# Patient Record
Sex: MALE | Race: BLACK OR AFRICAN AMERICAN | URBAN - METROPOLITAN AREA
[De-identification: names, ages, dates, MRNs, and addresses within clinical notes are randomized per-mention and may not be internally consistent; named-entity substitution may affect disease eponyms.]

---

## 2023-11-20 ENCOUNTER — TRANSCRIBE ORDERS (OUTPATIENT)
Facility: HOSPITAL | Age: 73
End: 2023-11-20

## 2023-11-20 DIAGNOSIS — I70.0 ATHEROSCLEROSIS OF AORTA (HCC): Primary | ICD-10-CM

## 2023-11-21 ENCOUNTER — TRANSCRIBE ORDERS (OUTPATIENT)
Facility: HOSPITAL | Age: 73
End: 2023-11-21

## 2023-11-21 DIAGNOSIS — Z87.891 ENCOUNTER FOR SCREENING FOR ABDOMINAL AORTIC ANEURYSM (AAA) IN PATIENT 50 YEARS OF AGE OR OLDER WITH HISTORY OF SMOKING: Primary | ICD-10-CM

## 2023-11-21 DIAGNOSIS — Z13.6 ENCOUNTER FOR SCREENING FOR ABDOMINAL AORTIC ANEURYSM (AAA) IN PATIENT 50 YEARS OF AGE OR OLDER WITH HISTORY OF SMOKING: Primary | ICD-10-CM

## 2023-12-13 ENCOUNTER — TRANSCRIBE ORDERS (OUTPATIENT)
Facility: HOSPITAL | Age: 73
End: 2023-12-13

## 2023-12-13 DIAGNOSIS — F17.211 CIGARETTE NICOTINE DEPENDENCE IN REMISSION: ICD-10-CM

## 2023-12-13 DIAGNOSIS — Z13.6 ENCOUNTER FOR SPECIAL SCREENING EXAMINATION FOR CARDIOVASCULAR DISORDER: Primary | ICD-10-CM

## 2024-01-11 ENCOUNTER — APPOINTMENT (OUTPATIENT)
Facility: HOSPITAL | Age: 74
DRG: 024 | End: 2024-01-11
Payer: MEDICARE

## 2024-01-11 ENCOUNTER — HOSPITAL ENCOUNTER (INPATIENT)
Facility: HOSPITAL | Age: 74
LOS: 12 days | Discharge: NURSING, REHAB OR LONG-TERM CARE | DRG: 024 | End: 2024-01-23
Attending: EMERGENCY MEDICINE | Admitting: ANESTHESIOLOGY
Payer: MEDICARE

## 2024-01-11 ENCOUNTER — ANESTHESIA EVENT (OUTPATIENT)
Facility: HOSPITAL | Age: 74
End: 2024-01-11
Payer: MEDICARE

## 2024-01-11 ENCOUNTER — ANESTHESIA (OUTPATIENT)
Facility: HOSPITAL | Age: 74
End: 2024-01-11
Payer: MEDICARE

## 2024-01-11 ENCOUNTER — HOSPITAL ENCOUNTER (EMERGENCY)
Facility: HOSPITAL | Age: 74
Discharge: HOME | DRG: 024 | End: 2024-01-14
Payer: MEDICARE

## 2024-01-11 VITALS
OXYGEN SATURATION: 100 % | DIASTOLIC BLOOD PRESSURE: 114 MMHG | HEART RATE: 80 BPM | SYSTOLIC BLOOD PRESSURE: 150 MMHG | RESPIRATION RATE: 18 BRPM

## 2024-01-11 DIAGNOSIS — I63.9 ACUTE CEREBROVASCULAR ACCIDENT (CVA) (HCC): Primary | ICD-10-CM

## 2024-01-11 DIAGNOSIS — I63.9 CEREBROVASCULAR ACCIDENT (CVA), UNSPECIFIED MECHANISM (HCC): ICD-10-CM

## 2024-01-11 LAB
ALBUMIN SERPL-MCNC: 3 G/DL (ref 3.5–5)
ALBUMIN/GLOB SERPL: 0.7 (ref 1.1–2.2)
ALP SERPL-CCNC: 56 U/L (ref 45–117)
ALT SERPL-CCNC: 33 U/L (ref 12–78)
ANION GAP SERPL CALC-SCNC: 6 MMOL/L (ref 5–15)
AST SERPL-CCNC: 20 U/L (ref 15–37)
BASOPHILS # BLD: 0 K/UL (ref 0–0.1)
BASOPHILS NFR BLD: 0 % (ref 0–1)
BILIRUB SERPL-MCNC: 0.6 MG/DL (ref 0.2–1)
BUN SERPL-MCNC: 18 MG/DL (ref 6–20)
BUN/CREAT SERPL: 11 (ref 12–20)
CALCIUM SERPL-MCNC: 8.8 MG/DL (ref 8.5–10.1)
CHLORIDE SERPL-SCNC: 109 MMOL/L (ref 97–108)
CO2 SERPL-SCNC: 23 MMOL/L (ref 21–32)
COMMENT:: NORMAL
CREAT SERPL-MCNC: 1.62 MG/DL (ref 0.7–1.3)
DIFFERENTIAL METHOD BLD: NORMAL
EOSINOPHIL # BLD: 0.1 K/UL (ref 0–0.4)
EOSINOPHIL NFR BLD: 2 % (ref 0–7)
ERYTHROCYTE [DISTWIDTH] IN BLOOD BY AUTOMATED COUNT: 12.3 % (ref 11.5–14.5)
GLOBULIN SER CALC-MCNC: 4.2 G/DL (ref 2–4)
GLUCOSE BLD STRIP.AUTO-MCNC: 97 MG/DL (ref 65–117)
GLUCOSE SERPL-MCNC: 99 MG/DL (ref 65–100)
HCT VFR BLD AUTO: 44.6 % (ref 36.6–50.3)
HGB BLD-MCNC: 14.5 G/DL (ref 12.1–17)
IMM GRANULOCYTES # BLD AUTO: 0 K/UL (ref 0–0.04)
IMM GRANULOCYTES NFR BLD AUTO: 0 % (ref 0–0.5)
INR PPP: 1 (ref 0.9–1.1)
LYMPHOCYTES # BLD: 2 K/UL (ref 0.8–3.5)
LYMPHOCYTES NFR BLD: 35 % (ref 12–49)
MCH RBC QN AUTO: 30.1 PG (ref 26–34)
MCHC RBC AUTO-ENTMCNC: 32.5 G/DL (ref 30–36.5)
MCV RBC AUTO: 92.5 FL (ref 80–99)
MONOCYTES # BLD: 0.6 K/UL (ref 0–1)
MONOCYTES NFR BLD: 11 % (ref 5–13)
NEUTS SEG # BLD: 2.9 K/UL (ref 1.8–8)
NEUTS SEG NFR BLD: 52 % (ref 32–75)
NRBC # BLD: 0 K/UL (ref 0–0.01)
NRBC BLD-RTO: 0 PER 100 WBC
PLATELET # BLD AUTO: 189 K/UL (ref 150–400)
PMV BLD AUTO: 10.7 FL (ref 8.9–12.9)
POTASSIUM SERPL-SCNC: 4 MMOL/L (ref 3.5–5.1)
PROT SERPL-MCNC: 7.2 G/DL (ref 6.4–8.2)
PROTHROMBIN TIME: 10.5 SEC (ref 9–11.1)
RBC # BLD AUTO: 4.82 M/UL (ref 4.1–5.7)
SERVICE CMNT-IMP: NORMAL
SODIUM SERPL-SCNC: 138 MMOL/L (ref 136–145)
SPECIMEN HOLD: NORMAL
TROPONIN I SERPL HS-MCNC: 6 NG/L (ref 0–76)
WBC # BLD AUTO: 5.8 K/UL (ref 4.1–11.1)

## 2024-01-11 PROCEDURE — 82962 GLUCOSE BLOOD TEST: CPT

## 2024-01-11 PROCEDURE — C1769 GUIDE WIRE: HCPCS

## 2024-01-11 PROCEDURE — 37195 THROMBOLYTIC THERAPY STROKE: CPT

## 2024-01-11 PROCEDURE — 99285 EMERGENCY DEPT VISIT HI MDM: CPT

## 2024-01-11 PROCEDURE — 4A03X5D MEASUREMENT OF ARTERIAL FLOW, INTRACRANIAL, EXTERNAL APPROACH: ICD-10-PCS | Performed by: EMERGENCY MEDICINE

## 2024-01-11 PROCEDURE — 0042T CT BRAIN PERFUSION: CPT

## 2024-01-11 PROCEDURE — 2580000003 HC RX 258: Performed by: NURSE PRACTITIONER

## 2024-01-11 PROCEDURE — 3E03317 INTRODUCTION OF OTHER THROMBOLYTIC INTO PERIPHERAL VEIN, PERCUTANEOUS APPROACH: ICD-10-PCS | Performed by: EMERGENCY MEDICINE

## 2024-01-11 PROCEDURE — 70498 CT ANGIOGRAPHY NECK: CPT

## 2024-01-11 PROCEDURE — 85610 PROTHROMBIN TIME: CPT

## 2024-01-11 PROCEDURE — 2580000003 HC RX 258

## 2024-01-11 PROCEDURE — 61645 PERQ ART M-THROMBECT &/NFS: CPT | Performed by: RADIOLOGY

## 2024-01-11 PROCEDURE — 36415 COLL VENOUS BLD VENIPUNCTURE: CPT

## 2024-01-11 PROCEDURE — 2580000003 HC RX 258: Performed by: EMERGENCY MEDICINE

## 2024-01-11 PROCEDURE — 85025 COMPLETE CBC W/AUTO DIFF WBC: CPT

## 2024-01-11 PROCEDURE — 96365 THER/PROPH/DIAG IV INF INIT: CPT

## 2024-01-11 PROCEDURE — 84484 ASSAY OF TROPONIN QUANT: CPT

## 2024-01-11 PROCEDURE — 70450 CT HEAD/BRAIN W/O DYE: CPT

## 2024-01-11 PROCEDURE — 6360000004 HC RX CONTRAST MEDICATION: Performed by: EMERGENCY MEDICINE

## 2024-01-11 PROCEDURE — 6360000002 HC RX W HCPCS

## 2024-01-11 PROCEDURE — 03CG3ZZ EXTIRPATION OF MATTER FROM INTRACRANIAL ARTERY, PERCUTANEOUS APPROACH: ICD-10-PCS | Performed by: RADIOLOGY

## 2024-01-11 PROCEDURE — 3700000000 HC ANESTHESIA ATTENDED CARE

## 2024-01-11 PROCEDURE — 2500000003 HC RX 250 WO HCPCS

## 2024-01-11 PROCEDURE — 3700000001 HC ADD 15 MINUTES (ANESTHESIA)

## 2024-01-11 PROCEDURE — 80053 COMPREHEN METABOLIC PANEL: CPT

## 2024-01-11 PROCEDURE — 6360000002 HC RX W HCPCS: Performed by: EMERGENCY MEDICINE

## 2024-01-11 PROCEDURE — APPNB45 APP NON BILLABLE 31-45 MINUTES

## 2024-01-11 PROCEDURE — 2000000000 HC ICU R&B

## 2024-01-11 RX ORDER — LABETALOL HYDROCHLORIDE 5 MG/ML
10 INJECTION, SOLUTION INTRAVENOUS ONCE
Status: DISCONTINUED | OUTPATIENT
Start: 2024-01-11 | End: 2024-01-13

## 2024-01-11 RX ORDER — SENNOSIDES 8.8 MG/5ML
5 LIQUID ORAL 2 TIMES DAILY PRN
Status: DISCONTINUED | OUTPATIENT
Start: 2024-01-11 | End: 2024-01-23 | Stop reason: HOSPADM

## 2024-01-11 RX ORDER — SODIUM CHLORIDE, SODIUM LACTATE, POTASSIUM CHLORIDE, CALCIUM CHLORIDE 600; 310; 30; 20 MG/100ML; MG/100ML; MG/100ML; MG/100ML
INJECTION, SOLUTION INTRAVENOUS CONTINUOUS
Status: DISCONTINUED | OUTPATIENT
Start: 2024-01-11 | End: 2024-01-12

## 2024-01-11 RX ORDER — SODIUM CHLORIDE 0.9 % (FLUSH) 0.9 %
5-40 SYRINGE (ML) INJECTION EVERY 12 HOURS SCHEDULED
Status: DISCONTINUED | OUTPATIENT
Start: 2024-01-11 | End: 2024-01-14

## 2024-01-11 RX ORDER — ONDANSETRON 2 MG/ML
4 INJECTION INTRAMUSCULAR; INTRAVENOUS EVERY 6 HOURS PRN
Status: DISCONTINUED | OUTPATIENT
Start: 2024-01-11 | End: 2024-01-23 | Stop reason: HOSPADM

## 2024-01-11 RX ORDER — DEXAMETHASONE SODIUM PHOSPHATE 4 MG/ML
INJECTION, SOLUTION INTRA-ARTICULAR; INTRALESIONAL; INTRAMUSCULAR; INTRAVENOUS; SOFT TISSUE PRN
Status: DISCONTINUED | OUTPATIENT
Start: 2024-01-11 | End: 2024-01-11 | Stop reason: SDUPTHER

## 2024-01-11 RX ORDER — ACETAMINOPHEN 650 MG/1
650 SUPPOSITORY RECTAL EVERY 6 HOURS PRN
Status: DISCONTINUED | OUTPATIENT
Start: 2024-01-11 | End: 2024-01-23 | Stop reason: HOSPADM

## 2024-01-11 RX ORDER — SODIUM CHLORIDE 0.9 % (FLUSH) 0.9 %
5-40 SYRINGE (ML) INJECTION PRN
Status: DISCONTINUED | OUTPATIENT
Start: 2024-01-11 | End: 2024-01-23 | Stop reason: HOSPADM

## 2024-01-11 RX ORDER — SUCCINYLCHOLINE CHLORIDE 20 MG/ML
INJECTION INTRAMUSCULAR; INTRAVENOUS PRN
Status: DISCONTINUED | OUTPATIENT
Start: 2024-01-11 | End: 2024-01-11 | Stop reason: SDUPTHER

## 2024-01-11 RX ORDER — ROCURONIUM BROMIDE 10 MG/ML
INJECTION, SOLUTION INTRAVENOUS PRN
Status: DISCONTINUED | OUTPATIENT
Start: 2024-01-11 | End: 2024-01-11 | Stop reason: SDUPTHER

## 2024-01-11 RX ORDER — ONDANSETRON 4 MG/1
4 TABLET, ORALLY DISINTEGRATING ORAL EVERY 8 HOURS PRN
Status: DISCONTINUED | OUTPATIENT
Start: 2024-01-11 | End: 2024-01-23 | Stop reason: HOSPADM

## 2024-01-11 RX ORDER — FENTANYL CITRATE 50 UG/ML
INJECTION, SOLUTION INTRAMUSCULAR; INTRAVENOUS PRN
Status: DISCONTINUED | OUTPATIENT
Start: 2024-01-11 | End: 2024-01-11 | Stop reason: SDUPTHER

## 2024-01-11 RX ORDER — ALBUTEROL SULFATE 2.5 MG/3ML
2.5 SOLUTION RESPIRATORY (INHALATION) EVERY 6 HOURS PRN
Status: DISCONTINUED | OUTPATIENT
Start: 2024-01-11 | End: 2024-01-23 | Stop reason: HOSPADM

## 2024-01-11 RX ORDER — SODIUM CHLORIDE 0.9 % (FLUSH) 0.9 %
10 SYRINGE (ML) INJECTION ONCE
Status: DISCONTINUED | OUTPATIENT
Start: 2024-01-11 | End: 2024-01-14

## 2024-01-11 RX ORDER — ONDANSETRON 2 MG/ML
INJECTION INTRAMUSCULAR; INTRAVENOUS PRN
Status: DISCONTINUED | OUTPATIENT
Start: 2024-01-11 | End: 2024-01-11 | Stop reason: SDUPTHER

## 2024-01-11 RX ORDER — SODIUM CHLORIDE 9 MG/ML
INJECTION, SOLUTION INTRAVENOUS CONTINUOUS PRN
Status: DISCONTINUED | OUTPATIENT
Start: 2024-01-11 | End: 2024-01-11 | Stop reason: SDUPTHER

## 2024-01-11 RX ORDER — SODIUM CHLORIDE 9 MG/ML
INJECTION, SOLUTION INTRAVENOUS PRN
Status: DISCONTINUED | OUTPATIENT
Start: 2024-01-11 | End: 2024-01-23 | Stop reason: HOSPADM

## 2024-01-11 RX ORDER — SODIUM CHLORIDE, SODIUM LACTATE, POTASSIUM CHLORIDE, CALCIUM CHLORIDE 600; 310; 30; 20 MG/100ML; MG/100ML; MG/100ML; MG/100ML
INJECTION, SOLUTION INTRAVENOUS CONTINUOUS
Status: DISCONTINUED | OUTPATIENT
Start: 2024-01-11 | End: 2024-01-11

## 2024-01-11 RX ORDER — ACETAMINOPHEN 325 MG/1
650 TABLET ORAL EVERY 6 HOURS PRN
Status: DISCONTINUED | OUTPATIENT
Start: 2024-01-11 | End: 2024-01-23 | Stop reason: HOSPADM

## 2024-01-11 RX ORDER — PHENYLEPHRINE HCL IN 0.9% NACL 0.4MG/10ML
SYRINGE (ML) INTRAVENOUS PRN
Status: DISCONTINUED | OUTPATIENT
Start: 2024-01-11 | End: 2024-01-11 | Stop reason: SDUPTHER

## 2024-01-11 RX ORDER — MAGNESIUM HYDROXIDE/ALUMINUM HYDROXICE/SIMETHICONE 120; 1200; 1200 MG/30ML; MG/30ML; MG/30ML
30 SUSPENSION ORAL EVERY 6 HOURS PRN
Status: DISCONTINUED | OUTPATIENT
Start: 2024-01-11 | End: 2024-01-11

## 2024-01-11 RX ORDER — ACETAMINOPHEN 325 MG/1
650 TABLET ORAL EVERY 4 HOURS PRN
Status: DISCONTINUED | OUTPATIENT
Start: 2024-01-11 | End: 2024-01-11 | Stop reason: DRUGHIGH

## 2024-01-11 RX ADMIN — SODIUM CHLORIDE, POTASSIUM CHLORIDE, SODIUM LACTATE AND CALCIUM CHLORIDE: 600; 310; 30; 20 INJECTION, SOLUTION INTRAVENOUS at 22:25

## 2024-01-11 RX ADMIN — PROPOFOL 150 MG: 10 INJECTION, EMULSION INTRAVENOUS at 20:34

## 2024-01-11 RX ADMIN — SODIUM CHLORIDE: 9 INJECTION, SOLUTION INTRAVENOUS at 20:28

## 2024-01-11 RX ADMIN — Medication 20 MG: at 19:30

## 2024-01-11 RX ADMIN — DEXAMETHASONE SODIUM PHOSPHATE 4 MG: 4 INJECTION INTRA-ARTICULAR; INTRALESIONAL; INTRAMUSCULAR; INTRAVENOUS; SOFT TISSUE at 20:56

## 2024-01-11 RX ADMIN — FENTANYL CITRATE 50 MCG: 50 INJECTION, SOLUTION INTRAMUSCULAR; INTRAVENOUS at 20:34

## 2024-01-11 RX ADMIN — IOPAMIDOL 80 ML: 755 INJECTION, SOLUTION INTRAVENOUS at 19:47

## 2024-01-11 RX ADMIN — LIDOCAINE HYDROCHLORIDE 100 MG: 20 INJECTION, SOLUTION EPIDURAL; INFILTRATION; INTRACAUDAL; PERINEURAL at 20:33

## 2024-01-11 RX ADMIN — SODIUM CHLORIDE, PRESERVATIVE FREE 10 ML: 5 INJECTION INTRAVENOUS at 22:29

## 2024-01-11 RX ADMIN — LEVETIRACETAM 4854 MG: 100 INJECTION, SOLUTION INTRAVENOUS at 20:15

## 2024-01-11 RX ADMIN — ONDANSETRON 4 MG: 2 INJECTION INTRAMUSCULAR; INTRAVENOUS at 20:56

## 2024-01-11 RX ADMIN — IOPAMIDOL 40 ML: 755 INJECTION, SOLUTION INTRAVENOUS at 19:46

## 2024-01-11 RX ADMIN — SUCCINYLCHOLINE CHLORIDE 180 MG: 20 INJECTION, SOLUTION INTRAMUSCULAR; INTRAVENOUS at 20:34

## 2024-01-11 RX ADMIN — PHENYLEPHRINE HYDROCHLORIDE 50 MCG/MIN: 10 INJECTION INTRAVENOUS at 20:39

## 2024-01-11 RX ADMIN — PROPOFOL 30 MG: 10 INJECTION, EMULSION INTRAVENOUS at 21:11

## 2024-01-11 RX ADMIN — Medication 160 MCG: at 20:41

## 2024-01-11 RX ADMIN — SUGAMMADEX 200 MG: 100 INJECTION, SOLUTION INTRAVENOUS at 21:11

## 2024-01-11 RX ADMIN — ROCURONIUM BROMIDE 10 MG: 10 SOLUTION INTRAVENOUS at 20:33

## 2024-01-11 RX ADMIN — ROCURONIUM BROMIDE 30 MG: 10 SOLUTION INTRAVENOUS at 20:39

## 2024-01-11 ASSESSMENT — PAIN SCALES - WONG BAKER: WONGBAKER_NUMERICALRESPONSE: 0

## 2024-01-12 ENCOUNTER — APPOINTMENT (OUTPATIENT)
Facility: HOSPITAL | Age: 74
DRG: 024 | End: 2024-01-12
Payer: MEDICARE

## 2024-01-12 PROBLEM — I10 PRIMARY HYPERTENSION: Status: ACTIVE | Noted: 2024-01-12

## 2024-01-12 PROBLEM — I63.232: Status: ACTIVE | Noted: 2024-01-12

## 2024-01-12 PROBLEM — E78.5 HYPERLIPIDEMIA: Status: ACTIVE | Noted: 2024-01-12

## 2024-01-12 LAB
AMPHET UR QL SCN: NEGATIVE
ANION GAP SERPL CALC-SCNC: 8 MMOL/L (ref 5–15)
BARBITURATES UR QL SCN: NEGATIVE
BENZODIAZ UR QL: NEGATIVE
BUN SERPL-MCNC: 18 MG/DL (ref 6–20)
BUN/CREAT SERPL: 13 (ref 12–20)
CALCIUM SERPL-MCNC: 8.7 MG/DL (ref 8.5–10.1)
CANNABINOIDS UR QL SCN: NEGATIVE
CHLORIDE SERPL-SCNC: 111 MMOL/L (ref 97–108)
CHOLEST SERPL-MCNC: 158 MG/DL
CO2 SERPL-SCNC: 24 MMOL/L (ref 21–32)
COCAINE UR QL SCN: NEGATIVE
CREAT SERPL-MCNC: 1.35 MG/DL (ref 0.7–1.3)
ECHO AO ROOT DIAM: 3.5 CM
ECHO AO ROOT INDEX: 1.68 CM/M2
ECHO BSA: 2.09 M2
ECHO LA DIAMETER INDEX: 1.97 CM/M2
ECHO LA DIAMETER: 4.1 CM
ECHO LA TO AORTIC ROOT RATIO: 1.17
ECHO LA VOL A-L A2C: 72 ML (ref 18–58)
ECHO LA VOL A-L A4C: 56 ML (ref 18–58)
ECHO LA VOL MOD A2C: 71 ML (ref 18–58)
ECHO LA VOL MOD A4C: 51 ML (ref 18–58)
ECHO LA VOLUME AREA LENGTH: 66 ML
ECHO LA VOLUME INDEX A-L A2C: 35 ML/M2 (ref 16–34)
ECHO LA VOLUME INDEX A-L A4C: 27 ML/M2 (ref 16–34)
ECHO LA VOLUME INDEX AREA LENGTH: 32 ML/M2 (ref 16–34)
ECHO LA VOLUME INDEX MOD A2C: 34 ML/M2 (ref 16–34)
ECHO LA VOLUME INDEX MOD A4C: 25 ML/M2 (ref 16–34)
ECHO LV E' LATERAL VELOCITY: 6 CM/S
ECHO LV E' SEPTAL VELOCITY: 5 CM/S
ECHO LV FRACTIONAL SHORTENING: 37 % (ref 28–44)
ECHO LV INTERNAL DIMENSION DIASTOLE INDEX: 1.97 CM/M2
ECHO LV INTERNAL DIMENSION DIASTOLIC: 4.1 CM (ref 4.2–5.9)
ECHO LV INTERNAL DIMENSION SYSTOLIC INDEX: 1.25 CM/M2
ECHO LV INTERNAL DIMENSION SYSTOLIC: 2.6 CM
ECHO LV IVSD: 1 CM (ref 0.6–1)
ECHO LV MASS 2D: 141.5 G (ref 88–224)
ECHO LV MASS INDEX 2D: 68.1 G/M2 (ref 49–115)
ECHO LV POSTERIOR WALL DIASTOLIC: 1.1 CM (ref 0.6–1)
ECHO LV RELATIVE WALL THICKNESS RATIO: 0.54
ECHO LVOT AREA: 2.8 CM2
ECHO LVOT DIAM: 1.9 CM
ECHO LVOT PEAK GRADIENT: 4 MMHG
ECHO LVOT PEAK VELOCITY: 1 M/S
ECHO MV A VELOCITY: 0.91 M/S
ECHO MV AREA PHT: 3.5 CM2
ECHO MV E DECELERATION TIME (DT): 216.4 MS
ECHO MV E VELOCITY: 0.68 M/S
ECHO MV E/A RATIO: 0.75
ECHO MV E/E' LATERAL: 11.33
ECHO MV E/E' RATIO (AVERAGED): 12.47
ECHO MV PRESSURE HALF TIME (PHT): 62.7 MS
ECHO PV MAX VELOCITY: 0.8 M/S
ECHO PV PEAK GRADIENT: 3 MMHG
ECHO RV FREE WALL PEAK S': 18 CM/S
ECHO RV TAPSE: 1.7 CM (ref 1.7–?)
EST. AVERAGE GLUCOSE BLD GHB EST-MCNC: 114 MG/DL
GLUCOSE SERPL-MCNC: 126 MG/DL (ref 65–100)
HBA1C MFR BLD: 5.6 % (ref 4–5.6)
HDLC SERPL-MCNC: 38 MG/DL
HDLC SERPL: 4.2 (ref 0–5)
LACTATE SERPL-SCNC: 1.7 MMOL/L (ref 0.4–2)
LDLC SERPL CALC-MCNC: 110.6 MG/DL (ref 0–100)
Lab: NORMAL
MAGNESIUM SERPL-MCNC: 2.1 MG/DL (ref 1.6–2.4)
METHADONE UR QL: NEGATIVE
OPIATES UR QL: NEGATIVE
PCP UR QL: NEGATIVE
PHOSPHATE SERPL-MCNC: 3 MG/DL (ref 2.6–4.7)
POTASSIUM SERPL-SCNC: 4.4 MMOL/L (ref 3.5–5.1)
PROCALCITONIN SERPL-MCNC: <0.05 NG/ML
SODIUM SERPL-SCNC: 143 MMOL/L (ref 136–145)
TRIGL SERPL-MCNC: 47 MG/DL
TSH SERPL DL<=0.05 MIU/L-ACNC: 0.67 UIU/ML (ref 0.36–3.74)
VLDLC SERPL CALC-MCNC: 9.4 MG/DL

## 2024-01-12 PROCEDURE — 84145 PROCALCITONIN (PCT): CPT

## 2024-01-12 PROCEDURE — 2580000003 HC RX 258: Performed by: EMERGENCY MEDICINE

## 2024-01-12 PROCEDURE — 94640 AIRWAY INHALATION TREATMENT: CPT

## 2024-01-12 PROCEDURE — 84100 ASSAY OF PHOSPHORUS: CPT

## 2024-01-12 PROCEDURE — 93005 ELECTROCARDIOGRAM TRACING: CPT | Performed by: NURSE PRACTITIONER

## 2024-01-12 PROCEDURE — 2580000003 HC RX 258

## 2024-01-12 PROCEDURE — 2000000000 HC ICU R&B

## 2024-01-12 PROCEDURE — 93306 TTE W/DOPPLER COMPLETE: CPT

## 2024-01-12 PROCEDURE — 84443 ASSAY THYROID STIM HORMONE: CPT

## 2024-01-12 PROCEDURE — 80061 LIPID PANEL: CPT

## 2024-01-12 PROCEDURE — 83036 HEMOGLOBIN GLYCOSYLATED A1C: CPT

## 2024-01-12 PROCEDURE — 83735 ASSAY OF MAGNESIUM: CPT

## 2024-01-12 PROCEDURE — A4216 STERILE WATER/SALINE, 10 ML: HCPCS | Performed by: NURSE PRACTITIONER

## 2024-01-12 PROCEDURE — 70551 MRI BRAIN STEM W/O DYE: CPT

## 2024-01-12 PROCEDURE — 6370000000 HC RX 637 (ALT 250 FOR IP): Performed by: INTERNAL MEDICINE

## 2024-01-12 PROCEDURE — 87040 BLOOD CULTURE FOR BACTERIA: CPT

## 2024-01-12 PROCEDURE — 99233 SBSQ HOSP IP/OBS HIGH 50: CPT | Performed by: NURSE PRACTITIONER

## 2024-01-12 PROCEDURE — 80307 DRUG TEST PRSMV CHEM ANLYZR: CPT

## 2024-01-12 PROCEDURE — 92610 EVALUATE SWALLOWING FUNCTION: CPT

## 2024-01-12 PROCEDURE — 80048 BASIC METABOLIC PNL TOTAL CA: CPT

## 2024-01-12 PROCEDURE — 51798 US URINE CAPACITY MEASURE: CPT

## 2024-01-12 PROCEDURE — 36415 COLL VENOUS BLD VENIPUNCTURE: CPT

## 2024-01-12 PROCEDURE — 99223 1ST HOSP IP/OBS HIGH 75: CPT | Performed by: PSYCHIATRY & NEUROLOGY

## 2024-01-12 PROCEDURE — 93306 TTE W/DOPPLER COMPLETE: CPT | Performed by: SPECIALIST

## 2024-01-12 PROCEDURE — 83605 ASSAY OF LACTIC ACID: CPT

## 2024-01-12 PROCEDURE — 6360000002 HC RX W HCPCS: Performed by: INTERNAL MEDICINE

## 2024-01-12 PROCEDURE — 2580000003 HC RX 258: Performed by: NURSE PRACTITIONER

## 2024-01-12 PROCEDURE — 2500000003 HC RX 250 WO HCPCS: Performed by: NURSE PRACTITIONER

## 2024-01-12 PROCEDURE — 6370000000 HC RX 637 (ALT 250 FOR IP): Performed by: NURSE PRACTITIONER

## 2024-01-12 PROCEDURE — APPNB60 APP NON BILLABLE TIME 46-60 MINS

## 2024-01-12 RX ORDER — TAMSULOSIN HYDROCHLORIDE 0.4 MG/1
0.4 CAPSULE ORAL
Status: DISCONTINUED | OUTPATIENT
Start: 2024-01-12 | End: 2024-01-23 | Stop reason: HOSPADM

## 2024-01-12 RX ORDER — ALBUTEROL SULFATE 2.5 MG/3ML
2.5 SOLUTION RESPIRATORY (INHALATION) EVERY 6 HOURS PRN
Status: DISCONTINUED | OUTPATIENT
Start: 2024-01-12 | End: 2024-01-23 | Stop reason: HOSPADM

## 2024-01-12 RX ORDER — CLINDAMYCIN PHOSPHATE 600 MG/50ML
600 INJECTION, SOLUTION INTRAVENOUS EVERY 8 HOURS
Status: DISCONTINUED | OUTPATIENT
Start: 2024-01-12 | End: 2024-01-12 | Stop reason: CLARIF

## 2024-01-12 RX ORDER — ATORVASTATIN CALCIUM 40 MG/1
40 TABLET, FILM COATED ORAL NIGHTLY
Status: DISCONTINUED | OUTPATIENT
Start: 2024-01-12 | End: 2024-01-23 | Stop reason: HOSPADM

## 2024-01-12 RX ORDER — FINASTERIDE 5 MG/1
5 TABLET, FILM COATED ORAL DAILY
Status: DISCONTINUED | OUTPATIENT
Start: 2024-01-12 | End: 2024-01-23 | Stop reason: HOSPADM

## 2024-01-12 RX ORDER — PETROLATUM,WHITE/LANOLIN
OINTMENT (GRAM) TOPICAL 4 TIMES DAILY PRN
Status: DISCONTINUED | OUTPATIENT
Start: 2024-01-12 | End: 2024-01-23 | Stop reason: HOSPADM

## 2024-01-12 RX ORDER — ASPIRIN 81 MG/1
81 TABLET, CHEWABLE ORAL DAILY
Status: DISCONTINUED | OUTPATIENT
Start: 2024-01-13 | End: 2024-01-23 | Stop reason: HOSPADM

## 2024-01-12 RX ADMIN — ARFORMOTEROL TARTRATE: 15 SOLUTION RESPIRATORY (INHALATION) at 12:12

## 2024-01-12 RX ADMIN — SODIUM CHLORIDE, POTASSIUM CHLORIDE, SODIUM LACTATE AND CALCIUM CHLORIDE: 600; 310; 30; 20 INJECTION, SOLUTION INTRAVENOUS at 08:42

## 2024-01-12 RX ADMIN — SODIUM CHLORIDE, PRESERVATIVE FREE 10 ML: 5 INJECTION INTRAVENOUS at 20:52

## 2024-01-12 RX ADMIN — ATORVASTATIN CALCIUM 40 MG: 40 TABLET, FILM COATED ORAL at 20:58

## 2024-01-12 RX ADMIN — SODIUM CHLORIDE, PRESERVATIVE FREE 10 ML: 5 INJECTION INTRAVENOUS at 08:43

## 2024-01-12 RX ADMIN — FINASTERIDE 5 MG: 5 TABLET, FILM COATED ORAL at 13:08

## 2024-01-12 RX ADMIN — FAMOTIDINE 20 MG: 10 INJECTION, SOLUTION INTRAVENOUS at 08:42

## 2024-01-12 RX ADMIN — SODIUM CHLORIDE, POTASSIUM CHLORIDE, SODIUM LACTATE AND CALCIUM CHLORIDE: 600; 310; 30; 20 INJECTION, SOLUTION INTRAVENOUS at 20:50

## 2024-01-12 RX ADMIN — SODIUM CHLORIDE, PRESERVATIVE FREE 10 ML: 5 INJECTION INTRAVENOUS at 20:51

## 2024-01-12 ASSESSMENT — PAIN SCALES - WONG BAKER
WONGBAKER_NUMERICALRESPONSE: 0

## 2024-01-12 NOTE — CONSULTS
NeuroInterventional Surgery Consult  HUSSAIN Barraza NP    Patient: Cale Mohan MRN: [id number]  SSN: [id number]    YOB: 1950  Age: 73 y.o.  Sex: male      Chief Complaints: Right arm weakness, inability to speak.    HPI:   73 y.o. R-H Black /   male w/ limited pmh including angina and HTN.  He presented to [facility] ED on 1/11/24 via EMS for right-side weakness and inability to speak.  History provided by live-in parterMagalis, who reports that the pt attempted to get up to the bathroom between 5019-2166 when she noticed that his right arm was shaking and he couldn't speak; LKWT 1800.  Pt arrived as level 1 code stroke, initial NIHSS 19. Initial CTH showed no acute process.  After exam by teleneurology pt was administered TNK. CTP showed evidence of perfusion mismatch in the left occipital lobe w/ concern for possible PCA occlusion.  CTAh-n showed occluded distal ICA w/ fetal-type left PCA.  Pt's partner and decision maker, Magalis, as well as her son were notified and consented to angiogram w/ plan for thrombectomy. Pt's baseline mRS 0.  He was taken for cerebral angiogram w/ mechanical thrombectomy by Dr. Aly. NIS has been consulted for evaluation and care in the setting of acute ischemic stroke.      No past medical history on file.  No family history on file.  Social History     Tobacco Use    Smoking status: Not on file    Smokeless tobacco: Not on file   Substance Use Topics    Alcohol use: Not on file      Current Facility-Administered Medications   Medication Dose Route Frequency Provider Last Rate Last Admin    sodium chloride flush 0.9 % injection 5-40 mL  5-40 mL IntraVENous 2 times per day Chiquita Alatorre MD        sodium chloride flush 0.9 % injection 5-40 mL  5-40 mL IntraVENous PRN Chiquita Alatorre MD        0.9 % sodium chloride infusion   IntraVENous PRN Chiquita Alatorre MD        sodium chloride flush 0.9 % injection 10 mL  10 mL IntraVENous Once Chiquita Alatorre

## 2024-01-12 NOTE — H&P
Name: Cale Mohan   : 1950   MRN: [id number]   Date: 2024      REASON FOR ICU ADMISSION:  CVA post TNK and Mechanical Thrombectomy     PRINCIPLE ICU DIAGNOSIS   Acute CVA: Left ICA occlusion  post TNK and Mechanical Thrombectomy   Age indeterminate occlusion of cervical left vertebral artery, with  reconstitution distally.  Focal stenosis of the basilar artery.  Nonspecific tiny gas pockets in the right face/upper neck/right parapharyngeal soft tissues seen on CTA neck.  Hx HTN    PATIENT SUMMARY   Cale Mohan is a 73 y.o. male with a history of HTN who presented to ED with complains of right sided weakness and aphasia. LKW at 1800. Patient attempted to get up to the bathroom sometime between 8972-9300 and she noticed that the patient's right arm was shaking and that he could not speak . EMS called and Code S called. Initial NIHSS 19. CT done, no hemorrhage and L ICA occlusion noted on CTA head and neck. Also non specific gas pockets in the right upper neck soft tissues noted. Tele neuro evaluated and deemed patient a candidate for TNK. Patient not on any anticoagulation. TNK was given at 1930. NIS was also notified. Patient was taken to angio for emergent mechanical thrombectomy. Intubated for procedure and then extubated and transferred to ICU post procedure for continued management.      COMPREHENSIVE ASSESSMENT & PLAN:SYSTEM BASED     24 HOUR EVENTS: as above    NEUROLOGICAL:   Acute CVA: Left MCA M2 inferior division occlusion, s/p TNK and combined thrombectomy resulting in complete reperfusion of left MCA territory.  Analgesia: None  Sedation: None  Neuro check Frequency: post procedure protocol and then hourly    Loaded with Keppra in ED.   No ASA/anticoagulants until 24 hours post TNK and imaging is negative for hemorrhage  MRI per neurology  A1C and lipid panel pending, LDL goal <70  PT/OT/SLP evals    PULMONOLOGY:   Respiratory Goals: Maintain on RA at this time    CARDIOVASCULAR:    HTN  SBP Goal of: 100-160 mmHg  MAP Goal of: greater than 65 mmHg  None - For above SBP/MAP goals  IVF: LR @ 75 ml/hr for 24 hrs - then re-evaluate need to continue   Echo  EKG    GASTROINTESTINAL   Diet/Feeding: Swallow evaluation and advance as tolerated to regular diet.   SUP   PRN Zofran for N/V    RENAL/ELECTROLYTE/FLUIDS:   Keep K>4; Mg>2    Trend renal indices/ UOP  Trend Chemistry  Send UDS    ENDOCRINE:   Glycemic Control: Goal 120-180: SSI PRN  Prevent hypoglycemia  TSH    HEMATOLOGY/ONCOLOGY:   Transfusion Trigger (Hgb): 7.0  Trend CBC    INFECTIOUS DISEASE:   Empiric abx for possible soft tissue infection seen on CTA neck   Send procalcitonin and lactic acid  ANTIBIOTICS TO DATE:   01/11/24: Start on Clindamycin once blood cx are completed.     CULTURES TO DATE:   01/11/24: Blood cx    ICU DAILY CHECKLIST   Code Status: Full code  DVT Prophylaxis:SCDs  T/L/D: Tubes: None  Lines: PIVs  Drains: None  SUP: Pepcid  Diet: NPO - advance as tolerated to regular  Activity Level: Bedrest then PT/OT  ABCDEF Bundle/Checklist Completed: Yes  Disposition: Stay in ICU   Multidisciplinary Rounds Completed: No  Patient/Family Updated: Yes at bedside  Goals of Care Discussion/Palliative: NA    Feeding Tube:                        Sheath:                          Infusion Wire/Catheter:      Peripherally Inserted Central Catheter:     Central Venous Catheter:     Venous Access Device:     Peripheral Intravenous Line:    Peripheral IV 01/11/24 Distal;Left Cephalic (Active)     Arterial Line:     Hemodialysis Catheter:     Drain(s):     Airway:     Intraosseous Line:     Epidural:     Atrial Line:     Cervical Ripening Balloon:       HOSPITAL COURSE/DAILY EVENT LOG   01/11/24 : Admit to ICU post TNK and thrombectomy.     SUBJECTIVE   ROS:  A comprehensive review of systems was negative except for: Neurological: positive for speech problems and weakness    OBJECTIVE   Labs and Data: Reviewed 01/11/24  Medications: Reviewed

## 2024-01-12 NOTE — CARE COORDINATION
Care Management Initial Assessment       RUR: 8% Low   Readmission? No  1st IM letter given? Yes - 1/12/24 01/12/24 1201   Service Assessment   Patient Orientation Unable to Assess   Cognition Dementia / Early Alzheimer's   History Provided By Significant Other  (Magalis Cotter [phone])   Primary Caregiver Other (Comment)  (Significant other Magalis Mcclellan)   Support Systems Spouse/Significant Other   Patient's Healthcare Decision Maker is: Legal Next of Kin  (Magalis [place])   PCP Verified by CM Yes   Last Visit to PCP Within last 3 months   Prior Functional Level Independent in ADLs/IADLs   Current Functional Level Assistance with the following:;Bathing;Dressing;Toileting;Mobility   Can patient return to prior living arrangement Unknown at present   Ability to make needs known: Poor  (Patient has early dementia)   Family able to assist with home care needs: Yes   Would you like for me to discuss the discharge plan with any other family members/significant others, and if so, who? Yes   Financial Resources Medicare  ([facility] Medicare)   Community Resources None   Social/Functional History   Lives With Significant other  (Magalis Cotter)   Type of Home House   Home Layout One level   Home Access Stairs to enter with rails   Entrance Stairs - Number of Steps 1   Entrance Stairs - Rails None   Bathroom Shower/Tub Tub/Shower unit   Bathroom Equipment None   Home Equipment Rollator   Receives Help From Family   ADL Assistance Independent   Homemaking Assistance Independent   Ambulation Assistance Independent   Transfer Assistance Independent   Active  No   Patient's  Info Magalis Cotter   Mode of Transportation Car   Discharge Planning   Living Arrangements Spouse/Significant Other   Current Services Prior To Admission None   Potential Assistance Purchasing Medications No   One/Two Story Residence One story   History of falls? 0     Patient presented to the ED with aphasia, right arm contracted and a left gaze.

## 2024-01-12 NOTE — PROGRESS NOTES
[facility]     ICU TEAM Progress Note           Name: Cale Mohan   : 1950   MRN: [id number]   Date: 2024          ICU PROBLEM LIST   Acute CVA: Left ICA occlusion  post TNK and Mechanical Thrombectomy   Age indeterminate occlusion of cervical left vertebral artery, with  reconstitution distally.  Focal stenosis of the basilar artery.  Nonspecific tiny gas pockets in the right face/upper neck/right parapharyngeal soft tissues seen on CTA neck.  Hx HTN     HISTORY OF PRESENT ILLNESS:     Cale Mohan is a 73 y.o. male with a history of HTN who presented to ED with complains of right sided weakness and aphasia. LKW at 1800. Patient attempted to get up to the bathroom sometime between 2093-6106 and she noticed that the patient's right arm was shaking and that he could not speak . EMS called and Code S called. Initial NIHSS 19. CT done, no hemorrhage and L ICA occlusion noted on CTA head and neck. Also non specific gas pockets in the right upper neck soft tissues noted. Tele neuro evaluated and deemed patient a candidate for TNK. Patient not on any anticoagulation. TNK was given at 1930. NIS was also notified. Patient was taken to angio for emergent mechanical thrombectomy. Intubated for procedure and then extubated and transferred to ICU post procedure for continued management.      SUBJECTIVE:     No acute events overnight     NEUROLOGICAL:   Acute CVA: Left MCA M2 inferior division occlusion, s/p TNK and combined thrombectomy resulting in complete reperfusion of left MCA territory.  Analgesia: None  Sedation: None  Neuro check Frequency: post procedure protocol and then hourly     DC Keppra  No ASA/anticoagulants until 24 hours post TNK and imaging is negative for hemorrhage  MRI per neurology  A1C and lipid panel pending, LDL goal <70  PT/OT/SLP evals    Acute metabolic encephalopathy  Delirium, reported history of early Alzheimer's dementia  Monitor for symptoms.        PULMONOLOGY:   Respiratory Goals: Maintain on RA at this time     CARDIOVASCULAR:   HTN  SBP Goal of: 100-160 mmHg  MAP Goal of: greater than 65 mmHg  None - For above SBP/MAP goals  IVF: LR @ 75 ml/hr for 24 hrs - then re-evaluate need to continue   Echo  EKG     GASTROINTESTINAL   Diet/Feeding: Swallow evaluation and advance as tolerated to regular diet.   SUP   PRN Zofran for N/V     RENAL/ELECTROLYTE/FLUIDS:   Keep K>4; Mg>2           Trend renal indices/ UOP  Trend Chemistry  Send UDS     ENDOCRINE:   Glycemic Control: Goal 120-180: SSI PRN  Prevent hypoglycemia  TSH     HEMATOLOGY/ONCOLOGY:   Transfusion Trigger (Hgb): 7.0  Trend CBC     INFECTIOUS DISEASE:   Small gas seen on CTA neck, low suspicion for infection  Normal Pro-Kenji, no fever no leukocytosis, suspect related to poor dentition  Consider Panorex, or follow-up subsequent CT scan  --Discontinue antibiotics, if concern for infection consider Unasyn/Zosyn      ICU DAILY CHECKLIST   Code Status: Full code  DVT Prophylaxis:SCDs  T/L/D: Tubes: None  Lines: PIVs  Drains: None  SUP: Pepcid  Diet: NPO - advance as tolerated to regular  Activity Level: Bedrest then PT/OT  ABCDEF Bundle/Checklist Completed: Yes  Disposition: Stay in ICU   Multidisciplinary Rounds Completed: No  Patient/Family Updated: Yes at bedside  Goals of Care Discussion/Palliative: NA    POD:  * No surgery found *    S/P:       Active Problem List:     [unfilled]    Past Medical History:      has no past medical history on file.    Past Surgical History:      has no past surgical history on file.    Home Medications:     Prior to Admission medications    Not on File       Allergies/Social/Family History:     No Known Allergies   Social History     Tobacco Use    Smoking status: Not on file    Smokeless tobacco: Not on file   Substance Use Topics    Alcohol use: Not on file      No family history on file.        Objective:   Vital Signs:  /72   Pulse 71   Temp 97.8 °F (36.6 °C)

## 2024-01-12 NOTE — PROGRESS NOTES
Patient to IR suite emergently for mechanical thrombectomy, accompanied by KARAN Soler and REGIS Ortiz, unable to complete neuro assessment due to emergent status, patient alert and right side flaccid    Date/Time Last Known Well Time: 01/11/24 @ 1800    Date/Time Discovery of Stroke Symptoms: 01/11/24 @ 1830    NIHSS upon arrival: 19 from NP note    Time to IR Suite: 2028    Time of Arterial Puncture: 2039    Start of IA Infusion of tPA or  1st pass of recanalization device in   Target vessel: 2048    Time of Revascularization: 2102    Pretreatment TICI: 0    Post treatment TICI: 3    Procedure Stop Time(When sterile drape is off): 2113    Time of first set of VS, neuro cks, groin, and pulse checks: 2118    Time transfer to ICU: 2140      Time of last VS, neuro, groin, and pulse checks documentation before ICU caring for pt: 2133 2135  TRANSFER - OUT REPORT:    Verbal report given to REGIS Luis on Cale Mohan  being transferred to ICU 11 for routine progression of patient care       Report consisted of patient's Situation, Background, Assessment and   Recommendations(SBAR).     Information from the following report(s) Nurse Handoff Report was reviewed with the receiving nurse.           Lines:   Peripheral IV 01/11/24 Distal;Left Cephalic (Active)        Opportunity for questions and clarification was provided.      Patient transported with:  Monitor, O2 @ 10lpm, and Registered Nurse  CRNA  Belongings with patient to ICU

## 2024-01-12 NOTE — ANESTHESIA PRE PROCEDURE
Department of Anesthesiology  Preprocedure Note       Name:  Cale Mohan   Age:  73 y.o.  :  1950                                          MRN:  [id number]         Date:  2024      Surgeon: * No surgeons listed *    Procedure: * No procedures listed *    Medications prior to admission:   Prior to Admission medications    Not on File       Current medications:    No current facility-administered medications for this encounter.     No current outpatient medications on file.     Facility-Administered Medications Ordered in Other Encounters   Medication Dose Route Frequency Provider Last Rate Last Admin   • sodium chloride flush 0.9 % injection 5-40 mL  5-40 mL IntraVENous 2 times per day Chiquita Alatorre MD       • sodium chloride flush 0.9 % injection 5-40 mL  5-40 mL IntraVENous PRN Chiquita Alatorre MD       • 0.9 % sodium chloride infusion   IntraVENous PRN Chiquita Alatorre MD       • sodium chloride flush 0.9 % injection 10 mL  10 mL IntraVENous Once Chiquita Alatorre MD        Followed by   • sodium chloride flush 0.9 % injection 10 mL  10 mL IntraVENous Once Chiquita Alatorre MD       • dextrose bolus 10% 125 mL  125 mL IntraVENous PRN Chiquita Alatorre MD       • labetalol (NORMODYNE;TRANDATE) injection 10 mg  10 mg IntraVENous Once Chiquita Alatorre MD       • levETIRAcetam (KEPPRA) 4,854 mg in sodium chloride 0.9 % 100 mL IVPB  60 mg/kg IntraVENous Once Chiquita Alatorre  mL/hr at 24 4,854 mg at 24       Allergies:  Not on File    Problem List:  There is no problem list on file for this patient.      Past Medical History:  No past medical history on file.    Past Surgical History:  No past surgical history on file.    Social History:    Social History     Tobacco Use   • Smoking status: Not on file   • Smokeless tobacco: Not on file   Substance Use Topics   • Alcohol use: Not on file                                Counseling given: Not Answered      Vital Signs (Current):

## 2024-01-12 NOTE — PROGRESS NOTES
Neurointerventional Surgery Progress Note  Neurocritical Care NP    Admit Date: 1/11/2024   LOS: 1 day      Daily Progress Note: 1/12/2024    Assessment:   Acute Ischemic CVA due to left ICA occlusion s/p TNK and left mechanical thrombectomy of left MCA M2 with complete reperfusion     Plan:   - MRI of Brain pending to assess extent of ischemia  - follow neuro checks post TNK protocol  - Hgb A1C ok at 5.6  - lipid panel shows .6, goal <70, start Lipitor 40 mg QHS  - ECHO shows EF 55-60%. No interatrial shunting seen.   - SBP goal 100-160 post thrombectomy   - PT/OT/SLP evals  - Neurology consult  - NIS signing off, please call if needed       Activity: Up with assistance   DVT ppx: SCDs  Dispo: TBD    Plan d/w Dr. Aly, Intensivist, RN, and patient's family.       HPI: Cale Mohan is a 73 y.o. R-H Black /  male with a PMH of angina and HTN.  He presented to [facility] ED on 1/11/24 via EMS for right-sided weakness and inability to speak. Initial CTH showed no acute process.  He was given TNK after evaluation with teleneuro. CTP showed evidence of perfusion mismatch in the left PCA territory.  CTA h-n showed a left intracranial ICA occlusion extending into the proximal fetal origin PCA.  Age indeterminate occlusion of cervical left vertebral artery, with  reconstitution distally. Focal stenosis of the basilar artery.  Pt's baseline mRS 0.  He was taken for cerebral angiogram w/ mechanical thrombectomy of the left MCA M2 inferior division by Dr. Aly on 1/11/2024 with complete reperfusion.     Subjective:   Patient is awake and following some commands. Family is at bedside. Denies any headache or vision changes, but patient is confused so ROS difficult to assess. Family reports patient has some mild dementia.    Current Facility-Administered Medications   Medication Dose Route Frequency Provider Last Rate Last Admin    albuterol (PROVENTIL) (2.5 MG/3ML) 0.083% nebulizer solution 2.5 mg  2.5 mg

## 2024-01-12 NOTE — ED NOTES
Patient being transported to IR with cardiac monitor. Neuro NP is accompanying as well as the primary RN

## 2024-01-12 NOTE — BRIEF OP NOTE
Brief Procedure Note      Patient: Cale Mohan MRN: [id number]     YOB: 1950  Age: 73 y.o.  Sex: male      Service: Neurointerventional Surgery    Date of Procedure: 1/11/2024    Pre-Procedure Diagnosis: Stroke    Post-Procedure Diagnosis: SAME    : Carmelo Aly MD    Assistant(s): N/A    Procedure(s):   Diagnostic cerebral angiogram  Transarterial mechanical thrombectomy, CNS: Combined thrombectomy of left MCA M2 inferior division  Placement of arteriotomy closure device    Vessels Studied:   Left CCA  Left ICA    Puncture Site: Right femoral artery    Preliminary Findings:   Left MCA M2 inferior division occlusion, s/p combined thrombectomy resulting in complete reperfusion of left MCA territory.    Plan:   Admit to ICU  -160  Q 1 hr neurochecks      Specimens: None    Implants: None    Drains: None    Anesthesia: GA    Estimated Blood Loss: 15 cc      Apparent Intraoperative Complications: None immediate    Patient Condition: Stable    Disposition: ICU      Signed:   Carmelo Aly MD    [facility] Neurointerventional Surgery  [facility]

## 2024-01-12 NOTE — PLAN OF CARE
Speech LAnguage Pathology EVALUATION    Patient: Cale Mohan (73 y.o. male)  Date: 1/12/2024  Primary Diagnosis: Acute cerebrovascular accident (CVA) ([facility]) [I63.9]       Precautions: aspiration, fall                    ASSESSMENT :    Consult received, chart reviewed, patient admitted for acute CVA- Left MCA M2 inferior division occlusion, s/p TNK and combined thrombectomy resulting in complete reperfusion of left MCA territory. Patient significant other in room and provided additional Hx; reports despite dentition, patient consumed regular solids and thin liquids at home. Also reports recent Dx Alz Dementia and noted difficulty with memory, money/med management and notes decline in communication at home.     Patient repositioned upright for safety with PO intake. Patient participated with PO trials thin liquids via cup, pureed solids, soft solids and regular textured solids. Patient with mild perceived delay in transit and trigger of swallow all trials, no overt s/sx aspiration or penetration observed. Patient with limited mastication regular textured solids, at times essentially swallow whole, however no overt s/sx aspiration or penetration observed. Mild oral residue noted R side however cleared with thin liquid wash.    Based on the objective data described below, the patient presents with mild oral phase, questionable pharyngeal phase dysphagia. Patient appears safe to advance to soft bite solids, thin liquids at this time with potential for further diet advancement. Family/Significant other present and reports noted communication and cognition deficits are baseline and secondary to recent reported Dx Alzheimer's Dementia - PMHx in chart is extremely limited therefore cannot confirm.    Patient will benefit from skilled intervention to address the above impairments.     PLAN :  Recommendations and Planned Interventions:  Diet: Soft and bite sized and thin liquids  No straws  Meds crushed with  puree    Reassess swallow function for possible diet advancement  Evaluate cognition/communication as indicated     Acute SLP Services: Yes, patient will be followed by speech-language pathology 2x/week to address goals. Patient's rehabilitation potential is considered to be Good.    Discharge Recommendations: Yes, recommend SLP treatment at next level of care     SUBJECTIVE:   Patient stated, “I want some water.”    OBJECTIVE:   No past medical history on file.No past surgical history on file.  Prior Level of Function/Home Situation:   Social/Functional History  Lives With: Significant other (Magalis Cotter)  Type of Home: House  Home Layout: One level  Home Access: Stairs to enter with rails  Entrance Stairs - Number of Steps: 1  Entrance Stairs - Rails: None  Bathroom Shower/Tub: Tub/Shower unit  Bathroom Equipment: None  Home Equipment: Rollator  Receives Help From: Family  ADL Assistance: Independent  Homemaking Assistance: Independent  Ambulation Assistance: Independent  Transfer Assistance: Independent  Active : No  Patient's  Info: Magalis Cotter  Mode of Transportation: Car    Cognitive and Communication Status:  Neurologic State: Alert  Orientation Level: Oriented to person  Cognition: Decreased attention/concentration, Decreased command following, Impaired decision making, Memory loss, and Poor safety awareness    Dysphagia:  Oral Assessment:  Oral Motor   Labial: No impairment  Dentition: Poor;Limited  Oral Hygiene: Moist;Clean  Lingual: Right deviation;Incoordinated  Velum: Unable to visualize  Mandible: No impairment  P.O. Trials:  PO Trials  Assessment Method(s): Observation  Vocal Quality: No Impairment  Consistency Presented: Regular;Soft & Bite Sized;Pureed;Thin  How Presented: SLP-fed/Presented;Self-fed/presented  Bolus Acceptance: No impairment  Bolus Formation/Control: Impaired  Type of Impairment: Mastication  Propulsion: Delayed (# of seconds)  Oral Residue: Less than 10% of

## 2024-01-12 NOTE — ED TRIAGE NOTES
Patient found slumped over by wife. Right arm contracted, left gaze, aphasic. Last known well 6pm today.

## 2024-01-12 NOTE — ED NOTES
Primary RN provided bedside report to IR, RN upon arrival to IR suite. Keppra was stopped due to verbal order received by anesthesiologist upon arrival to IR prior to procedure

## 2024-01-12 NOTE — CONSULTS
Neurology Staff Addendum:  I have personally seen and examined the patient. I have personally reviewed the chart and images. Elements of my examination included history of present illness, review of systems, review of past medical and surgical history, review of medications, and physical and neurological examination. I have personally reviewed the findings and impressions with the nurse practitioner and am in agreement with their note with changes below.    Pt is a 72yo RH male admitted 1/11/24 after found slumped over by wife, right arm shaking, left gaze preference, aphasic. LKN 1800. /102. NIHSS score 19. CTH neg. CTA H/N with left intracranial ICA occlusion extending to PCA, BA stenosis. CTP with mismatch in left PCA territory. S/p IV TNK at 1930 and left MCA thrombectomy.  TTE with EF 55-60%, LA normal, bubble study neg. EKG with NSR, prolonged QT. HgbA1C 5.6, .6.  Pt has been started on Lipitor 40mg daily. Pt is a very limited historian. Reports that he is feeling okay and denies any weakness. +HTN, No HLD, DM, Tob, CARLOS, ho CVA. Not on any medications or ASA at home. Lives in [place] with his constantino Blancas, retired from construction, no T/E/D use.      /74   Pulse 68   Temp 98.1 °F (36.7 °C) (Oral)   Resp 17   Ht 1.829 m (6')   Wt 86.2 kg (190 lb)   SpO2 95%   BMI 25.77 kg/m²     Physical Exam:  General: Well developed well nourished patient in no apparent distress.   Cardiac: Regular rate and rhythm with no murmurs.   Carotids: 2+ symmetric, no bruits  Extremities: 2+ Radial pulses, no cyanosis or edema    Neurological Exam:  Mental Status: Oriented to time, place and person. Speech - no dysarthria.  Language intact with delayed responses at times. Difficulty with complex commands. Attention and fund of knowledge appropriate. Limited historian, frequently requires stimulation to participate in exam.    Cranial Nerves:   VF- unable to assess. PERRL, EOM- left gaze preference, no  discussion/decision making and documentation.     Signed By: HUSSAIN Barraza - NP, Neurocritical Care Nurse Practitioner    January 12, 2024

## 2024-01-12 NOTE — PROGRESS NOTES
4 Eyes Skin Assessment     NAME:  Cale Mohan  YOB: 1950  MEDICAL RECORD NUMBER:  [id number]    The patient is being assessed for  Admission    I agree that at least one RN has performed a thorough Head to Toe Skin Assessment on the patient. ALL assessment sites listed below have been assessed.      Areas assessed by both nurses:    Head, Face, Ears, Shoulders, Back, Chest, Arms, Elbows, Hands, Sacrum. Buttock, Coccyx, Ischium, Legs. Feet and Heels, and Under Medical Devices         Does the Patient have a Wound? No noted wound(s)       Craig Prevention initiated by RN: Yes  Wound Care Orders initiated by RN: No    Pressure Injury (Stage 3,4, Unstageable, DTI, NWPT, and Complex wounds) if present, place Wound referral order by RN under : No    New Ostomies, if present place, Ostomy referral order under : No     Nurse 1 eSignature: Electronically signed by Janelle Bolaños RN on 1/11/24 at 10:50 PM EST    **SHARE this note so that the co-signing nurse can place an eSignature**    Nurse 2 eSignature: Electronically signed by Deepti Benedict RN on 1/11/24 at 10:58 PM EST

## 2024-01-12 NOTE — ANESTHESIA POSTPROCEDURE EVALUATION
Department of Anesthesiology  Postprocedure Note    Patient: Cale Mohan  MRN: [id number]  YOB: 1950  Date of evaluation: 1/12/2024    Procedure Summary       Date: 01/11/24 Room / Location: [facility] [place] ANGIO IR    Anesthesia Start: 2028 Anesthesia Stop: 2155    Procedure: IR MECHANICAL ART THROMBECTOMY INTRACRANIAL Diagnosis: (stroke)    Scheduled Providers: Grace Lantigua DO Responsible Provider: John Bose DO    Anesthesia Type: General ASA Status: 4 - Emergent            Anesthesia Type: General    Adrianne Phase I:      Adrianne Phase II:      Anesthesia Post Evaluation    Patient location during evaluation: PACU  Patient participation: complete - patient participated  Level of consciousness: awake and lethargic  Pain score: 0  Airway patency: patent  Nausea & Vomiting: no nausea  Cardiovascular status: hemodynamically stable  Respiratory status: acceptable  Hydration status: euvolemic  Pain management: adequate    No notable events documented.

## 2024-01-13 ENCOUNTER — TELEPHONE (OUTPATIENT)
Facility: HOSPITAL | Age: 74
End: 2024-01-13

## 2024-01-13 LAB
ANION GAP SERPL CALC-SCNC: 6 MMOL/L (ref 5–15)
ASPIRIN: 471 ARU
BASOPHILS # BLD: 0 K/UL (ref 0–0.1)
BASOPHILS NFR BLD: 0 % (ref 0–1)
BUN SERPL-MCNC: 14 MG/DL (ref 6–20)
BUN/CREAT SERPL: 13 (ref 12–20)
CALCIUM SERPL-MCNC: 8.2 MG/DL (ref 8.5–10.1)
CHLORIDE SERPL-SCNC: 112 MMOL/L (ref 97–108)
CO2 SERPL-SCNC: 26 MMOL/L (ref 21–32)
CREAT SERPL-MCNC: 1.04 MG/DL (ref 0.7–1.3)
DIFFERENTIAL METHOD BLD: ABNORMAL
EKG ATRIAL RATE: 62 BPM
EKG DIAGNOSIS: NORMAL
EKG P AXIS: 65 DEGREES
EKG P-R INTERVAL: 164 MS
EKG Q-T INTERVAL: 464 MS
EKG QRS DURATION: 82 MS
EKG QTC CALCULATION (BAZETT): 470 MS
EKG R AXIS: 40 DEGREES
EKG T AXIS: 34 DEGREES
EKG VENTRICULAR RATE: 62 BPM
EOSINOPHIL # BLD: 0.1 K/UL (ref 0–0.4)
EOSINOPHIL NFR BLD: 2 % (ref 0–7)
ERYTHROCYTE [DISTWIDTH] IN BLOOD BY AUTOMATED COUNT: 12.7 % (ref 11.5–14.5)
EST. AVERAGE GLUCOSE BLD GHB EST-MCNC: 111 MG/DL
GLUCOSE SERPL-MCNC: 87 MG/DL (ref 65–100)
HBA1C MFR BLD: 5.5 % (ref 4–5.6)
HCT VFR BLD AUTO: 40.8 % (ref 36.6–50.3)
HGB BLD-MCNC: 13.8 G/DL (ref 12.1–17)
IMM GRANULOCYTES # BLD AUTO: 0 K/UL (ref 0–0.04)
IMM GRANULOCYTES NFR BLD AUTO: 0 % (ref 0–0.5)
LYMPHOCYTES # BLD: 2 K/UL (ref 0.8–3.5)
LYMPHOCYTES NFR BLD: 26 % (ref 12–49)
MAGNESIUM SERPL-MCNC: 2 MG/DL (ref 1.6–2.4)
MCH RBC QN AUTO: 30.9 PG (ref 26–34)
MCHC RBC AUTO-ENTMCNC: 33.8 G/DL (ref 30–36.5)
MCV RBC AUTO: 91.3 FL (ref 80–99)
MONOCYTES # BLD: 1.1 K/UL (ref 0–1)
MONOCYTES NFR BLD: 14 % (ref 5–13)
NEUTS SEG # BLD: 4.4 K/UL (ref 1.8–8)
NEUTS SEG NFR BLD: 58 % (ref 32–75)
NRBC # BLD: 0 K/UL (ref 0–0.01)
NRBC BLD-RTO: 0 PER 100 WBC
PHOSPHATE SERPL-MCNC: 3.3 MG/DL (ref 2.6–4.7)
PLATELET # BLD AUTO: 169 K/UL (ref 150–400)
PMV BLD AUTO: 10.5 FL (ref 8.9–12.9)
POTASSIUM SERPL-SCNC: 3.8 MMOL/L (ref 3.5–5.1)
RBC # BLD AUTO: 4.47 M/UL (ref 4.1–5.7)
SODIUM SERPL-SCNC: 144 MMOL/L (ref 136–145)
WBC # BLD AUTO: 7.7 K/UL (ref 4.1–11.1)

## 2024-01-13 PROCEDURE — 85025 COMPLETE CBC W/AUTO DIFF WBC: CPT

## 2024-01-13 PROCEDURE — 80048 BASIC METABOLIC PNL TOTAL CA: CPT

## 2024-01-13 PROCEDURE — 97530 THERAPEUTIC ACTIVITIES: CPT

## 2024-01-13 PROCEDURE — 6360000002 HC RX W HCPCS: Performed by: NURSE PRACTITIONER

## 2024-01-13 PROCEDURE — 85576 BLOOD PLATELET AGGREGATION: CPT

## 2024-01-13 PROCEDURE — 97165 OT EVAL LOW COMPLEX 30 MIN: CPT

## 2024-01-13 PROCEDURE — 6370000000 HC RX 637 (ALT 250 FOR IP): Performed by: INTERNAL MEDICINE

## 2024-01-13 PROCEDURE — APPNB45 APP NON BILLABLE 31-45 MINUTES

## 2024-01-13 PROCEDURE — 6370000000 HC RX 637 (ALT 250 FOR IP)

## 2024-01-13 PROCEDURE — 99232 SBSQ HOSP IP/OBS MODERATE 35: CPT | Performed by: PSYCHIATRY & NEUROLOGY

## 2024-01-13 PROCEDURE — 84100 ASSAY OF PHOSPHORUS: CPT

## 2024-01-13 PROCEDURE — 2580000003 HC RX 258: Performed by: EMERGENCY MEDICINE

## 2024-01-13 PROCEDURE — 6370000000 HC RX 637 (ALT 250 FOR IP): Performed by: NURSE PRACTITIONER

## 2024-01-13 PROCEDURE — 36415 COLL VENOUS BLD VENIPUNCTURE: CPT

## 2024-01-13 PROCEDURE — 6360000002 HC RX W HCPCS: Performed by: INTERNAL MEDICINE

## 2024-01-13 PROCEDURE — 83735 ASSAY OF MAGNESIUM: CPT

## 2024-01-13 PROCEDURE — 93010 ELECTROCARDIOGRAM REPORT: CPT | Performed by: SPECIALIST

## 2024-01-13 PROCEDURE — 2580000003 HC RX 258: Performed by: NURSE PRACTITIONER

## 2024-01-13 PROCEDURE — 83036 HEMOGLOBIN GLYCOSYLATED A1C: CPT

## 2024-01-13 PROCEDURE — 2060000000 HC ICU INTERMEDIATE R&B

## 2024-01-13 PROCEDURE — 97161 PT EVAL LOW COMPLEX 20 MIN: CPT

## 2024-01-13 RX ORDER — LOSARTAN POTASSIUM 25 MG/1
25 TABLET ORAL DAILY
Status: DISCONTINUED | OUTPATIENT
Start: 2024-01-13 | End: 2024-01-23 | Stop reason: HOSPADM

## 2024-01-13 RX ORDER — POTASSIUM CHLORIDE 7.45 MG/ML
10 INJECTION INTRAVENOUS
Status: COMPLETED | OUTPATIENT
Start: 2024-01-13 | End: 2024-01-13

## 2024-01-13 RX ORDER — POTASSIUM CHLORIDE 7.45 MG/ML
10 INJECTION INTRAVENOUS
Status: DISCONTINUED | OUTPATIENT
Start: 2024-01-13 | End: 2024-01-13

## 2024-01-13 RX ADMIN — SODIUM CHLORIDE, PRESERVATIVE FREE 10 ML: 5 INJECTION INTRAVENOUS at 20:19

## 2024-01-13 RX ADMIN — SODIUM CHLORIDE, PRESERVATIVE FREE 10 ML: 5 INJECTION INTRAVENOUS at 19:56

## 2024-01-13 RX ADMIN — FINASTERIDE 5 MG: 5 TABLET, FILM COATED ORAL at 08:02

## 2024-01-13 RX ADMIN — POTASSIUM CHLORIDE 10 MEQ: 7.46 INJECTION, SOLUTION INTRAVENOUS at 07:42

## 2024-01-13 RX ADMIN — LOSARTAN POTASSIUM 25 MG: 25 TABLET, FILM COATED ORAL at 12:25

## 2024-01-13 RX ADMIN — ASPIRIN 81 MG CHEWABLE TABLET 81 MG: 81 TABLET CHEWABLE at 08:02

## 2024-01-13 RX ADMIN — ATORVASTATIN CALCIUM 40 MG: 40 TABLET, FILM COATED ORAL at 19:56

## 2024-01-13 RX ADMIN — ARFORMOTEROL TARTRATE: 15 SOLUTION RESPIRATORY (INHALATION) at 09:02

## 2024-01-13 RX ADMIN — SODIUM CHLORIDE, PRESERVATIVE FREE 10 ML: 5 INJECTION INTRAVENOUS at 08:03

## 2024-01-13 RX ADMIN — POTASSIUM CHLORIDE 10 MEQ: 7.46 INJECTION, SOLUTION INTRAVENOUS at 06:44

## 2024-01-13 ASSESSMENT — PAIN SCALES - WONG BAKER
WONGBAKER_NUMERICALRESPONSE: 0

## 2024-01-13 NOTE — PLAN OF CARE
Problem: Occupational Therapy - Adult  Goal: By Discharge: Performs self-care activities at highest level of function for planned discharge setting.  See evaluation for individualized goals.  Description: FUNCTIONAL STATUS PRIOR TO ADMISSION:  At time of evaluation pt unable to provide PLOF. Per chart and report from RN, patient was ambulatory using a rollator for functional mobility PTA.        HOME SUPPORT: Per chart, patient lived with his partner and was independent with ADLs.     Occupational Therapy Goals  Initiated 1/13/2024   1.  Patient will perform seated grooming routine with Set-up within 7 day(s).  2.  Patient will perform anterior neck to thigh bathing, in unsupported sitting, with Minimal Assist within 7 day(s).  3.  Patient will perform upper body dressing with Minimal Assist within 7 day(s).  4.  Patient will perform lower body dressing with Moderate Assist within 7 day(s).   5.  Patient will perform toilet transfers with Minimal Assist within 7 day(s).  6.  Patient will perform all aspects of toileting with Moderate Assist within 7 day(s).  7.  Patient will participate in upper extremity therapeutic exercise/activities with Supervision within 7 day(s).    8.  Patient will improve their Fugl Horner score by 5 points in prep for ADLs within 7 days.]  Outcome: Progressing   OCCUPATIONAL THERAPY EVALUATION    Patient: Cale Mohan (73 y.o. male)  Date: 1/13/2024  Primary Diagnosis: Acute cerebrovascular accident (CVA) ([facility]) [I63.9]         Precautions: Fall Risk                  ASSESSMENT :  The patient's performance of ADL/IADL tasks is limited at this time by impaired balance, activity tolerance, generalized weakness, coordination (RUE> LUE), and cognition (baseline dementia - memory, orientation, insight, safety awareness) s/p admission from home for stroke-like symptoms. Pt received TNK on admission and is s/p thrombectomy of L MCA on 1/11.     Pt received semi-supine, alert and oriented to self  chair alarm activated    COMMUNICATION/EDUCATION:   The patient's plan of care was discussed with: physical therapist and registered nurse    Patient Education  Education Given To: Patient  Education Provided: Role of Therapy;Plan of Care;Orientation;Precautions  Education Method: Demonstration;Verbal  Barriers to Learning: Cognition  Education Outcome: Unable to demonstrate understanding;Continued education needed    Thank you for this referral.  SOLANGE Trujillo, OTR/L  Minutes: 15    Occupational Therapy Evaluation Charge Determination   History Examination Decision-Making   LOW Complexity : Brief history review  LOW Complexity: 1-3 Performance deficits relating to physical, cognitive, or psychosocial skills that result in activity limitations and/or participation restrictions MEDIUM Complexity: Patient may present with comorbidities that affect occupational performance. Minimal to moderate modifications of tasks or assist (eg. physical or verbal) with assist is necessary to enable pt to complete eval   Based on the above components, the patient evaluation is determined to be of the following complexity level: Low

## 2024-01-13 NOTE — PROGRESS NOTES
[facility] Progress Note        Cale Mohan  1950  [id number]  1/13/2024      Assessment and plan:  Ischemic stroke:   -left MCA M2 occlusion s/p TNK AND mechanical thrombectomy of left MCA 1/11/24  -CTA head and neck reviewed and showed right ICA occlusion, basilar artery stenosis    -deficits are improved but he still has a facial droop and RLE weakness  -SBP > 160 is goal   -neurochecks q 2   -start ASA 81 mg daily- discussed with neuro interventional team   -post TNK HCT reviewed and shows no evidence of hemorrhage  -cont statin 40 q hs   -PT OT SLP  -cleared by SLP for swallow   -echo reviewed and shows EF 55%.  No  ASD      Incidental finding of gas in right face/neck:    -nonspecific  -nothing palpable on exam, no tenderness or pain.    -follow clinically  -If spikes fevers or develops pain would rescan     HTN:  -not on antihypertensives at home   -prns in place   -start losartan and titrate to goal BP      BPH:  -cont finasteride and tamsulosin    Dementia:  -at baseline   -poor short term memory     Discussed with hosptialist team and ok for transfer to floor      HPI:  Focal deficits are slightly improved.      K  3.8- replaced     Glc 87- at goal     Cr 1.04- this is baseline      Wbc 5.8- not concerned for infection   Hb 14- baseline   Plt 189    ICU DAILY CHECKLIST     Code Status:full   DVT Prophylaxis: SCDs, can start lovenox   T/L/D: PIVs  SUP: not indicated  Diet: ok for PO    Activity Level:mobilize daily.  PT OT   ABCDEF Bundle/Checklist Completed:Yes  Disposition: transfer to floor   Multidisciplinary Rounds Completed: yes  Goals of Care Discussion/Palliative: yes  Patient/Family Updated: yes      OBJECTIVE  Vitals:    01/13/24 0500 01/13/24 0600 01/13/24 0700 01/13/24 0800   BP: 131/80 127/76 (!) 151/99 123/81   Pulse: 69 64 80 68   Resp: 23 20 23 19   Temp:    98.3 °F (36.8 °C)   TempSrc:    Oral   SpO2: 96% 96% 94% 97%   Weight:       Height:         EXAM:   GEN:

## 2024-01-13 NOTE — PROGRESS NOTES
Neurology Staff Addendum:  I have personally seen and examined the patient. I have personally reviewed the chart and images. Elements of my examination included history of present illness, review of systems, review of past medical and surgical history, review of medications, and physical and neurological examination. I have personally reviewed the findings and impressions with the nurse practitioner and am in agreement with their note with changes below.     Pt is a 72yo RH male with HTN, HLD, not on APT/OAC at home, admitted 1/11/24 after found slumped over by wife, right arm shaking, left gaze preference, aphasic. LKN 1800. /102. NIHSS score 19. CTH neg. CTA H/N with left intracranial ICA occlusion extending to PCA, BA stenosis. CTP with mismatch in left PCA territory. S/p IV TNK at 1930 and left MCA thrombectomy.  TTE with EF 55-60%, LA normal, bubble study neg. EKG with NSR, prolonged QT. HgbA1C 5.6, .6.  Pt has been started on Lipitor 40mg daily.  MRI brain with acute left frontal small acute stroke, no hemorrhage. Pt has been started on ASA 81mg daily.        Blood pressure 98/76, pulse 80, temperature 99.2 °F (37.3 °C), temperature source Oral, resp. rate 20, height 1.829 m (6'), weight 86.9 kg (191 lb 9.3 oz), SpO2 100 %.    Physical Exam:  General: Well developed well nourished patient in no apparent distress.   Cardiac: Regular rate and rhythm with no murmurs.   Extremities: 2+ Radial pulses, no cyanosis or edema     Neurological Exam:  Mental Status: Oriented to Person, situation, not hospital, not month or year - baseline. Speech - no dysarthria.  Language intact -able to follow all commands today. Attention and fund of knowledge appropriate. Unable to provide history due to memory loss.    Cranial Nerves:   VF- unable to assess, due to pt factors. PERRL, EOM- no gaze preference, no nystagmus, no ptosis. Facial movement is symmetric.  Palate is midline. Tongue is midline. Hearing is intact

## 2024-01-13 NOTE — PLAN OF CARE
Problem: Physical Therapy - Adult  Goal: By Discharge: Performs mobility at highest level of function for planned discharge setting.  See evaluation for individualized goals.  Description: FUNCTIONAL STATUS PRIOR TO ADMISSION: patient ambulating with rollator    HOME SUPPORT PRIOR TO ADMISSION: patient lived with girlfriend who assisted with adls/iadls    Physical Therapy Goals  Initiated 1/13/2024  1.  Patient will move from supine to sit and sit to supine in bed with contact guard assist within 7 day(s).    2.  Patient will perform sit to stand with contact guard assist within 7 day(s).  3.  Patient will transfer from bed to chair and chair to bed with contact guard assist using the least restrictive device within 7 day(s).  4.  Patient will ambulate with contact guard assist for 50 feet with the least restrictive device within 7 day(s).   5.  Patient will improve Wild Balance score by 7 points within 7 days.   Outcome: Progressing       PHYSICAL THERAPY EVALUATION    Patient: Cale Mohan (73 y.o. male)  Date: 1/13/2024  Primary Diagnosis: Acute cerebrovascular accident (CVA) ([facility]) [I63.9]       Precautions: Fall Risk                      ASSESSMENT : Cale Mohan is a 73 y.o. male with a history of HTN and baseline dementia who presented to ED with complains of right sided weakness and aphasia secondary to CVA. Patient  received TNK and emergent mechanical thrombectomy.   DEFICITS/IMPAIRMENTS:   The patient is limited by decreased functional mobility, strength, body mechanics, activity tolerance, endurance, safety awareness, cognition, coordination, balance, posture who would benefit from PT to address these deficits.   Patient ambulated with rollator per patient report and chart, but demonstrated needing max assist to ambulate with rolling walker. Patient with difficulty with right lower ext stance, advancing left lower ext and needed max assist for mobility.     Recommend snf    Functional Outcome Measure:                                                                                          [facility] AM-PAC®      Basic Mobility Inpatient Short Form (6-Clicks) Version 2  How much HELP from another person do you currently need... (If the patient hasn't done an activity recently, how much help from another person do you think they would need if they tried?) Total A Lot A Little None   1.  Turning from your back to your side while in a flat bed without using bedrails? []  1 [x]  2 []  3  []  4   2.  Moving from lying on your back to sitting on the side of a flat bed without using bedrails? []  1 [x]  2 []  3  []  4   3.  Moving to and from a bed to a chair (including a wheelchair)? []  1 [x]  2 []  3  []  4   4. Standing up from a chair using your arms (e.g. wheelchair or bedside chair)? []  1 [x]  2 []  3  []  4   5.  Walking in hospital room? []  1 [x]  2 []  3  []  4   6.  Climbing 3-5 steps with a railing? [x]  1 []  2 []  3  []  4     Raw Score: 11/24                            Cutoff score ?171,2,3 had higher odds of discharging home with home health or need of SNF/IPR.    1. Yi Cabrera, Latisha Leung, Sunil Ye, Gaby Keller, Gary Jhaveri, Alexis Cabrera.  Validity of the AM-PAC “6-Clicks” Inpatient Daily Activity and Basic Mobility Short Forms. Physical Therapy Mar 2014, [phone] (3) 379-391; DOI: 10.2522/ptj.36230841  2. Beto STEVENS, Kaz POLANCO, Rani POLANCO, Clarice POLANCO. Association of AM-PAC \"6-Clicks\" Basic Mobility and Daily Activity Scores With Discharge Destination. Phys Ther. 2021 Apr 4;101(4):jrij708. doi: 10.1093/ptj/zxow059. PMID: 18719157.  3. Pablito POLANCO, Joan DIEGO, Alena S, Delia K, Julius S. Activity Measure for Post-Acute Care \"6-Clicks\" Basic Mobility Scores Predict Discharge Destination After Acute Care Hospitalization in Select Patient Groups: A Retrospective, Observational Study. Arch Rehabil Res Clin Transl. 2022 Jul 16;4(3):042978. doi: 10.1016/j.arrct.2022.322736.

## 2024-01-13 NOTE — TELEPHONE ENCOUNTER
Pt needs a hospital follow up appointment  Provider: Any  Location: Any  In person or virtual: In person  When: 4-6 weeks  Diagnosis/reason for follow up:  stroke f/u and evaluation of memory  Additional information: Please call wife/constantino

## 2024-01-13 NOTE — H&P
History and Physical    Date of Service:  1/13/2024  Primary Care Provider: No primary care provider on file.  Source of information: Patient, Chart Review    Chief Complaint: Cerebrovascular Accident (Patient found slumped over by wife. Right arm contracted, left gaze, aphasic. Last known well 6pm today. )      History of Presenting Illness:   Per H&P: Cale Mohan is a 73 y.o. male with a history of HTN who presented to ED with complains of right sided weakness and aphasia. LKW at 1800. Patient attempted to get up to the bathroom sometime between 4727-8704 and she noticed that the patient's right arm was shaking and that he could not speak . EMS called and Code S called. Initial NIHSS 19. CT done, no hemorrhage and L ICA occlusion noted on CTA head and neck. Also non specific gas pockets in the right upper neck soft tissues noted. Tele neuro evaluated and deemed patient a candidate for TNK. Patient not on any anticoagulation. TNK was given at 1930. NIS was also notified. Patient was taken to angio for emergent mechanical thrombectomy. Intubated for procedure and then extubated and transferred to ICU post procedure for continued management.\"    Patient seen today by hospitalist for transfer to [facility]. He is AAOx1-2 which is his baseline. He denies any headache, vision changes, numbness or tingling, or any focal or generalized neurologic deficits.      REVIEW OF SYSTEMS:  A comprehensive review of systems was negative except for that written in the History of Present Illness.     History reviewed. No pertinent past medical history.   History reviewed. No pertinent surgical history.  Prior to Admission medications    Not on File     No Known Allergies   History reviewed. No pertinent family history.   Social History:     Social Determinants of Health     Tobacco Use: Not on file   Alcohol Use: Not on file   Financial Resource Strain: Not on file   Food Insecurity: Not on file   Transportation Needs: Not on

## 2024-01-14 LAB
ANION GAP SERPL CALC-SCNC: 6 MMOL/L (ref 5–15)
BASOPHILS # BLD: 0 K/UL (ref 0–0.1)
BASOPHILS NFR BLD: 0 % (ref 0–1)
BUN SERPL-MCNC: 13 MG/DL (ref 6–20)
BUN/CREAT SERPL: 11 (ref 12–20)
CALCIUM SERPL-MCNC: 8.8 MG/DL (ref 8.5–10.1)
CHLORIDE SERPL-SCNC: 110 MMOL/L (ref 97–108)
CO2 SERPL-SCNC: 28 MMOL/L (ref 21–32)
CREAT SERPL-MCNC: 1.15 MG/DL (ref 0.7–1.3)
DIFFERENTIAL METHOD BLD: ABNORMAL
EOSINOPHIL # BLD: 0.2 K/UL (ref 0–0.4)
EOSINOPHIL NFR BLD: 2 % (ref 0–7)
ERYTHROCYTE [DISTWIDTH] IN BLOOD BY AUTOMATED COUNT: 12.7 % (ref 11.5–14.5)
GLUCOSE SERPL-MCNC: 88 MG/DL (ref 65–100)
HCT VFR BLD AUTO: 43.7 % (ref 36.6–50.3)
HGB BLD-MCNC: 14.2 G/DL (ref 12.1–17)
IMM GRANULOCYTES # BLD AUTO: 0 K/UL (ref 0–0.04)
IMM GRANULOCYTES NFR BLD AUTO: 1 % (ref 0–0.5)
LYMPHOCYTES # BLD: 1.8 K/UL (ref 0.8–3.5)
LYMPHOCYTES NFR BLD: 25 % (ref 12–49)
MAGNESIUM SERPL-MCNC: 2.1 MG/DL (ref 1.6–2.4)
MCH RBC QN AUTO: 30.2 PG (ref 26–34)
MCHC RBC AUTO-ENTMCNC: 32.5 G/DL (ref 30–36.5)
MCV RBC AUTO: 93 FL (ref 80–99)
MONOCYTES # BLD: 0.9 K/UL (ref 0–1)
MONOCYTES NFR BLD: 13 % (ref 5–13)
NEUTS SEG # BLD: 4.3 K/UL (ref 1.8–8)
NEUTS SEG NFR BLD: 59 % (ref 32–75)
NRBC # BLD: 0 K/UL (ref 0–0.01)
NRBC BLD-RTO: 0 PER 100 WBC
PHOSPHATE SERPL-MCNC: 3.6 MG/DL (ref 2.6–4.7)
PLATELET # BLD AUTO: 200 K/UL (ref 150–400)
PMV BLD AUTO: 10.2 FL (ref 8.9–12.9)
POTASSIUM SERPL-SCNC: 3.9 MMOL/L (ref 3.5–5.1)
RBC # BLD AUTO: 4.7 M/UL (ref 4.1–5.7)
SODIUM SERPL-SCNC: 144 MMOL/L (ref 136–145)
WBC # BLD AUTO: 7.2 K/UL (ref 4.1–11.1)

## 2024-01-14 PROCEDURE — 2060000000 HC ICU INTERMEDIATE R&B

## 2024-01-14 PROCEDURE — 6370000000 HC RX 637 (ALT 250 FOR IP)

## 2024-01-14 PROCEDURE — 6370000000 HC RX 637 (ALT 250 FOR IP): Performed by: INTERNAL MEDICINE

## 2024-01-14 PROCEDURE — 83735 ASSAY OF MAGNESIUM: CPT

## 2024-01-14 PROCEDURE — 84100 ASSAY OF PHOSPHORUS: CPT

## 2024-01-14 PROCEDURE — 6370000000 HC RX 637 (ALT 250 FOR IP): Performed by: NURSE PRACTITIONER

## 2024-01-14 PROCEDURE — 36415 COLL VENOUS BLD VENIPUNCTURE: CPT

## 2024-01-14 PROCEDURE — 80048 BASIC METABOLIC PNL TOTAL CA: CPT

## 2024-01-14 PROCEDURE — 85025 COMPLETE CBC W/AUTO DIFF WBC: CPT

## 2024-01-14 RX ORDER — TAMSULOSIN HYDROCHLORIDE 0.4 MG/1
0.4 CAPSULE ORAL DAILY
COMMUNITY
Start: 2024-01-09

## 2024-01-14 RX ORDER — FINASTERIDE 5 MG/1
5 TABLET, FILM COATED ORAL DAILY
COMMUNITY
Start: 2024-01-09

## 2024-01-14 RX ORDER — FLUTICASONE PROPIONATE AND SALMETEROL 100; 50 UG/1; UG/1
1 POWDER RESPIRATORY (INHALATION) 2 TIMES DAILY
COMMUNITY
Start: 2024-01-09

## 2024-01-14 RX ADMIN — ASPIRIN 81 MG CHEWABLE TABLET 81 MG: 81 TABLET CHEWABLE at 08:08

## 2024-01-14 RX ADMIN — FINASTERIDE 5 MG: 5 TABLET, FILM COATED ORAL at 08:08

## 2024-01-14 RX ADMIN — LOSARTAN POTASSIUM 25 MG: 25 TABLET, FILM COATED ORAL at 08:08

## 2024-01-14 RX ADMIN — TAMSULOSIN HYDROCHLORIDE 0.4 MG: 0.4 CAPSULE ORAL at 20:24

## 2024-01-14 RX ADMIN — ATORVASTATIN CALCIUM 40 MG: 40 TABLET, FILM COATED ORAL at 20:24

## 2024-01-14 ASSESSMENT — PAIN SCALES - WONG BAKER
WONGBAKER_NUMERICALRESPONSE: 0

## 2024-01-14 NOTE — ED PROVIDER NOTES
01/14/24 0200 01/14/24 0300   BP: (!) 125/100 120/76 114/68 124/68   Pulse: 67 72 65 72   Resp: 18 17 15 23   Temp: 98.3 °F (36.8 °C)      TempSrc: Axillary      SpO2: 100% 99% 97% 97%   Weight:       Height:               Medical Decision Making  Amount and/or Complexity of Data Reviewed  Labs: ordered.  Radiology: ordered.  ECG/medicine tests: ordered.    Risk  Prescription drug management.  Decision regarding hospitalization.      DDX: syncope, seizure, meningitis, stroke, sepsis, subarachnoid hemorrhage, intracranial bleeding, encephalitis, encephalopathy    Chiquita Alatorre MD has spent 40 minutes of critical care time involved in lab review, consultations with specialist, family decision-making, and documentation.  During this entire length of time I was immediately available to the patient.    Critical Care:  The reason for providing this level of medical care for this critically ill patient was due a critical illness that impaired one or more vital organ systems such that there was a high probability of imminent or life threatening deterioration in the patients condition. This care involved high complexity decision making to assess, manipulate, and support vital system functions, to treat this degreee vital organ system failure and to prevent further life threatening deterioration of the patient’s condition.        REASSESSMENT     ED Course as of 01/14/24 0403   Thu Jan 11, 2024 1930 Patient presentation was discussed with Dr. De La Vega, teleneurology, who examined the patient and agreed that he was a candidate for TNK.  Risks and benefits were discussed with the patient's partner who agreed that administration of the drug was in the patient's best interest.   [IO]   1951 Neurointerventional team plans to take the patient for intervention. [IO]      ED Course User Index  [IO] Chiquita Alatorre MD           CONSULTS:  IP CONSULT TO NEUROLOGY  IP CONSULT TO NEUROINTERVENTIONAL SURGERY    PROCEDURES:  Unless

## 2024-01-14 NOTE — PROGRESS NOTES
4 Eyes Skin Assessment     NAME:  Cale Mohan  YOB: 1950  MEDICAL RECORD NUMBER:  [id number]    The patient is being assessed for  Other skin assessment     I agree that at least one RN has performed a thorough Head to Toe Skin Assessment on the patient. ALL assessment sites listed below have been assessed.      Areas assessed by both nurses:    Head, Face, Ears, Shoulders, Back, Chest, Arms, Elbows, Hands, Sacrum. Buttock, Coccyx, Ischium, Legs. Feet and Heels, and Under Medical Devices         Does the Patient have a Wound? No noted wound(s)       Craig Prevention initiated by RN: No  Wound Care Orders initiated by RN: No    Pressure Injury (Stage 3,4, Unstageable, DTI, NWPT, and Complex wounds) if present, place Wound referral order by RN under : No    New Ostomies, if present place, Ostomy referral order under : No     Nurse 1 eSignature: Electronically signed by Lala Olsen RN on 1/14/24 at 9:35 AM EST    **SHARE this note so that the co-signing nurse can place an eSignature**    Nurse 2 eSignature: {Esignature:[id number]}

## 2024-01-14 NOTE — PROGRESS NOTES
Progress note  Cody Brown MD  Date of service:01/14/24        Transferred out of the ICU on 1/13/2020    History of Presenting Illness:   Per H&P: Cale Mohan is a 73 y.o. male with a history of HTN who presented to ED with complains of right sided weakness and aphasia. LKW at 1800. Patient attempted to get up to the bathroom sometime between 3976-0211 and she noticed that the patient's right arm was shaking and that he could not speak . EMS called and Code S called. Initial NIHSS 19. CT done, no hemorrhage and L ICA occlusion noted on CTA head and neck. Also non specific gas pockets in the right upper neck soft tissues noted. Tele neuro evaluated and deemed patient a candidate for TNK. Patient not on any anticoagulation. TNK was given at 1930. NIS was also notified. Patient was taken to angio for emergent mechanical thrombectomy. Intubated for procedure and then extubated and transferred to ICU post procedure for continued management.\"           Subjective/interval history   Patient transferred out of the ICU on 1/13 but still boarding in the ICU.  Patient seen and examined in the ICU.  He is eating breakfast, slow but interactive.  He denied any complaints.  On exam, he has slight right-sided weakness.  No family at the bedside.  No one is listed under emergency contact  I called the number listed as the patient's home phone, no answer.    Assessment and plan   Left MCA territory ischemic stroke with occlusion of M2.  - S/P TNK and mechanical thrombectomy of left MCA on 1/11  - Repeat imaging with no evidence of hemorrhage  - SBP > 160  - ASA/statin  - Neurochecks  - PT/OT following  -Dysphagia evaluation completed, on soft and bite sized diet  - TTE unremarkable, normal EF, no ASD  -PT/OT evaluated, recommending SNF.    Incidental finding of gas in right face/neck:    -nonspecific  -nothing palpable on exam, no tenderness or pain.    -follow clinically  - Repeat imaging if patient develops

## 2024-01-14 NOTE — PROGRESS NOTES
Detail Level: Zone His significant other Magalis Thurston called back. Her number is [phone].Updated,let her know of recommendations for SNF rehab on discharge.   Sarecycline Counseling: Patient advised regarding possible photosensitivity and discoloration of the teeth, skin, lips, tongue and gums. Patient instructed to avoid sunlight, if possible. When exposed to sunlight, patients should wear protective clothing, sunglasses, and sunscreen. The patient was instructed to call the office immediately if the following severe adverse effects occur:  hearing changes, easy bruising/bleeding, severe headache, or vision changes. The patient verbalized understanding of the proper use and possible adverse effects of sarecycline. All of the patient's questions and concerns were addressed. Benzoyl Peroxide Counseling: Patient counseled that medicine may cause skin irritation and bleach clothing. In the event of skin irritation, the patient was advised to reduce the amount of the drug applied or use it less frequently. The patient verbalized understanding of the proper use and possible adverse effects of benzoyl peroxide. All of the patient's questions and concerns were addressed. Include Pregnancy/Lactation Warning?: No High Dose Vitamin A Counseling: Side effects reviewed, pt to contact office should one occur. Bactrim Pregnancy And Lactation Text: This medication is Pregnancy Category D and is known to cause fetal risk. It is also excreted in breast milk. Tazorac Counseling:  Patient advised that medication is irritating and drying. Patient may need to apply sparingly and wash off after an hour before eventually leaving it on overnight. The patient verbalized understanding of the proper use and possible adverse effects of tazorac. All of the patient's questions and concerns were addressed. Topical Retinoid Pregnancy And Lactation Text: This medication is Pregnancy Category C. It is unknown if this medication is excreted in breast milk. Topical Clindamycin Pregnancy And Lactation Text: This medication is Pregnancy Category B and is considered safe during pregnancy. It is unknown if it is excreted in breast milk. Azithromycin Counseling:  I discussed with the patient the risks of azithromycin including but not limited to GI upset, allergic reaction, drug rash, diarrhea, and yeast infections. Doxycycline Counseling:  Patient counseled regarding possible photosensitivity and increased risk for sunburn. Patient instructed to avoid sunlight, if possible. When exposed to sunlight, patients should wear protective clothing, sunglasses, and sunscreen. The patient was instructed to call the office immediately if the following severe adverse effects occur:  hearing changes, easy bruising/bleeding, severe headache, or vision changes. The patient verbalized understanding of the proper use and possible adverse effects of doxycycline. All of the patient's questions and concerns were addressed. Benzoyl Peroxide Pregnancy And Lactation Text: This medication is Pregnancy Category C. It is unknown if benzoyl peroxide is excreted in breast milk. Birth Control Pills Counseling: Birth Control Pill Counseling: I discussed with the patient the potential side effects of OCPs including but not limited to increased risk of stroke, heart attack, thrombophlebitis, deep venous thrombosis, hepatic adenomas, breast changes, GI upset, headaches, and depression. The patient verbalized understanding of the proper use and possible adverse effects of OCPs. All of the patient's questions and concerns were addressed. Spironolactone Pregnancy And Lactation Text: This medication can cause feminization of the male fetus and should be avoided during pregnancy. The active metabolite is also found in breast milk. Erythromycin Pregnancy And Lactation Text: This medication is Pregnancy Category B and is considered safe during pregnancy. It is also excreted in breast milk. Dapsone Pregnancy And Lactation Text: This medication is Pregnancy Category C and is not considered safe during pregnancy or breast feeding. High Dose Vitamin A Pregnancy And Lactation Text: High dose vitamin A therapy is contraindicated during pregnancy and breast feeding. Sarecycline Pregnancy And Lactation Text: This medication is Pregnancy Category D and not consider safe during pregnancy. It is also excreted in breast milk. Topical Sulfur Applications Counseling: Topical Sulfur Counseling: Patient counseled that this medication may cause skin irritation or allergic reactions. In the event of skin irritation, the patient was advised to reduce the amount of the drug applied or use it less frequently. The patient verbalized understanding of the proper use and possible adverse effects of topical sulfur application. All of the patient's questions and concerns were addressed. Spironolactone Counseling: Patient advised regarding risks of diarrhea, abdominal pain, hyperkalemia, birth defects (for female patients), liver toxicity and renal toxicity. The patient may need blood work to monitor liver and kidney function and potassium levels while on therapy. The patient verbalized understanding of the proper use and possible adverse effects of spironolactone. All of the patient's questions and concerns were addressed. Tetracycline Counseling: Patient counseled regarding possible photosensitivity and increased risk for sunburn. Patient instructed to avoid sunlight, if possible. When exposed to sunlight, patients should wear protective clothing, sunglasses, and sunscreen. The patient was instructed to call the office immediately if the following severe adverse effects occur:  hearing changes, easy bruising/bleeding, severe headache, or vision changes. The patient verbalized understanding of the proper use and possible adverse effects of tetracycline. All of the patient's questions and concerns were addressed. Patient understands to avoid pregnancy while on therapy due to potential birth defects. Azithromycin Pregnancy And Lactation Text: This medication is considered safe during pregnancy and is also secreted in breast milk. Isotretinoin Counseling: Patient should get monthly blood tests, not donate blood, not drive at night if vision affected, not share medication, and not undergo elective surgery for 6 months after tx completed. Side effects reviewed, pt to contact office should one occur. Minocycline Counseling: Patient advised regarding possible photosensitivity and discoloration of the teeth, skin, lips, tongue and gums. Patient instructed to avoid sunlight, if possible. When exposed to sunlight, patients should wear protective clothing, sunglasses, and sunscreen. The patient was instructed to call the office immediately if the following severe adverse effects occur:  hearing changes, easy bruising/bleeding, severe headache, or vision changes. The patient verbalized understanding of the proper use and possible adverse effects of minocycline. All of the patient's questions and concerns were addressed. Topical Clindamycin Counseling: Patient counseled that this medication may cause skin irritation or allergic reactions. In the event of skin irritation, the patient was advised to reduce the amount of the drug applied or use it less frequently. The patient verbalized understanding of the proper use and possible adverse effects of clindamycin. All of the patient's questions and concerns were addressed. Topical Retinoid counseling:  Patient advised to apply a pea-sized amount only at bedtime and wait 30 minutes after washing their face before applying. If too drying, patient may add a non-comedogenic moisturizer. The patient verbalized understanding of the proper use and possible adverse effects of retinoids. All of the patient's questions and concerns were addressed. Topical Sulfur Applications Pregnancy And Lactation Text: This medication is Pregnancy Category C and has an unknown safety profile during pregnancy. It is unknown if this topical medication is excreted in breast milk. Tazorac Pregnancy And Lactation Text: This medication is not safe during pregnancy. It is unknown if this medication is excreted in breast milk. Bactrim Counseling:  I discussed with the patient the risks of sulfa antibiotics including but not limited to GI upset, allergic reaction, drug rash, diarrhea, dizziness, photosensitivity, and yeast infections. Rarely, more serious reactions can occur including but not limited to aplastic anemia, agranulocytosis, methemoglobinemia, blood dyscrasias, liver or kidney failure, lung infiltrates or desquamative/blistering drug rashes. Erythromycin Counseling:  I discussed with the patient the risks of erythromycin including but not limited to GI upset, allergic reaction, drug rash, diarrhea, increase in liver enzymes, and yeast infections. Dapsone Counseling: I discussed with the patient the risks of dapsone including but not limited to hemolytic anemia, agranulocytosis, rashes, methemoglobinemia, kidney failure, peripheral neuropathy, headaches, GI upset, and liver toxicity. Patients who start dapsone require monitoring including baseline LFTs and weekly CBCs for the first month, then every month thereafter. The patient verbalized understanding of the proper use and possible adverse effects of dapsone. All of the patient's questions and concerns were addressed. Birth Control Pills Pregnancy And Lactation Text: This medication should be avoided if pregnant and for the first 30 days post-partum. Doxycycline Pregnancy And Lactation Text: This medication is Pregnancy Category D and not consider safe during pregnancy. It is also excreted in breast milk but is considered safe for shorter treatment courses. Isotretinoin Pregnancy And Lactation Text: This medication is Pregnancy Category X and is considered extremely dangerous during pregnancy. It is unknown if it is excreted in breast milk.

## 2024-01-14 NOTE — PROGRESS NOTES
MRI brain reviewed by radiology, new interpretation:  Several foci of restricted diffusion consistent with acute infarction in left MCA distribution as well as 2 small adjacent foci in the right  cerebellum.   This would suggest a cardioembolic etiology.  ASA Test is therapeutic.  Recommend cardiac monitoring for Afib at discharge.   Please call with questions.

## 2024-01-15 LAB
ANION GAP SERPL CALC-SCNC: 4 MMOL/L (ref 5–15)
BASOPHILS # BLD: 0 K/UL (ref 0–0.1)
BASOPHILS NFR BLD: 0 % (ref 0–1)
BUN SERPL-MCNC: 12 MG/DL (ref 6–20)
BUN/CREAT SERPL: 11 (ref 12–20)
CALCIUM SERPL-MCNC: 8.8 MG/DL (ref 8.5–10.1)
CHLORIDE SERPL-SCNC: 112 MMOL/L (ref 97–108)
CO2 SERPL-SCNC: 25 MMOL/L (ref 21–32)
CREAT SERPL-MCNC: 1.1 MG/DL (ref 0.7–1.3)
DIFFERENTIAL METHOD BLD: NORMAL
ECHO BSA: 2.09 M2
EOSINOPHIL # BLD: 0.2 K/UL (ref 0–0.4)
EOSINOPHIL NFR BLD: 3 % (ref 0–7)
ERYTHROCYTE [DISTWIDTH] IN BLOOD BY AUTOMATED COUNT: 12.6 % (ref 11.5–14.5)
GLUCOSE SERPL-MCNC: 95 MG/DL (ref 65–100)
HCT VFR BLD AUTO: 44.9 % (ref 36.6–50.3)
HGB BLD-MCNC: 14.8 G/DL (ref 12.1–17)
IMM GRANULOCYTES # BLD AUTO: 0 K/UL (ref 0–0.04)
IMM GRANULOCYTES NFR BLD AUTO: 0 % (ref 0–0.5)
LYMPHOCYTES # BLD: 1.7 K/UL (ref 0.8–3.5)
LYMPHOCYTES NFR BLD: 24 % (ref 12–49)
MAGNESIUM SERPL-MCNC: 2.2 MG/DL (ref 1.6–2.4)
MCH RBC QN AUTO: 30.3 PG (ref 26–34)
MCHC RBC AUTO-ENTMCNC: 33 G/DL (ref 30–36.5)
MCV RBC AUTO: 91.8 FL (ref 80–99)
MONOCYTES # BLD: 0.8 K/UL (ref 0–1)
MONOCYTES NFR BLD: 11 % (ref 5–13)
NEUTS SEG # BLD: 4.3 K/UL (ref 1.8–8)
NEUTS SEG NFR BLD: 62 % (ref 32–75)
NRBC # BLD: 0 K/UL (ref 0–0.01)
NRBC BLD-RTO: 0 PER 100 WBC
PHOSPHATE SERPL-MCNC: 2.8 MG/DL (ref 2.6–4.7)
PLATELET # BLD AUTO: 189 K/UL (ref 150–400)
PMV BLD AUTO: 9.5 FL (ref 8.9–12.9)
POTASSIUM SERPL-SCNC: 3.9 MMOL/L (ref 3.5–5.1)
RBC # BLD AUTO: 4.89 M/UL (ref 4.1–5.7)
SODIUM SERPL-SCNC: 141 MMOL/L (ref 136–145)
WBC # BLD AUTO: 6.9 K/UL (ref 4.1–11.1)

## 2024-01-15 PROCEDURE — 99222 1ST HOSP IP/OBS MODERATE 55: CPT | Performed by: INTERNAL MEDICINE

## 2024-01-15 PROCEDURE — 97112 NEUROMUSCULAR REEDUCATION: CPT

## 2024-01-15 PROCEDURE — 0JH632Z INSERTION OF MONITORING DEVICE INTO CHEST SUBCUTANEOUS TISSUE AND FASCIA, PERCUTANEOUS APPROACH: ICD-10-PCS | Performed by: INTERNAL MEDICINE

## 2024-01-15 PROCEDURE — 97530 THERAPEUTIC ACTIVITIES: CPT

## 2024-01-15 PROCEDURE — 85025 COMPLETE CBC W/AUTO DIFF WBC: CPT

## 2024-01-15 PROCEDURE — 2060000000 HC ICU INTERMEDIATE R&B

## 2024-01-15 PROCEDURE — B3171ZZ FLUOROSCOPY OF LEFT INTERNAL CAROTID ARTERY USING LOW OSMOLAR CONTRAST: ICD-10-PCS | Performed by: INTERNAL MEDICINE

## 2024-01-15 PROCEDURE — 6370000000 HC RX 637 (ALT 250 FOR IP)

## 2024-01-15 PROCEDURE — 2500000003 HC RX 250 WO HCPCS: Performed by: INTERNAL MEDICINE

## 2024-01-15 PROCEDURE — 6370000000 HC RX 637 (ALT 250 FOR IP): Performed by: NURSE PRACTITIONER

## 2024-01-15 PROCEDURE — 6370000000 HC RX 637 (ALT 250 FOR IP): Performed by: INTERNAL MEDICINE

## 2024-01-15 PROCEDURE — 2709999900 HC NON-CHARGEABLE SUPPLY: Performed by: INTERNAL MEDICINE

## 2024-01-15 PROCEDURE — 36415 COLL VENOUS BLD VENIPUNCTURE: CPT

## 2024-01-15 PROCEDURE — 97535 SELF CARE MNGMENT TRAINING: CPT

## 2024-01-15 PROCEDURE — 80048 BASIC METABOLIC PNL TOTAL CA: CPT

## 2024-01-15 PROCEDURE — 33285 INSJ SUBQ CAR RHYTHM MNTR: CPT | Performed by: INTERNAL MEDICINE

## 2024-01-15 PROCEDURE — 84100 ASSAY OF PHOSPHORUS: CPT

## 2024-01-15 PROCEDURE — C1764 EVENT RECORDER, CARDIAC: HCPCS | Performed by: INTERNAL MEDICINE

## 2024-01-15 PROCEDURE — 83735 ASSAY OF MAGNESIUM: CPT

## 2024-01-15 DEVICE — ICM LNQ22 LINQ II USA
Type: IMPLANTABLE DEVICE | Status: FUNCTIONAL
Brand: LINQ II™

## 2024-01-15 RX ADMIN — FINASTERIDE 5 MG: 5 TABLET, FILM COATED ORAL at 08:23

## 2024-01-15 RX ADMIN — TAMSULOSIN HYDROCHLORIDE 0.4 MG: 0.4 CAPSULE ORAL at 20:39

## 2024-01-15 RX ADMIN — LOSARTAN POTASSIUM 25 MG: 25 TABLET, FILM COATED ORAL at 08:23

## 2024-01-15 RX ADMIN — ASPIRIN 81 MG CHEWABLE TABLET 81 MG: 81 TABLET CHEWABLE at 08:23

## 2024-01-15 RX ADMIN — ATORVASTATIN CALCIUM 40 MG: 40 TABLET, FILM COATED ORAL at 20:39

## 2024-01-15 ASSESSMENT — PAIN SCALES - WONG BAKER: WONGBAKER_NUMERICALRESPONSE: 0

## 2024-01-15 ASSESSMENT — PAIN SCALES - GENERAL
PAINLEVEL_OUTOF10: 0
PAINLEVEL_OUTOF10: 0

## 2024-01-15 NOTE — DISCHARGE INSTRUCTIONS
Discharge Instructions       PATIENT ID: Cale Mohan  MRN: [id number]   YOB: 1950    DATE OF ADMISSION: 1/11/2024   DATE OF DISCHARGE: 1/23/2024    PRIMARY CARE PROVIDER: None, None     ATTENDING PHYSICIAN: Adela Rene MD   DISCHARGING PROVIDER: Elizabeth Damon PA-C    To contact this individual call [phone] and ask the  to page.   If unavailable ask to be transferred the Adult Hospitalist Department.    DISCHARGE DIAGNOSES CVA, R sided weakness    Left MCA territory ischemic stroke with occlusion of M2.  Right hemiparesis due to the acute stroke  MRI findings suggestive of a cardio embolic [phenomenon   - S/P TNK and mechanical thrombectomy of left MCA on 1/11  - Repeat imaging with no evidence of hemorrhage  - ASA/statin  - seen by PT/OT   -Dysphagia evaluation completed, on soft and bite sized diet  - TTE unremarkable, normal EF, no ASD  -Status post ILR 1/15, follow up with cardio as below      Incidental finding of gas in right face/neck:    -nonspecific  -nothing palpable on exam, no tenderness or pain.    -follow clinically  - Repeat imaging if patient develops fever  -Serial exam  - no further intervention at this point      HTN  - Started on losartan this admission  - monitor      BPH  - Continue finasteride, tamsulosin     Dementia  - AAOx1-2 at baseline  - Supportive care       CONSULTATIONS: IP CONSULT TO NEUROLOGY  IP CONSULT TO NEUROINTERVENTIONAL SURGERY  IP CONSULT TO CASE MANAGEMENT  IP CONSULT TO CARDIOLOGY  IP CONSULT TO PHYSICAL MEDICINE REHAB    PROCEDURES/SURGERIES: Procedure(s):  Loop recorder insert    PENDING TEST RESULTS:   At the time of discharge the following test results are still pending: N/a    FOLLOW UP APPOINTMENTS:   Sherly Latham MD  Internal Medicine Cardiovascular Disease [phone] [address] [address]      Next Steps: Follow up    Sherly Latham MD  Internal Medicine Cardiovascular Disease [phone]  Cardiology    [facility]  [facility] [place]  [address] [address], [address]        [address] [facility], [address]  [address]  [address] [phone] / [phone] Fax       [phone] / [phone] Fax

## 2024-01-15 NOTE — PROCEDURES
Loop Implant    Procedure Date: 01/15/24  Lab Physician: Sherly Latham MD    INDICATIONS:  Cryptogenic Stroke    PROCEDURE NARRATIVE  After obtaining informed consent, the central chest was prepped and draped in sterile fashion creating a sterile site just lateral to the left side of the sternum at about the 5th intercostal space. This area was infiltrated with lidocaine with epinephrine for local anesthesia. A 1 cm puncture was made with the provided puncture tool and a track was created with the Reveal insertion tool. The Reveal Linq was deployed subcutaneously and the insertion tool was removed. The skin closed with a single layer of 4-0 Vicryl suture and dermabond was applied on the incision. Gauze and a sterile bio-occlusive dressing was applied over the incision. The procedure was well tolerated and there were no immediate complications.    Device: EquipboardI ([id number])  Serial #: [id number]    CONCLUSIONS  Successful ILR insertion

## 2024-01-15 NOTE — PLAN OF CARE
Problem: Physical Therapy - Adult  Goal: By Discharge: Performs mobility at highest level of function for planned discharge setting.  See evaluation for individualized goals.  Description: FUNCTIONAL STATUS PRIOR TO ADMISSION: patient ambulating with rollator    HOME SUPPORT PRIOR TO ADMISSION: patient lived with girlfriend who assisted with adls/iadls    Physical Therapy Goals  Initiated 1/13/2024  1.  Patient will move from supine to sit and sit to supine in bed with contact guard assist within 7 day(s).    2.  Patient will perform sit to stand with contact guard assist within 7 day(s).  3.  Patient will transfer from bed to chair and chair to bed with contact guard assist using the least restrictive device within 7 day(s).  4.  Patient will ambulate with contact guard assist for 50 feet with the least restrictive device within 7 day(s).   5.  Patient will improve Wild Balance score by 7 points within 7 days.   Outcome: Progressing       PHYSICAL THERAPY TREATMENT    Patient: Cale Mohan (73 y.o. male)  Date: 1/15/2024  Diagnosis: Acute cerebrovascular accident (CVA) ([facility]) [I63.9] Acute cerebrovascular accident (CVA) ([facility])      Precautions: Fall Risk                    ASSESSMENT:  Patient continues to benefit from skilled PT services and is progressing towards goals. Patient demonstrated improved sitting balance - able to perform functional task reaching across midline with stand by assist. Patient stood with rolling walker with min to mod assist secondary to posterior lean. Patient ambulated with decreased right step length, decreased right foot clearance.   Patient may benefit from IPR - increased participation with PT.          PLAN:  Patient continues to benefit from skilled intervention to address the above impairments.  Continue treatment per established plan of care.    Recommendation for discharge: (in order for the patient to meet his/her long term goals): Therapy 3 hours/day 5-7 days/week    Other

## 2024-01-15 NOTE — PROGRESS NOTES
Spiritual Care Assessment/Progress Note  [facility]    Name: Cale Mohan MRN: [id number]    Age: 73 y.o.     Sex: male   Language: English     Date: 1/15/2024            Total Time Calculated: 15 min              Spiritual Assessment begun in [facility] 7S2 INTENSIVE CARE  Service Provided For:: Patient  Referral/Consult From:: Volunteer  Encounter Overview/Reason : Spiritual/Emotional Needs, Initial Encounter    Spiritual beliefs:      [] Involved in a florencia tradition/spiritual practice:      [] Supported by a florencia community:      [x] Claims no spiritual orientation:      [] Seeking spiritual identity:           [] Adheres to an individual form of spirituality:      [] Not able to assess:                Identified resources for coping and support system:   Support System: Friends/neighbors       [] Prayer                  [] Devotional reading               [] Music                  [] Guided Imagery     [] Pet visits                                        [] Other: (COMMENT)     Specific area/focus of visit   Encounter:    Crisis:    Spiritual/Emotional needs: Type: Spiritual Support  Ritual, Rites and Sacraments:    Grief, Loss, and Adjustments:    Ethics/Mediation:    Behavioral Health:    Palliative Care:    Advance Care Planning:      Plan/Referrals: Continue Support (comment)    Narrative:     Initial assessment for Cale Mohan. I introduced myself, role, and explored coping and spiritual beliefs. Mr. Mohan stated that he was doing well. His friend was visiting regularly. Mr. Mohan reports that he believes in God, but does not associate with any specific florencia tradition. No spiritual needs were voiced during this encounter. He was receptive to my visit. I offered blessing for his recovery.      For additional spiritual care, please contact the  on-call at [phone]).    Brianne Figueroa MDiv, MS, [facility]  Staff

## 2024-01-15 NOTE — PROGRESS NOTES
Progress note  Cody Brown MD  Date of service:01/15/24        Transferred out of the ICU on 1/13/2020    History of Presenting Illness:   Per H&P: Cale Mohan is a 73 y.o. male with a history of HTN who presented to ED with complains of right sided weakness and aphasia. LKW at 1800. Patient attempted to get up to the bathroom sometime between 2400-0137 and she noticed that the patient's right arm was shaking and that he could not speak . EMS called and Code S called. Initial NIHSS 19. CT done, no hemorrhage and L ICA occlusion noted on CTA head and neck. Also non specific gas pockets in the right upper neck soft tissues noted. Tele neuro evaluated and deemed patient a candidate for TNK. Patient not on any anticoagulation. TNK was given at 1930. NIS was also notified. Patient was taken to angio for emergent mechanical thrombectomy. Intubated for procedure and then extubated and transferred to ICU post procedure for continued management.\"           Subjective/interval history   Patient transferred out of the ICU on 1/13 but still boarding in the ICU.  Eating breakfast this AM,no complaints.      Assessment and plan   Left MCA territory ischemic stroke with occlusion of M2.  MRI findings suggestive of a cardio embolic [phenomenon   - S/P TNK and mechanical thrombectomy of left MCA on 1/11  - Repeat imaging with no evidence of hemorrhage  - SBP > 160  - ASA/statin  - Neurochecks  - PT/OT following  -Dysphagia evaluation completed, on soft and bite sized diet  - TTE unremarkable, normal EF, no ASD  -PT/OT evaluated, recommending SNF.  -Asking cardiology for setting up cardiac monitor and outpatient follow     Incidental finding of gas in right face/neck:    -nonspecific  -nothing palpable on exam, no tenderness or pain.    -follow clinically  - Repeat imaging if patient develops fever  -Serial exam    HTN  - Started on losartan this admission  - SBP > 160    BPH  - Continue finasteride,

## 2024-01-15 NOTE — PLAN OF CARE
Problem: Occupational Therapy - Adult  Goal: By Discharge: Performs self-care activities at highest level of function for planned discharge setting.  See evaluation for individualized goals.  Description: FUNCTIONAL STATUS PRIOR TO ADMISSION:  At time of evaluation pt unable to provide PLOF. Per chart and report from RN, patient was ambulatory using a rollator for functional mobility PTA.        HOME SUPPORT: Per chart, patient lived with his partner and was independent with ADLs.     Occupational Therapy Goals  Initiated 1/13/2024   1.  Patient will perform seated grooming routine with Set-up within 7 day(s).  2.  Patient will perform anterior neck to thigh bathing, in unsupported sitting, with Minimal Assist within 7 day(s).  3.  Patient will perform upper body dressing with Minimal Assist within 7 day(s).  4.  Patient will perform lower body dressing with Moderate Assist within 7 day(s).   5.  Patient will perform toilet transfers with Minimal Assist within 7 day(s).  6.  Patient will perform all aspects of toileting with Moderate Assist within 7 day(s).  7.  Patient will participate in upper extremity therapeutic exercise/activities with Supervision within 7 day(s).    8.  Patient will improve their Fugl Horner score by 5 points in prep for ADLs within 7 days.]  Outcome: Progressing   OCCUPATIONAL THERAPY TREATMENT  Patient: Cale Mohan (73 y.o. male)  Date: 1/15/2024  Primary Diagnosis: Acute cerebrovascular accident (CVA) ([facility]) [I63.9]       Precautions: Fall Risk                Chart, occupational therapy assessment, plan of care, and goals were reviewed.    ASSESSMENT  Patient continues to benefit from skilled OT services and is progressing towards goals. Patient with improved tolerance for mobility and activity with therapy today. Remains pleasantly confused but very cooperative and motivated to mobilize. He demonstrates improvements in sitting balance with overall ability to complete bimanual tasks with  SBA for balance and Min A with VC for sequencing and persisting in tasks. Equal use of BUEs with prompting today improved from previous session. Continues to demonstrate impaired standing static and more so with dynamic balance challenges, tends to drag RLE with steps and demonstrates persistent posterior lean. Patient is still far from his PLOF though improved tolerance for therapy today and may be more appropriate for IPR placement now at discharge.         PLAN :  Patient continues to benefit from skilled intervention to address the above impairments.  Continue treatment per established plan of care to address goals.    Recommend with staff: OOB to recliner as tolerated. OOB to BSC/bathroom with assist x2 and oniel guzman for safety     Recommend next OT session: Progress mobility/transfers    Recommendation for discharge: (in order for the patient to meet his/her long term goals): Therapy 3 hours/day 5-7 days/week    Other factors to consider for discharge: patient's current support system is unable to meet their requirements for physical assistance, poor safety awareness, impaired cognition, high risk for falls, not safe to be alone, and concern for safely navigating or managing the home environment    IF patient discharges home will need the following DME: rolling walker and continuing to assess with progress       SUBJECTIVE:   Patient stated “It wasn't;t that hard.” re: walking    OBJECTIVE DATA SUMMARY:   Cognitive/Behavioral Status:  Orientation  Overall Orientation Status: Impaired  Orientation Level: Oriented to person;Disoriented to place;Disoriented to time;Disoriented to situation  Cognition  Overall Cognitive Status: Exceptions  Arousal/Alertness: Appropriate responses to stimuli  Following Commands: Follows one step commands with increased time;Follows one step commands with repetition  Attention Span: Attends with cues to redirect  Memory: Decreased recall of biographical Information;Decreased recall

## 2024-01-15 NOTE — CARE COORDINATION
Transition of Care Plan:    RUR: 13% Low   Prior Level of Functioning: Independent with ADL's  Disposition: SNF for Rehab   SNF : Date FOC offered: 1/15/24  Date FOC received:   Transportation at discharge: Family vs BLS  IM/IMM Medicare letter given: 1/12/24  Is patient a [place] and connected with VA? No  Caregiver Contact: Friend Magalis Cotter   Discharge Caregiver contacted prior to discharge? Yes  Care Conference needed? No  Barriers to discharge:    Medical stability  Received consult for  SNF for rehab. CM spoke with patient and his contact Magalisanaya Cotter to discuss transitions of care. Per Magalis she will be visiting patient this evening. CM left the list of facilities. Will follow up for choice.   Brandi Calderón RN Care Management

## 2024-01-15 NOTE — CONSULTS
[facility] CARDIOLOGY    OLU Addendum    I have seen, interviewed, and examined the patient.  I agree with the history, exam, and was involved in the formulation of the assessment and plan as detailed in the Cardiology/Cardiac Electrophysiology consultation documentation composed today by the Advance Practice Provider (OLU, NP, PA). Please see the OLU’s note for full details, below includes any additional revisions. I have performed the exam and medical decision making portions in its entirety.     Physical exam:  Visit Vitals  /73   Pulse 84   Temp 97.4 °F (36.3 °C) (Oral)   Resp 22   Ht 1.829 m (6')   Wt 85.6 kg (188 lb 11.4 oz)   SpO2 95%   BMI 25.84 kg/m²         GENERAL: No acute distress  HEAD: AT/NC  HEENT: Sclerae anicteric, ocular muscles intact.  CARDIOVASCULAR: Regular rate and rhythm, normal S1/S2, no murmurs/rubs/gallops  RESPIRATORY: clear to auscultation bilaterally, without wheezes/rales/rhonchi  ABDOMINAL: soft, non-tender, non-distended, positive bowel sounds  EXTREMITIES: warm, well perfused, no lower extremities edema, no cyanosis.   NEURO: alert, oriented, answering questions appropriately  PSY: normal mood    Data: Labs, ECG, telemetry personally reviewed and interpreted    Current active problems:   Cryptogenic CVA  HTN      Assessment and Plan:   Cardiac electrophysiology is consulted regarding the management of cryptogenic CVA.  PMH significant for HTN who presented to the ED with right sided weakness and aphasia. CT of the head showed no acute process and he received TNK. CTP showed evidence of perfusion mismatch in the left occipital lobe w/ concern for possible PCA occlusion.  CTAh-n showed occluded distal ICA w/ fetal-type left PCA. He then underwent cerebral angiogram w/ mechanical thrombectomy with Dr. Aly. We were consulted to evaluate for possible ILR implant to monitor for cardio embolic cause.   - Echo 1/11/24 without potential source for embolus- EF 55-60%, no ASD.   APRN - NP, 81 mg at 01/15/24 0823    sodium chloride flush 0.9 % injection 5-40 mL, 5-40 mL, IntraVENous, PRN, Chiquita Alatorre MD    0.9 % sodium chloride infusion, , IntraVENous, PRN, Chiquita Alatorre MD    dextrose bolus 10% 125 mL, 125 mL, IntraVENous, PRN, Chiquita Alatorre MD    sodium chloride flush 0.9 % injection 5-40 mL, 5-40 mL, IntraVENous, PRN, Yamil Rhodes NP-CANDI    0.9 % sodium chloride infusion, , IntraVENous, PRN, Yamil Rhodes NP-C    albuterol (PROVENTIL) (2.5 MG/3ML) 0.083% nebulizer solution 2.5 mg, 2.5 mg, Nebulization, Q6H PRN, Yamil Rhodes NP-C    ondansetron (ZOFRAN-ODT) disintegrating tablet 4 mg, 4 mg, Oral, Q8H PRN **OR** ondansetron (ZOFRAN) injection 4 mg, 4 mg, IntraVENous, Q6H PRN, Yamil Rhodes NP-C    senna (SENOKOT) 8.8 MG/5ML syrup 8.8 mg, 5 mL, Oral, BID PRN, Yamil Rhodes NP-C    acetaminophen (TYLENOL) tablet 650 mg, 650 mg, Oral, Q6H PRN **OR** acetaminophen (TYLENOL) suppository 650 mg, 650 mg, Rectal, Q6H PRN, Yamil Rhodes NP-C Brittany Ash, APRN - CNP    [facility] Cardiology  Call center: (P) [phone]  (F) [phone]      CC:No primary care provider on file.

## 2024-01-15 NOTE — PROGRESS NOTES
1530 Bedside and Verbal shift change report given to REGIS Hernandes (oncoming nurse) by REGIS Lopez (offgoing nurse). Report included the following information Nurse Handoff Report, Index, ED Encounter Summary, Adult Overview, Intake/Output, MAR, Recent Results, Med Rec Status, Cardiac Rhythm Sinus rhythm, Alarm Parameters, and Neuro Assessment.     1930 Bedside and Verbal shift change report given to REGIS Carvajal (oncoming nurse) by REGIS Hernandes (offgoing nurse). Report included the following information Nurse Handoff Report, Index, ED Encounter Summary, Adult Overview, Intake/Output, MAR, Recent Results, Med Rec Status, Cardiac Rhythm Sinus Rhythm, Alarm Parameters, and Neuro Assessment.

## 2024-01-16 LAB
ANION GAP SERPL CALC-SCNC: 8 MMOL/L (ref 5–15)
BASOPHILS # BLD: 0 K/UL (ref 0–0.1)
BASOPHILS NFR BLD: 0 % (ref 0–1)
BUN SERPL-MCNC: 14 MG/DL (ref 6–20)
BUN/CREAT SERPL: 12 (ref 12–20)
CALCIUM SERPL-MCNC: 8.8 MG/DL (ref 8.5–10.1)
CHLORIDE SERPL-SCNC: 111 MMOL/L (ref 97–108)
CO2 SERPL-SCNC: 23 MMOL/L (ref 21–32)
CREAT SERPL-MCNC: 1.2 MG/DL (ref 0.7–1.3)
DIFFERENTIAL METHOD BLD: ABNORMAL
EOSINOPHIL # BLD: 0.2 K/UL (ref 0–0.4)
EOSINOPHIL NFR BLD: 3 % (ref 0–7)
ERYTHROCYTE [DISTWIDTH] IN BLOOD BY AUTOMATED COUNT: 12.5 % (ref 11.5–14.5)
GLUCOSE SERPL-MCNC: 104 MG/DL (ref 65–100)
HCT VFR BLD AUTO: 44.3 % (ref 36.6–50.3)
HGB BLD-MCNC: 14.9 G/DL (ref 12.1–17)
IMM GRANULOCYTES # BLD AUTO: 0 K/UL (ref 0–0.04)
IMM GRANULOCYTES NFR BLD AUTO: 1 % (ref 0–0.5)
LYMPHOCYTES # BLD: 1.7 K/UL (ref 0.8–3.5)
LYMPHOCYTES NFR BLD: 23 % (ref 12–49)
MAGNESIUM SERPL-MCNC: 2.2 MG/DL (ref 1.6–2.4)
MCH RBC QN AUTO: 30.3 PG (ref 26–34)
MCHC RBC AUTO-ENTMCNC: 33.6 G/DL (ref 30–36.5)
MCV RBC AUTO: 90 FL (ref 80–99)
MONOCYTES # BLD: 0.7 K/UL (ref 0–1)
MONOCYTES NFR BLD: 10 % (ref 5–13)
NEUTS SEG # BLD: 4.4 K/UL (ref 1.8–8)
NEUTS SEG NFR BLD: 63 % (ref 32–75)
NRBC # BLD: 0 K/UL (ref 0–0.01)
NRBC BLD-RTO: 0 PER 100 WBC
PHOSPHATE SERPL-MCNC: 3.3 MG/DL (ref 2.6–4.7)
PLATELET # BLD AUTO: 205 K/UL (ref 150–400)
PMV BLD AUTO: 9.9 FL (ref 8.9–12.9)
POTASSIUM SERPL-SCNC: 4 MMOL/L (ref 3.5–5.1)
RBC # BLD AUTO: 4.92 M/UL (ref 4.1–5.7)
SODIUM SERPL-SCNC: 142 MMOL/L (ref 136–145)
WBC # BLD AUTO: 7.1 K/UL (ref 4.1–11.1)

## 2024-01-16 PROCEDURE — 84100 ASSAY OF PHOSPHORUS: CPT

## 2024-01-16 PROCEDURE — 97535 SELF CARE MNGMENT TRAINING: CPT

## 2024-01-16 PROCEDURE — 97112 NEUROMUSCULAR REEDUCATION: CPT

## 2024-01-16 PROCEDURE — 85025 COMPLETE CBC W/AUTO DIFF WBC: CPT

## 2024-01-16 PROCEDURE — 6370000000 HC RX 637 (ALT 250 FOR IP): Performed by: INTERNAL MEDICINE

## 2024-01-16 PROCEDURE — 2060000000 HC ICU INTERMEDIATE R&B

## 2024-01-16 PROCEDURE — 6370000000 HC RX 637 (ALT 250 FOR IP): Performed by: NURSE PRACTITIONER

## 2024-01-16 PROCEDURE — 36415 COLL VENOUS BLD VENIPUNCTURE: CPT

## 2024-01-16 PROCEDURE — 6370000000 HC RX 637 (ALT 250 FOR IP)

## 2024-01-16 PROCEDURE — 97530 THERAPEUTIC ACTIVITIES: CPT

## 2024-01-16 PROCEDURE — 80048 BASIC METABOLIC PNL TOTAL CA: CPT

## 2024-01-16 PROCEDURE — 83735 ASSAY OF MAGNESIUM: CPT

## 2024-01-16 RX ADMIN — ASPIRIN 81 MG CHEWABLE TABLET 81 MG: 81 TABLET CHEWABLE at 08:12

## 2024-01-16 RX ADMIN — FINASTERIDE 5 MG: 5 TABLET, FILM COATED ORAL at 08:12

## 2024-01-16 RX ADMIN — ATORVASTATIN CALCIUM 40 MG: 40 TABLET, FILM COATED ORAL at 20:26

## 2024-01-16 RX ADMIN — TAMSULOSIN HYDROCHLORIDE 0.4 MG: 0.4 CAPSULE ORAL at 20:26

## 2024-01-16 RX ADMIN — LOSARTAN POTASSIUM 25 MG: 25 TABLET, FILM COATED ORAL at 08:12

## 2024-01-16 ASSESSMENT — PAIN SCALES - WONG BAKER
WONGBAKER_NUMERICALRESPONSE: 0

## 2024-01-16 ASSESSMENT — PAIN SCALES - GENERAL
PAINLEVEL_OUTOF10: 0

## 2024-01-16 NOTE — PROGRESS NOTES
SLP Contact Note    Discussed pt with RN. Pt tolerating soft/bite sized diet and thin liquids without any overt difficulty nor overt s/s of aspiration. Per discussion with RN, soft and bite sized remains the most appropriate diet and likely his baseline. Therefore, no further SLP needs anticipated. Will sign off. Please reconsult should SLP be of any assistance.    Nanda Mandujano M.Ed, PhD(c), CCC-SLP  Speech-Language Pathologist

## 2024-01-16 NOTE — PROGRESS NOTES
All other components within normal limits   BASIC METABOLIC PANEL - Abnormal; Notable for the following components:    Chloride 111 (*)     Glucose 126 (*)     Creatinine 1.35 (*)     Est, Glom Filt Rate 55 (*)     All other components within normal limits   LIPID PANEL - Abnormal; Notable for the following components:    LDL Calculated 110.6 (*)     All other components within normal limits   BASIC METABOLIC PANEL W/ REFLEX TO MG FOR LOW K - Abnormal; Notable for the following components:    Chloride 112 (*)     Calcium 8.2 (*)     All other components within normal limits   CBC WITH AUTO DIFFERENTIAL - Abnormal; Notable for the following components:    Monocytes % 14 (*)     Monocytes Absolute 1.1 (*)     All other components within normal limits   BASIC METABOLIC PANEL W/ REFLEX TO MG FOR LOW K - Abnormal; Notable for the following components:    Chloride 110 (*)     Bun/Cre Ratio 11 (*)     All other components within normal limits   CBC WITH AUTO DIFFERENTIAL - Abnormal; Notable for the following components:    Immature Granulocytes 1 (*)     All other components within normal limits   BASIC METABOLIC PANEL W/ REFLEX TO MG FOR LOW K - Abnormal; Notable for the following components:    Chloride 112 (*)     Anion Gap 4 (*)     Bun/Cre Ratio 11 (*)     All other components within normal limits   BASIC METABOLIC PANEL W/ REFLEX TO MG FOR LOW K - Abnormal; Notable for the following components:    Chloride 111 (*)     Glucose 104 (*)     All other components within normal limits   CBC WITH AUTO DIFFERENTIAL - Abnormal; Notable for the following components:    Immature Granulocytes 1 (*)     All other components within normal limits       [unfilled]    IMAGING:   MRI BRAIN WO CONTRAST   Final Result   Addendum (preliminary) 1 of 1   Addendum: Addendum: (Initial report by Dr. Cuellar)      In review of the images, there is shown to be several foci of restricted   diffusion consistent with acute infarction, located as  disease. Intracranial atherosclerosis.      IR MECHANICAL ART THROMBECTOMY INTRACRANIAL    (Results Pending)        ECG/ECHO:  [unfilled]       Notes reviewed from all clinical/nonclinical/nursing services involved in patient's clinical care. Care coordination discussions were held with appropriate clinical/nonclinical/ nursing providers based on care coordination needs.       Signed By: Cody Brown MD     January 16, 2024         Please note that this dictation may have been completed with Dragon, the computer voice recognition software.  Quite often unanticipated grammatical, syntax, homophones, and other interpretive errors are inadvertently transcribed by the computer software.  Please disregard these errors.  Please excuse any errors that have escaped final proofreading.

## 2024-01-16 NOTE — CARE COORDINATION
Transition of Care Plan:     RUR: 13% Low   Prior Level of Functioning: Independent with ADL's  Disposition: IPR for Rehab   SNF : Date FOC offered: 1/15/24  Date FOC received:   Transportation at discharge: Family vs BLS  IM/IMM Medicare letter given: 1/12/24  Is patient a  and connected with VA? No  Caregiver Contact: Friend Magalis Cotter   Discharge Caregiver contacted prior to discharge? Yes  Care Conference needed? No  Barriers to discharge:    Medical stability  Therapy is recommending IPR for patient. CM contacted attending through PS and he is agreeable to IPR. Care manager spoke with Magalis Cotter via phone to discuss transitions of care. Provided her with the list of IPR and she has chosen [facility], referral made through [facility].Will need insurance auth   Brandi Calderón RN,Care Management

## 2024-01-16 NOTE — PLAN OF CARE
Problem: Physical Therapy - Adult  Goal: By Discharge: Performs mobility at highest level of function for planned discharge setting.  See evaluation for individualized goals.  Description: FUNCTIONAL STATUS PRIOR TO ADMISSION: patient ambulating with rollator    HOME SUPPORT PRIOR TO ADMISSION: patient lived with girlfriend who assisted with adls/iadls    Physical Therapy Goals  Initiated 1/13/2024  1.  Patient will move from supine to sit and sit to supine in bed with contact guard assist within 7 day(s).    2.  Patient will perform sit to stand with contact guard assist within 7 day(s).  3.  Patient will transfer from bed to chair and chair to bed with contact guard assist using the least restrictive device within 7 day(s).  4.  Patient will ambulate with contact guard assist for 50 feet with the least restrictive device within 7 day(s).   5.  Patient will improve Wild Balance score by 7 points within 7 days.   Outcome: Progressing   PHYSICAL THERAPY TREATMENT    Patient: Cale Mohan (73 y.o. male)  Date: 1/16/2024  Diagnosis: Acute cerebrovascular accident (CVA) ([facility]) [I63.9] Acute cerebrovascular accident (CVA) ([facility])  Procedure(s) (LRB):  Loop recorder insert (N/A) 1 Day Post-Op  Precautions: Fall Risk                    ASSESSMENT:  Patient continues to benefit from skilled PT services and is progressing towards goals. Pt tolerated session well, but continues to be limited by R sided weakness, impaired seated and standing balance, impaired gait, impaired cognition, decreased functional mobility, increased risk for falls and dependency. Pt completed transfers with mod A x 2 with RW and noted strong posterior and R leaning with max facilitation cues with poor improvement.  Practiced sit<>stands with oniel guzman and required less assistance and noted improvement in upright neutral posture with less facilitation. Pt remains well below  baseline and will benefit from IPR upon discharge..         PLAN:  Patient

## 2024-01-16 NOTE — PROGRESS NOTES
Physician Progress Note      PATIENT:               BREANA HAIDER  CSN #:                  [id number]  :                       1950  ADMIT DATE:       2024 7:10 PM  DISCH DATE:  RESPONDING  PROVIDER #:        Cody Brown MD          QUERY TEXT:    The pt presented with right sided weakness and was found to have an acute left   MCA territory ischemic stroke, per Hospitalist notes. The pt received TNK and   underwent a mechanical thrombectomy. Please document if any of the following   were being evaluated and/or treated.    The medical record reflects the following:    Risk Factors: 73-year-old male with a history of HTN presented with right   sided weakness and found to have had a stroke  Clinical Indicators:  -- Hospitalist documented: \"presented to ED with complains of right sided   weakness and aphasia\" and \"Left MCA territory ischemic stroke with occlusion   of M2. MRI findings suggestive of a cardio embolic phenomenon - S/P TNK and   mechanical thrombectomy of left MCA on \"  -- Neurology documented: \"Exam - right sided incoordination/weakness, left   gaze pref, difficulty with complex commands.\" and \"presented to [facility] ED on   24 via EMS for right-side weakness and inability to speak\"  -- Neurointerventional Surgery: \"Patient presented with right sided weakness,   aphasia, and gaze deviation.\"  Treatment: TNK, mechanical thrombectomy of left MCA, Neurology consult,   Neurointerventional Surgery consult, ASA, atorvastatin    Thank-you,  Kevin Onofre RN, CCDS, CRCR  Clinical   [email or web address]  [phone]  You can also contact me via [employer].  Options provided:  -- Right sided weakness due to acute ischemic stroke  -- Other - I will add my own diagnosis  -- Disagree - Not applicable / Not valid  -- Disagree - Clinically unable to determine / Unknown  -- Refer to Clinical Documentation Reviewer    PROVIDER RESPONSE TEXT:    This patient has right

## 2024-01-16 NOTE — PLAN OF CARE
Problem: Occupational Therapy - Adult  Goal: By Discharge: Performs self-care activities at highest level of function for planned discharge setting.  See evaluation for individualized goals.  Description: FUNCTIONAL STATUS PRIOR TO ADMISSION:  At time of evaluation pt unable to provide PLOF. Per chart and report from RN, patient was ambulatory using a rollator for functional mobility PTA.        HOME SUPPORT: Per chart, patient lived with his partner and was independent with ADLs.     Occupational Therapy Goals  Initiated 1/13/2024   1.  Patient will perform seated grooming routine with Set-up within 7 day(s).  2.  Patient will perform anterior neck to thigh bathing, in unsupported sitting, with Minimal Assist within 7 day(s).  3.  Patient will perform upper body dressing with Minimal Assist within 7 day(s).  4.  Patient will perform lower body dressing with Moderate Assist within 7 day(s).   5.  Patient will perform toilet transfers with Minimal Assist within 7 day(s).  6.  Patient will perform all aspects of toileting with Moderate Assist within 7 day(s).  7.  Patient will participate in upper extremity therapeutic exercise/activities with Supervision within 7 day(s).    8.  Patient will improve their Fugl Horner score by 5 points in prep for ADLs within 7 days.]  Outcome: Progressing   OCCUPATIONAL THERAPY TREATMENT  Patient: Cale Mohan (73 y.o. male)  Date: 1/16/2024  Primary Diagnosis: Acute cerebrovascular accident (CVA) ([facility]) [I63.9]  Procedure(s) (LRB):  Loop recorder insert (N/A) 1 Day Post-Op   Precautions: Fall Risk                Chart, occupational therapy assessment, plan of care, and goals were reviewed.    ASSESSMENT  Patient continues to benefit from skilled OT services and is progressing towards goals. Patient continues with steady progress. Focus of session on improving standing tolerance, posturing, and dual task balance challenges in [place] Me[place]. Patient initially requiring increased assist

## 2024-01-17 LAB
ANION GAP SERPL CALC-SCNC: 7 MMOL/L (ref 5–15)
BACTERIA SPEC CULT: NORMAL
BACTERIA SPEC CULT: NORMAL
BASOPHILS # BLD: 0 K/UL (ref 0–0.1)
BASOPHILS NFR BLD: 0 % (ref 0–1)
BUN SERPL-MCNC: 14 MG/DL (ref 6–20)
BUN/CREAT SERPL: 13 (ref 12–20)
CALCIUM SERPL-MCNC: 8.5 MG/DL (ref 8.5–10.1)
CHLORIDE SERPL-SCNC: 110 MMOL/L (ref 97–108)
CO2 SERPL-SCNC: 23 MMOL/L (ref 21–32)
CREAT SERPL-MCNC: 1.11 MG/DL (ref 0.7–1.3)
DIFFERENTIAL METHOD BLD: ABNORMAL
EOSINOPHIL # BLD: 0.3 K/UL (ref 0–0.4)
EOSINOPHIL NFR BLD: 3 % (ref 0–7)
ERYTHROCYTE [DISTWIDTH] IN BLOOD BY AUTOMATED COUNT: 12.4 % (ref 11.5–14.5)
GLUCOSE SERPL-MCNC: 93 MG/DL (ref 65–100)
HCT VFR BLD AUTO: 47 % (ref 36.6–50.3)
HGB BLD-MCNC: 15.3 G/DL (ref 12.1–17)
IMM GRANULOCYTES # BLD AUTO: 0 K/UL (ref 0–0.04)
IMM GRANULOCYTES NFR BLD AUTO: 1 % (ref 0–0.5)
LYMPHOCYTES # BLD: 1.9 K/UL (ref 0.8–3.5)
LYMPHOCYTES NFR BLD: 24 % (ref 12–49)
MAGNESIUM SERPL-MCNC: 2.3 MG/DL (ref 1.6–2.4)
MCH RBC QN AUTO: 30.1 PG (ref 26–34)
MCHC RBC AUTO-ENTMCNC: 32.6 G/DL (ref 30–36.5)
MCV RBC AUTO: 92.5 FL (ref 80–99)
MONOCYTES # BLD: 0.9 K/UL (ref 0–1)
MONOCYTES NFR BLD: 11 % (ref 5–13)
NEUTS SEG # BLD: 4.8 K/UL (ref 1.8–8)
NEUTS SEG NFR BLD: 61 % (ref 32–75)
NRBC # BLD: 0 K/UL (ref 0–0.01)
NRBC BLD-RTO: 0 PER 100 WBC
PHOSPHATE SERPL-MCNC: 3.3 MG/DL (ref 2.6–4.7)
PLATELET # BLD AUTO: 193 K/UL (ref 150–400)
PMV BLD AUTO: 10.1 FL (ref 8.9–12.9)
POTASSIUM SERPL-SCNC: 3.9 MMOL/L (ref 3.5–5.1)
RBC # BLD AUTO: 5.08 M/UL (ref 4.1–5.7)
SERVICE CMNT-IMP: NORMAL
SERVICE CMNT-IMP: NORMAL
SODIUM SERPL-SCNC: 140 MMOL/L (ref 136–145)
WBC # BLD AUTO: 7.8 K/UL (ref 4.1–11.1)

## 2024-01-17 PROCEDURE — 97112 NEUROMUSCULAR REEDUCATION: CPT | Performed by: OCCUPATIONAL THERAPIST

## 2024-01-17 PROCEDURE — 2060000000 HC ICU INTERMEDIATE R&B

## 2024-01-17 PROCEDURE — 6370000000 HC RX 637 (ALT 250 FOR IP): Performed by: INTERNAL MEDICINE

## 2024-01-17 PROCEDURE — 83735 ASSAY OF MAGNESIUM: CPT

## 2024-01-17 PROCEDURE — 84100 ASSAY OF PHOSPHORUS: CPT

## 2024-01-17 PROCEDURE — 36415 COLL VENOUS BLD VENIPUNCTURE: CPT

## 2024-01-17 PROCEDURE — 97116 GAIT TRAINING THERAPY: CPT

## 2024-01-17 PROCEDURE — 97530 THERAPEUTIC ACTIVITIES: CPT

## 2024-01-17 PROCEDURE — 80048 BASIC METABOLIC PNL TOTAL CA: CPT

## 2024-01-17 PROCEDURE — 85025 COMPLETE CBC W/AUTO DIFF WBC: CPT

## 2024-01-17 PROCEDURE — 6370000000 HC RX 637 (ALT 250 FOR IP)

## 2024-01-17 PROCEDURE — 97535 SELF CARE MNGMENT TRAINING: CPT | Performed by: OCCUPATIONAL THERAPIST

## 2024-01-17 PROCEDURE — 6370000000 HC RX 637 (ALT 250 FOR IP): Performed by: NURSE PRACTITIONER

## 2024-01-17 RX ADMIN — TAMSULOSIN HYDROCHLORIDE 0.4 MG: 0.4 CAPSULE ORAL at 20:43

## 2024-01-17 RX ADMIN — ATORVASTATIN CALCIUM 40 MG: 40 TABLET, FILM COATED ORAL at 20:43

## 2024-01-17 RX ADMIN — FINASTERIDE 5 MG: 5 TABLET, FILM COATED ORAL at 08:16

## 2024-01-17 RX ADMIN — LOSARTAN POTASSIUM 25 MG: 25 TABLET, FILM COATED ORAL at 08:49

## 2024-01-17 RX ADMIN — ASPIRIN 81 MG CHEWABLE TABLET 81 MG: 81 TABLET CHEWABLE at 08:16

## 2024-01-17 ASSESSMENT — PAIN SCALES - GENERAL
PAINLEVEL_OUTOF10: 0

## 2024-01-17 ASSESSMENT — PAIN SCALES - WONG BAKER
WONGBAKER_NUMERICALRESPONSE: 0

## 2024-01-17 NOTE — PLAN OF CARE
Problem: Physical Therapy - Adult  Goal: By Discharge: Performs mobility at highest level of function for planned discharge setting.  See evaluation for individualized goals.  Description: FUNCTIONAL STATUS PRIOR TO ADMISSION: patient ambulating with rollator    HOME SUPPORT PRIOR TO ADMISSION: patient lived with girlfriend who assisted with adls/iadls    Physical Therapy Goals  Initiated 1/13/2024  1.  Patient will move from supine to sit and sit to supine in bed with contact guard assist within 7 day(s).    2.  Patient will perform sit to stand with contact guard assist within 7 day(s).  3.  Patient will transfer from bed to chair and chair to bed with contact guard assist using the least restrictive device within 7 day(s).  4.  Patient will ambulate with contact guard assist for 50 feet with the least restrictive device within 7 day(s).   5.  Patient will improve Wild Balance score by 7 points within 7 days.   Outcome: Progressing   PHYSICAL THERAPY TREATMENT    Patient: Cale Mohan (73 y.o. male)  Date: 1/17/2024  Diagnosis: Acute cerebrovascular accident (CVA) ([facility]) [I63.9] Acute cerebrovascular accident (CVA) ([facility])  Procedure(s) (LRB):  Loop recorder insert (N/A) 2 Days Post-Op  Precautions: Fall Risk                      ASSESSMENT:  Patient continues to benefit from skilled PT services and is progressing towards goals. Pt tolerated session well, but continues to be limited by R sided weakness, decreased functional mobility, impaired standing balance, gait, increased risk for falls and dependency. Pt demonstrated improvement in motor initiation to assume sitting EOB, but still required mod A x 1-2. Pt performed sit<>stands with RW with mod A x 2 and cueing for proper hand placement and safe eccentric control lowering self into the chair. Pt able to progress gait x 6 ft with RW with mod A x 2 and assist to maneuver RW and cueing for RLE stepping. Continue to recommed IPR upon discharge.

## 2024-01-17 NOTE — CARE COORDINATION
Transition of Care Plan:    RUR: 13%  Prior Level of Functioning: Independent   Disposition: Rehab    IPR: Date FOC offered: 1/16/24  Date FOC received: 1/16/24  Accepting facility:   Date authorization started with reference number:   Date authorization received and expires:   [facility]   /IMM Medicare 1/12/24    Is patient a  and connected with VA? No  Caregiver Contact: Magalis Cotter   Discharge Caregiver contacted prior to discharge? Yes   Care Conference needed? No   Barriers to discharge:    Received notification [facility] unable to accept patient's insurance.   Additional referrals made to [facility].   Brandi Calderón RN,Care Management

## 2024-01-17 NOTE — PLAN OF CARE
Problem: Occupational Therapy - Adult  Goal: By Discharge: Performs self-care activities at highest level of function for planned discharge setting.  See evaluation for individualized goals.  Description: FUNCTIONAL STATUS PRIOR TO ADMISSION:  At time of evaluation pt unable to provide PLOF. Per chart and report from RN, patient was ambulatory using a rollator for functional mobility PTA.        HOME SUPPORT: Per chart, patient lived with his partner and was independent with ADLs.     Occupational Therapy Goals  Initiated 1/13/2024   1.  Patient will perform seated grooming routine with Set-up within 7 day(s).  2.  Patient will perform anterior neck to thigh bathing, in unsupported sitting, with Minimal Assist within 7 day(s).  3.  Patient will perform upper body dressing with Minimal Assist within 7 day(s).  4.  Patient will perform lower body dressing with Moderate Assist within 7 day(s).   5.  Patient will perform toilet transfers with Minimal Assist within 7 day(s).  6.  Patient will perform all aspects of toileting with Moderate Assist within 7 day(s).  7.  Patient will participate in upper extremity therapeutic exercise/activities with Supervision within 7 day(s).    8.  Patient will improve their Fugl Horner score by 5 points in prep for ADLs within 7 days.]  Outcome: Progressing     OCCUPATIONAL THERAPY TREATMENT  Patient: Cale Mohan (73 y.o. male)  Date: 1/17/2024  Primary Diagnosis: Acute cerebrovascular accident (CVA) ([facility]) [I63.9]  Procedure(s) (LRB):  Loop recorder insert (N/A) 2 Days Post-Op   Precautions: Fall Risk                Chart, occupational therapy assessment, plan of care, and goals were reviewed.    ASSESSMENT  Patient continues to benefit from skilled OT services and is progressing towards goals. Patient demonstrated improved sitting balance for ADL tasks at edge of bed and in standing to perform vianca-care. Demonstrated improved ADL mobility and able to transfer to chair with rolling  (comment) (instructed to reach back, max cues to carryover)  Stand Pivot Transfers: Moderate assistance;Assist X2;Adaptive equipment;Additional time (with rolling walker)  Bed to Chair: Moderate assistance;Assist X2;Additional time;Adaptive equipment  Toilet Transfer: Moderate assistance;Assist X2;Adaptive equipment;Additional time (stand pivot with rolling walker to bedside commode (infer))           Balance:  Standing: Impaired  Balance  Sitting: Impaired  Sitting - Static: Good (unsupported)  Sitting - Dynamic: Good (unsupported);Fair (occasional)  Standing: Impaired  Standing - Static: Constant support;Fair  Standing - Dynamic: Poor;Constant support  Patient educated on safe transfer techniques, with specific emphasis on proper hand placement to push up from seated surface rather than attempt to pull self up, fully positioning self in-front of desired seated location, feeling chair on back of legs and reaching back with 1-2 UE to slowly lower self to seated position.         ADL Intervention:         Grooming: Supervision;Setup   Grooming Skilled Clinical Factors: seated in chair for oral care, face washing, noted increased cough during oral care and cue to rinse/spit       UE Dressing: Setup;Supervision  UE Dressing Skilled Clinical Factors: seated edge of bed with good balance, cues to doff off shoulders and thread arm    LE Dressing: Maximum assistance  LE Dressing Skilled Clinical Factors: leaned forward to pull sock up seated edge of bed with CGA, totala ssist to thread over toes    Toileting: Maximum assistance  Toileting Skilled Clinical Factors: male wick in place, stood with mod assist of 2 to perform sacral vianca-care in standing    Patient instructed and indicated understanding the benefits of maintaining activity tolerance, functional mobility, and independence with self care tasks during acute stay  to ensure safe return home and to baseline. Encouraged patient to increase frequency and duration OOB,

## 2024-01-17 NOTE — PROGRESS NOTES
Progress note  Cody Brown MD  Date of service:01/17/24        Transferred out of the ICU on 1/13/2020    History of Presenting Illness:   Per H&P: Cale Mohan is a 73 y.o. male with a history of HTN who presented to ED with complains of right sided weakness and aphasia. LKW at 1800. Patient attempted to get up to the bathroom sometime between [id number] and she noticed that the patient's right arm was shaking and that he could not speak . EMS called and Code S called. Initial NIHSS 19. CT done, no hemorrhage and L ICA occlusion noted on CTA head and neck. Also non specific gas pockets in the right upper neck soft tissues noted. Tele neuro evaluated and deemed patient a candidate for TNK. Patient not on any anticoagulation. TNK was given at 1930. NIS was also notified. Patient was taken to angio for emergent mechanical thrombectomy. Intubated for procedure and then extubated and transferred to ICU post procedure for continued management.\"           Subjective/interval history   Patient transferred out of the ICU on 1/13 but still boarding in the ICU.  Patient seen sitting in a chair, on room air, appears comfortable.  No complaints.      Assessment and plan   Left MCA territory ischemic stroke with occlusion of M2.  Right hemiparesis due to the acute stroke  MRI findings suggestive of a cardio embolic [phenomenon   - S/P TNK and mechanical thrombectomy of left MCA on 1/11  - Repeat imaging with no evidence of hemorrhage  - SBP > 160  - ASA/statin  - Neurochecks  - PT/OT following  -Dysphagia evaluation completed, on soft and bite sized diet  - TTE unremarkable, normal EF, no ASD  -PT/OT evaluated, recommending SNF.  -Status post ILR.    Incidental finding of gas in right face/neck:    -nonspecific  -nothing palpable on exam, no tenderness or pain.    -follow clinically  - Repeat imaging if patient develops fever  -Serial exam    HTN  - Started on losartan this admission  - SBP > 160    BPH  - Continue  components within normal limits   BASIC METABOLIC PANEL - Abnormal; Notable for the following components:    Chloride 111 (*)     Glucose 126 (*)     Creatinine 1.35 (*)     Est, Glom Filt Rate 55 (*)     All other components within normal limits   LIPID PANEL - Abnormal; Notable for the following components:    LDL Calculated 110.6 (*)     All other components within normal limits   BASIC METABOLIC PANEL W/ REFLEX TO MG FOR LOW K - Abnormal; Notable for the following components:    Chloride 112 (*)     Calcium 8.2 (*)     All other components within normal limits   CBC WITH AUTO DIFFERENTIAL - Abnormal; Notable for the following components:    Monocytes % 14 (*)     Monocytes Absolute 1.1 (*)     All other components within normal limits   BASIC METABOLIC PANEL W/ REFLEX TO MG FOR LOW K - Abnormal; Notable for the following components:    Chloride 110 (*)     Bun/Cre Ratio 11 (*)     All other components within normal limits   CBC WITH AUTO DIFFERENTIAL - Abnormal; Notable for the following components:    Immature Granulocytes 1 (*)     All other components within normal limits   BASIC METABOLIC PANEL W/ REFLEX TO MG FOR LOW K - Abnormal; Notable for the following components:    Chloride 112 (*)     Anion Gap 4 (*)     Bun/Cre Ratio 11 (*)     All other components within normal limits   BASIC METABOLIC PANEL W/ REFLEX TO MG FOR LOW K - Abnormal; Notable for the following components:    Chloride 111 (*)     Glucose 104 (*)     All other components within normal limits   CBC WITH AUTO DIFFERENTIAL - Abnormal; Notable for the following components:    Immature Granulocytes 1 (*)     All other components within normal limits   BASIC METABOLIC PANEL W/ REFLEX TO MG FOR LOW K - Abnormal; Notable for the following components:    Chloride 110 (*)     All other components within normal limits   CBC WITH AUTO DIFFERENTIAL - Abnormal; Notable for the following components:    Immature Granulocytes 1 (*)     All other components

## 2024-01-18 LAB
ANION GAP SERPL CALC-SCNC: 6 MMOL/L (ref 5–15)
BASOPHILS # BLD: 0 K/UL (ref 0–0.1)
BASOPHILS NFR BLD: 0 % (ref 0–1)
BUN SERPL-MCNC: 14 MG/DL (ref 6–20)
BUN/CREAT SERPL: 13 (ref 12–20)
CALCIUM SERPL-MCNC: 8.5 MG/DL (ref 8.5–10.1)
CHLORIDE SERPL-SCNC: 109 MMOL/L (ref 97–108)
CO2 SERPL-SCNC: 25 MMOL/L (ref 21–32)
CREAT SERPL-MCNC: 1.11 MG/DL (ref 0.7–1.3)
DIFFERENTIAL METHOD BLD: NORMAL
EOSINOPHIL # BLD: 0.2 K/UL (ref 0–0.4)
EOSINOPHIL NFR BLD: 3 % (ref 0–7)
ERYTHROCYTE [DISTWIDTH] IN BLOOD BY AUTOMATED COUNT: 12.4 % (ref 11.5–14.5)
GLUCOSE SERPL-MCNC: 97 MG/DL (ref 65–100)
HCT VFR BLD AUTO: 43.6 % (ref 36.6–50.3)
HGB BLD-MCNC: 14.2 G/DL (ref 12.1–17)
IMM GRANULOCYTES # BLD AUTO: 0 K/UL (ref 0–0.04)
IMM GRANULOCYTES NFR BLD AUTO: 0 % (ref 0–0.5)
LYMPHOCYTES # BLD: 1.6 K/UL (ref 0.8–3.5)
LYMPHOCYTES NFR BLD: 23 % (ref 12–49)
MAGNESIUM SERPL-MCNC: 2.3 MG/DL (ref 1.6–2.4)
MCH RBC QN AUTO: 29.9 PG (ref 26–34)
MCHC RBC AUTO-ENTMCNC: 32.6 G/DL (ref 30–36.5)
MCV RBC AUTO: 91.8 FL (ref 80–99)
MONOCYTES # BLD: 0.8 K/UL (ref 0–1)
MONOCYTES NFR BLD: 11 % (ref 5–13)
NEUTS SEG # BLD: 4.3 K/UL (ref 1.8–8)
NEUTS SEG NFR BLD: 63 % (ref 32–75)
NRBC # BLD: 0 K/UL (ref 0–0.01)
NRBC BLD-RTO: 0 PER 100 WBC
PHOSPHATE SERPL-MCNC: 3.2 MG/DL (ref 2.6–4.7)
PLATELET # BLD AUTO: 210 K/UL (ref 150–400)
PMV BLD AUTO: 9.8 FL (ref 8.9–12.9)
POTASSIUM SERPL-SCNC: 4.1 MMOL/L (ref 3.5–5.1)
RBC # BLD AUTO: 4.75 M/UL (ref 4.1–5.7)
SODIUM SERPL-SCNC: 140 MMOL/L (ref 136–145)
WBC # BLD AUTO: 6.9 K/UL (ref 4.1–11.1)

## 2024-01-18 PROCEDURE — 36415 COLL VENOUS BLD VENIPUNCTURE: CPT

## 2024-01-18 PROCEDURE — 6370000000 HC RX 637 (ALT 250 FOR IP)

## 2024-01-18 PROCEDURE — 97530 THERAPEUTIC ACTIVITIES: CPT

## 2024-01-18 PROCEDURE — 97116 GAIT TRAINING THERAPY: CPT

## 2024-01-18 PROCEDURE — 83735 ASSAY OF MAGNESIUM: CPT

## 2024-01-18 PROCEDURE — 6370000000 HC RX 637 (ALT 250 FOR IP): Performed by: INTERNAL MEDICINE

## 2024-01-18 PROCEDURE — 6370000000 HC RX 637 (ALT 250 FOR IP): Performed by: NURSE PRACTITIONER

## 2024-01-18 PROCEDURE — 97535 SELF CARE MNGMENT TRAINING: CPT | Performed by: OCCUPATIONAL THERAPIST

## 2024-01-18 PROCEDURE — 2060000000 HC ICU INTERMEDIATE R&B

## 2024-01-18 PROCEDURE — 80048 BASIC METABOLIC PNL TOTAL CA: CPT

## 2024-01-18 PROCEDURE — 85025 COMPLETE CBC W/AUTO DIFF WBC: CPT

## 2024-01-18 PROCEDURE — 84100 ASSAY OF PHOSPHORUS: CPT

## 2024-01-18 RX ADMIN — LOSARTAN POTASSIUM 25 MG: 25 TABLET, FILM COATED ORAL at 08:03

## 2024-01-18 RX ADMIN — ASPIRIN 81 MG CHEWABLE TABLET 81 MG: 81 TABLET CHEWABLE at 08:03

## 2024-01-18 RX ADMIN — TAMSULOSIN HYDROCHLORIDE 0.4 MG: 0.4 CAPSULE ORAL at 20:27

## 2024-01-18 RX ADMIN — ATORVASTATIN CALCIUM 40 MG: 40 TABLET, FILM COATED ORAL at 20:26

## 2024-01-18 RX ADMIN — FINASTERIDE 5 MG: 5 TABLET, FILM COATED ORAL at 08:03

## 2024-01-18 ASSESSMENT — PAIN SCALES - GENERAL
PAINLEVEL_OUTOF10: 0

## 2024-01-18 ASSESSMENT — PAIN SCALES - WONG BAKER
WONGBAKER_NUMERICALRESPONSE: 0

## 2024-01-18 NOTE — PLAN OF CARE
Problem: Occupational Therapy - Adult  Goal: By Discharge: Performs self-care activities at highest level of function for planned discharge setting.  See evaluation for individualized goals.  Description: FUNCTIONAL STATUS PRIOR TO ADMISSION:  At time of evaluation pt unable to provide PLOF. Per chart and report from RN, patient was ambulatory using a rollator for functional mobility PTA.        HOME SUPPORT: Per chart, patient lived with his partner and was independent with ADLs.     Occupational Therapy Goals  Initiated 1/13/2024   1.  Patient will perform seated grooming routine with Set-up within 7 day(s).  2.  Patient will perform anterior neck to thigh bathing, in unsupported sitting, with Minimal Assist within 7 day(s).  3.  Patient will perform upper body dressing with Minimal Assist within 7 day(s).  4.  Patient will perform lower body dressing with Moderate Assist within 7 day(s).   5.  Patient will perform toilet transfers with Minimal Assist within 7 day(s).  6.  Patient will perform all aspects of toileting with Moderate Assist within 7 day(s).  7.  Patient will participate in upper extremity therapeutic exercise/activities with Supervision within 7 day(s).    8.  Patient will improve their Fugl Horner score by 5 points in prep for ADLs within 7 days.]  Outcome: Progressing    OCCUPATIONAL THERAPY TREATMENT  Patient: Cale Mohan (73 y.o. male)  Date: 1/18/2024  Primary Diagnosis: Acute cerebrovascular accident (CVA) ([facility]) [I63.9]  Procedure(s) (LRB):  Loop recorder insert (N/A) 3 Days Post-Op   Precautions: Fall Risk                Chart, occupational therapy assessment, plan of care, and goals were reviewed.    ASSESSMENT  Patient continues to benefit from skilled OT services and is progressing towards goals. Patient received in the chair on arrival and pleasant, continues to demonstrate increased weakness in right LE versus UE and participated in bathing with bath wipes, UE dressing and vianca-care.

## 2024-01-18 NOTE — PLAN OF CARE
Problem: Physical Therapy - Adult  Goal: By Discharge: Performs mobility at highest level of function for planned discharge setting.  See evaluation for individualized goals.  Description: FUNCTIONAL STATUS PRIOR TO ADMISSION: patient ambulating with rollator    HOME SUPPORT PRIOR TO ADMISSION: patient lived with girlfriend who assisted with adls/iadls    Physical Therapy Goals  Initiated 1/13/2024  1.  Patient will move from supine to sit and sit to supine in bed with contact guard assist within 7 day(s).    2.  Patient will perform sit to stand with contact guard assist within 7 day(s).  3.  Patient will transfer from bed to chair and chair to bed with contact guard assist using the least restrictive device within 7 day(s).  4.  Patient will ambulate with contact guard assist for 50 feet with the least restrictive device within 7 day(s).   5.  Patient will improve Wild Balance score by 7 points within 7 days.   Outcome: Progressing   PHYSICAL THERAPY TREATMENT    Patient: Cale Mohan (73 y.o. male)  Date: 1/18/2024  Diagnosis: Acute cerebrovascular accident (CVA) ([facility]) [I63.9] Acute cerebrovascular accident (CVA) ([facility])  Procedure(s) (LRB):  Loop recorder insert (N/A) 3 Days Post-Op  Precautions: Fall Risk                      ASSESSMENT:  Patient continues to benefit from skilled PT services and is progressing towards goals. Pt tolerated session fairly well, and continues to be limited by RLE weakness and impaired coordination, decreased functional mobility, impaired balance and gait, rigid posture, increased risk for falls and dependency. Pt practiced forward flexion movements while seated in the chair prior to transfers and noted improvement in ease of transfers. Pt still required mod A x 2 sit<>stand with RW and cueing for hand placement. Pt tolerated gait training with mod A x 2 initially and progressed to max A x 2 with max cueing for foot placement and timing of steps. Pt remains an excellent inpatient

## 2024-01-18 NOTE — CARE COORDINATION
Transition of Care Plan:     RUR: 13%  Prior Level of Functioning: Independent   Disposition: Rehab    IPR: Date FOC offered: 1/16/24  Date FOC received: 1/16/24  Accepting facility:   Date authorization started with reference number:   Date authorization received and expires:   Transportation at discharge: Stretcher   /IMM Medicare 1/12/24    Is patient a [place] and connected with VA? No  Caregiver Contact: Magalis Cotter   Discharge Caregiver contacted prior to discharge? Yes   Care Conference needed? No   Barriers to discharge:    Medical stability  CM received notification from [facility] that they can accept patient. CM requested they start insurance auth.   CM spoke with Magalis Cotter and she is in agreement for [facility].   Brandi Calderón RN, Care Management

## 2024-01-18 NOTE — PROGRESS NOTES
Hospitalist Progress Note  Terrence Rivera MD  Answering service: [phone] OR [phone] from in house phone        Date of Service:  2024  NAME:  Cale Mohan  :  1950  MRN:  [id number]      Admission Summary:   Per H&P: Cale Mohan is a 73 y.o. male with a history of HTN who presented to ED with complains of right sided weakness and aphasia. LKW at 1800. Patient attempted to get up to the bathroom sometime between 9348-3909 and she noticed that the patient's right arm was shaking and that he could not speak . EMS called and Code S called. Initial NIHSS 19. CT done, no hemorrhage and L ICA occlusion noted on CTA head and neck. Also non specific gas pockets in the right upper neck soft tissues noted. Tele neuro evaluated and deemed patient a candidate for TNK. Patient not on any anticoagulation. TNK was given at 1930. NIS was also notified. Patient was taken to angio for emergent mechanical thrombectomy. Intubated for procedure and then extubated and transferred to ICU post procedure for continued management.\"     Interval history / Subjective:   Seen and examined for follow up of CVA s/p thrombectomy. No s/s of distress. Denies any acute complaints.      Assessment & Plan:     Left MCA territory ischemic stroke with occlusion of M2.  Right hemiparesis due to the acute stroke  MRI findings suggestive of a cardio embolic [phenomenon   - S/P TNK and mechanical thrombectomy of left MCA on   - Repeat imaging with no evidence of hemorrhage  - SBP > 160  - ASA/statin  - Neurochecks  - PT/OT following  -Dysphagia evaluation completed, on soft and bite sized diet  - TTE unremarkable, normal EF, no ASD  -PT/OT  -Status post ILR  -pending IPR placement     Incidental finding of gas in right face/neck:    -nonspecific  -nothing palpable on exam, no tenderness or pain.    -follow clinically  - Repeat imaging

## 2024-01-19 LAB
ANION GAP SERPL CALC-SCNC: 7 MMOL/L (ref 5–15)
BASOPHILS # BLD: 0 K/UL (ref 0–0.1)
BASOPHILS NFR BLD: 0 % (ref 0–1)
BUN SERPL-MCNC: 16 MG/DL (ref 6–20)
BUN/CREAT SERPL: 14 (ref 12–20)
CALCIUM SERPL-MCNC: 9.6 MG/DL (ref 8.5–10.1)
CHLORIDE SERPL-SCNC: 110 MMOL/L (ref 97–108)
CO2 SERPL-SCNC: 22 MMOL/L (ref 21–32)
CREAT SERPL-MCNC: 1.15 MG/DL (ref 0.7–1.3)
DIFFERENTIAL METHOD BLD: ABNORMAL
EOSINOPHIL # BLD: 0.2 K/UL (ref 0–0.4)
EOSINOPHIL NFR BLD: 3 % (ref 0–7)
ERYTHROCYTE [DISTWIDTH] IN BLOOD BY AUTOMATED COUNT: 12.5 % (ref 11.5–14.5)
GLUCOSE SERPL-MCNC: 93 MG/DL (ref 65–100)
HCT VFR BLD AUTO: 46.2 % (ref 36.6–50.3)
HGB BLD-MCNC: 15 G/DL (ref 12.1–17)
IMM GRANULOCYTES # BLD AUTO: 0 K/UL (ref 0–0.04)
IMM GRANULOCYTES NFR BLD AUTO: 1 % (ref 0–0.5)
LYMPHOCYTES # BLD: 1.6 K/UL (ref 0.8–3.5)
LYMPHOCYTES NFR BLD: 25 % (ref 12–49)
MCH RBC QN AUTO: 29.9 PG (ref 26–34)
MCHC RBC AUTO-ENTMCNC: 32.5 G/DL (ref 30–36.5)
MCV RBC AUTO: 92 FL (ref 80–99)
MONOCYTES # BLD: 0.8 K/UL (ref 0–1)
MONOCYTES NFR BLD: 12 % (ref 5–13)
NEUTS SEG # BLD: 3.7 K/UL (ref 1.8–8)
NEUTS SEG NFR BLD: 59 % (ref 32–75)
NRBC # BLD: 0 K/UL (ref 0–0.01)
NRBC BLD-RTO: 0 PER 100 WBC
PLATELET # BLD AUTO: 223 K/UL (ref 150–400)
PMV BLD AUTO: 9.8 FL (ref 8.9–12.9)
POTASSIUM SERPL-SCNC: 4.1 MMOL/L (ref 3.5–5.1)
RBC # BLD AUTO: 5.02 M/UL (ref 4.1–5.7)
SODIUM SERPL-SCNC: 139 MMOL/L (ref 136–145)
WBC # BLD AUTO: 6.3 K/UL (ref 4.1–11.1)

## 2024-01-19 PROCEDURE — 36415 COLL VENOUS BLD VENIPUNCTURE: CPT

## 2024-01-19 PROCEDURE — 80048 BASIC METABOLIC PNL TOTAL CA: CPT

## 2024-01-19 PROCEDURE — 97535 SELF CARE MNGMENT TRAINING: CPT

## 2024-01-19 PROCEDURE — 2060000000 HC ICU INTERMEDIATE R&B

## 2024-01-19 PROCEDURE — 6370000000 HC RX 637 (ALT 250 FOR IP)

## 2024-01-19 PROCEDURE — 97530 THERAPEUTIC ACTIVITIES: CPT

## 2024-01-19 PROCEDURE — 85025 COMPLETE CBC W/AUTO DIFF WBC: CPT

## 2024-01-19 PROCEDURE — 6370000000 HC RX 637 (ALT 250 FOR IP): Performed by: INTERNAL MEDICINE

## 2024-01-19 PROCEDURE — 97112 NEUROMUSCULAR REEDUCATION: CPT

## 2024-01-19 PROCEDURE — 97116 GAIT TRAINING THERAPY: CPT

## 2024-01-19 RX ADMIN — FINASTERIDE 5 MG: 5 TABLET, FILM COATED ORAL at 08:29

## 2024-01-19 RX ADMIN — LOSARTAN POTASSIUM 25 MG: 25 TABLET, FILM COATED ORAL at 08:29

## 2024-01-19 RX ADMIN — ASPIRIN 81 MG CHEWABLE TABLET 81 MG: 81 TABLET CHEWABLE at 08:29

## 2024-01-19 ASSESSMENT — PAIN SCALES - GENERAL
PAINLEVEL_OUTOF10: 0
PAINLEVEL_OUTOF10: 0

## 2024-01-19 NOTE — PROGRESS NOTES
Hospitalist Progress Note  Terrence Rivera MD  Answering service: [phone] OR [phone] from in house phone        Date of Service:  2024  NAME:  Cale Mohan  :  1950  MRN:  [id number]      Admission Summary:   Per H&P: Cale Mhoan is a 73 y.o. male with a history of HTN who presented to ED with complains of right sided weakness and aphasia. LKW at 1800. Patient attempted to get up to the bathroom sometime between 9060-4614 and she noticed that the patient's right arm was shaking and that he could not speak . EMS called and Code S called. Initial NIHSS 19. CT done, no hemorrhage and L ICA occlusion noted on CTA head and neck. Also non specific gas pockets in the right upper neck soft tissues noted. Tele neuro evaluated and deemed patient a candidate for TNK. Patient not on any anticoagulation. TNK was given at 1930. NIS was also notified. Patient was taken to angio for emergent mechanical thrombectomy. Intubated for procedure and then extubated and transferred to ICU post procedure for continued management.\"     Interval history / Subjective:   Seen and examined for follow up of CVA s/p thrombectomy. Resting comfortably. No acute complaints.      Assessment & Plan:     Left MCA territory ischemic stroke with occlusion of M2.  Right hemiparesis due to the acute stroke  MRI findings suggestive of a cardio embolic [phenomenon   - S/P TNK and mechanical thrombectomy of left MCA on   - Repeat imaging with no evidence of hemorrhage  - SBP > 160  - ASA/statin  - Neurochecks  - PT/OT following  -Dysphagia evaluation completed, on soft and bite sized diet  - TTE unremarkable, normal EF, no ASD  -PT/OT  -Status post ILR  -pending IPR placement     Incidental finding of gas in right face/neck:    -nonspecific  -nothing palpable on exam, no tenderness or pain.    -follow clinically  - Repeat imaging if

## 2024-01-19 NOTE — PLAN OF CARE
Problem: Discharge Planning  Goal: Discharge to home or other facility with appropriate resources  1/19/2024 1748 by Emi Muniz, RN  Outcome: Progressing  Flowsheets  Taken 1/19/2024 1600 by Emi Muniz RN  Discharge to home or other facility with appropriate resources:   Identify barriers to discharge with patient and caregiver   Arrange for needed discharge resources and transportation as appropriate   Identify discharge learning needs (meds, wound care, etc)   Arrange for interpreters to assist at discharge as needed   Refer to discharge planning if patient needs post-hospital services based on physician order or complex needs related to functional status, cognitive ability or social support system  Taken 1/19/2024 1200 by Aminah Aranda RN  Discharge to home or other facility with appropriate resources: Identify barriers to discharge with patient and caregiver  Taken 1/19/2024 0800 by Haley Cross RN  Discharge to home or other facility with appropriate resources: Identify barriers to discharge with patient and caregiver  1/19/2024 0755 by Haley Cross RN  Outcome: Progressing  Flowsheets (Taken 1/18/2024 2000 by Glenn Blanco RN)  Discharge to home or other facility with appropriate resources: Identify barriers to discharge with patient and caregiver     Problem: Pain  Goal: Verbalizes/displays adequate comfort level or baseline comfort level  1/19/2024 1748 by Emi Muniz RN  Outcome: Progressing  Flowsheets (Taken 1/19/2024 0800 by Haley Cross RN)  Verbalizes/displays adequate comfort level or baseline comfort level: Encourage patient to monitor pain and request assistance  1/19/2024 0755 by Haley Cross RN  Outcome: Progressing  Flowsheets  Taken 1/19/2024 0000 by Glenn Blanco RN  Verbalizes/displays adequate comfort level or baseline comfort level: Assess pain using appropriate pain scale  Taken 1/18/2024 2000 by Glenn Blanco RN  Verbalizes/displays adequate  Discharge: Performs mobility at highest level of function for planned discharge setting.  See evaluation for individualized goals.  Description: FUNCTIONAL STATUS PRIOR TO ADMISSION: patient ambulating with rollator    HOME SUPPORT PRIOR TO ADMISSION: patient lived with girlfriend who assisted with adls/iadls    Physical Therapy Goals  Initiated 1/19/2024  1.  Patient will move from supine to sit and sit to supine in bed with contact guard assist within 7 day(s).    2.  Patient will perform sit to stand with contact guard assist within 7 day(s).  3.  Patient will transfer from bed to chair and chair to bed with contact guard assist using the least restrictive device within 7 day(s).  4.  Patient will ambulate with contact guard assist for 50 feet with the least restrictive device within 7 day(s).   5.  Patient will improve Wild Balance score by 7 points within 7 days.     Initiated 1/13/2024  1.  Patient will move from supine to sit and sit to supine in bed with contact guard assist within 7 day(s).    2.  Patient will perform sit to stand with contact guard assist within 7 day(s).  3.  Patient will transfer from bed to chair and chair to bed with contact guard assist using the least restrictive device within 7 day(s).  4.  Patient will ambulate with contact guard assist for 50 feet with the least restrictive device within 7 day(s).   5.  Patient will improve Wild Balance score by 7 points within 7 days.   1/19/2024 1305 by Henrietta Conrad, PT  Outcome: Progressing     Problem: ABCDS Injury Assessment  Goal: Absence of physical injury  1/19/2024 1748 by Emi Muniz, RN  Outcome: Progressing  1/19/2024 0755 by Haley Cross RN  Outcome: Progressing     Problem: Neurosensory - Adult  Goal: Achieves stable or improved neurological status  1/19/2024 1748 by Emi Muniz, RN  Outcome: Progressing  Flowsheets  Taken 1/19/2024 1600 by Emi Muniz, REGIS  Achieves stable or improved neurological status:

## 2024-01-19 NOTE — PLAN OF CARE
Problem: Discharge Planning  Goal: Discharge to home or other facility with appropriate resources  Outcome: Progressing  Flowsheets (Taken 1/18/2024 2000 by Glenn Blanco, RN)  Discharge to home or other facility with appropriate resources: Identify barriers to discharge with patient and caregiver     Problem: Pain  Goal: Verbalizes/displays adequate comfort level or baseline comfort level  Outcome: Progressing  Flowsheets  Taken 1/19/2024 0000 by Glenn Blanco RN  Verbalizes/displays adequate comfort level or baseline comfort level: Assess pain using appropriate pain scale  Taken 1/18/2024 2000 by Glenn Blanco RN  Verbalizes/displays adequate comfort level or baseline comfort level: Assess pain using appropriate pain scale     Problem: Safety - Adult  Goal: Free from fall injury  Outcome: Progressing     Problem: Chronic Conditions and Co-morbidities  Goal: Patient's chronic conditions and co-morbidity symptoms are monitored and maintained or improved  Outcome: Progressing  Flowsheets (Taken 1/18/2024 2000 by Glenn Blanco RN)  Care Plan - Patient's Chronic Conditions and Co-Morbidity Symptoms are Monitored and Maintained or Improved: Monitor and assess patient's chronic conditions and comorbid symptoms for stability, deterioration, or improvement     Problem: Skin/Tissue Integrity  Goal: Absence of new skin breakdown  Description: 1.  Monitor for areas of redness and/or skin breakdown  2.  Assess vascular access sites hourly  3.  Every 4-6 hours minimum:  Change oxygen saturation probe site  4.  Every 4-6 hours:  If on nasal continuous positive airway pressure, respiratory therapy assess nares and determine need for appliance change or resting period.  Outcome: Progressing     Problem: ABCDS Injury Assessment  Goal: Absence of physical injury  Outcome: Progressing     Problem: Neurosensory - Adult  Goal: Achieves stable or improved neurological status  Outcome: Progressing  Flowsheets (Taken 1/18/2024 2000 by  Glenn Blanco, RN)  Achieves stable or improved neurological status: Assess for and report changes in neurological status  Goal: Absence of seizures  Outcome: Progressing  Goal: Remains free of injury related to seizures activity  Outcome: Progressing  Goal: Achieves maximal functionality and self care  Outcome: Progressing     Problem: Cardiovascular - Adult  Goal: Maintains optimal cardiac output and hemodynamic stability  Outcome: Progressing  Flowsheets (Taken 1/18/2024 2000 by Glenn Blanco, RN)  Maintains optimal cardiac output and hemodynamic stability: Monitor blood pressure and heart rate  Goal: Absence of cardiac dysrhythmias or at baseline  Outcome: Progressing  Flowsheets (Taken 1/18/2024 2000 by Glenn Blanco, RN)  Absence of cardiac dysrhythmias or at baseline: Monitor cardiac rate and rhythm

## 2024-01-19 NOTE — PLAN OF CARE
Problem: Occupational Therapy - Adult  Goal: By Discharge: Performs self-care activities at highest level of function for planned discharge setting.  See evaluation for individualized goals.  Description: FUNCTIONAL STATUS PRIOR TO ADMISSION:  At time of evaluation pt unable to provide PLOF. Per chart and report from RN, patient was ambulatory using a rollator for functional mobility PTA.        HOME SUPPORT: Per chart, patient lived with his partner and was independent with ADLs.     Occupational Therapy Goals  Initiated 1/13/2024, goals remain appropriate 1/19/2024  1.  Patient will perform seated grooming routine with Set-up within 7 day(s).  2.  Patient will perform anterior neck to thigh bathing, in unsupported sitting, with Minimal Assist within 7 day(s).  3.  Patient will perform upper body dressing with Minimal Assist within 7 day(s).  4.  Patient will perform lower body dressing with Moderate Assist within 7 day(s).   5.  Patient will perform toilet transfers with Minimal Assist within 7 day(s).  6.  Patient will perform all aspects of toileting with Moderate Assist within 7 day(s).  7.  Patient will participate in upper extremity therapeutic exercise/activities with Supervision within 7 day(s).    8.  Patient will improve their Fugl Horner score by 5 points in prep for ADLs within 7 days.]  Outcome: Progressing   OCCUPATIONAL THERAPY TREATMENT/WEEKLY RE-ASSESSMENT  Patient: Cale Mohan (73 y.o. male)  Date: 1/19/2024  Primary Diagnosis: Acute cerebrovascular accident (CVA) ([facility]) [I63.9]  Procedure(s) (LRB):  Loop recorder insert (N/A) 4 Days Post-Op   Precautions: Fall Risk                Chart, occupational therapy assessment, plan of care, and goals were reviewed.    ASSESSMENT  Patient continues to benefit from skilled OT services and is progressing towards goals. Patient presents this session with significant posterior lean in standing, right sided weakness and impaired coordination, requires  Seated in chair    Toileting: .  - Bowel Hygiene : Total Assistance and Standing  - Clothing Management : Total Assistance and Standing        Lower Extremity Dressing: .  - Socks : Total Assistance and Seated EOB                      Pain Rating:  None reported     Activity Tolerance:   Fair , requires rest breaks, and SpO2 stable on room air  Please refer to the flowsheet for vital signs taken during this treatment.    After treatment:   Patient left in no apparent distress sitting up in chair, Call bell within reach, and Bed/ chair alarm activated    COMMUNICATION/EDUCATION:   The patient's plan of care was discussed with: physical therapist and registered nurse    Patient Education  Education Given To: Patient  Education Provided: Role of Therapy;Transfer Training;Fall Prevention Strategies;Equipment  Education Method: Demonstration;Verbal  Barriers to Learning: Cognition (history of dementia)  Education Outcome: Verbalized understanding;Continued education needed;Unable to demonstrate understanding    Thank you for this referral.  Jeane Jimenes OTR/L  Minutes: 26

## 2024-01-19 NOTE — CARE COORDINATION
Transition of Care Plan:    IPR - [facility] - pending insurance auth through [employer] Medicare started 1/18  - Physiatry consulted    [facility] liaison: Jayde Oneil [phone]    Transport: BLS     RUR: 13%  Prior Level of Functioning: Independent   Disposition: Rehab    IPR: Date FOC offered: 1/16/24  Date FOC received: 1/16/24  Accepting facility: [facility]   Date authorization started with reference number: 1/18  Date authorization received and expires:   Transportation at discharge: Stretcher   /IMM Medicare 1/12/24    Is patient a [place] and connected with [facility]? No  Caregiver Contact: Magalis Cotter   Discharge Caregiver contacted prior to discharge? Yes   Care Conference needed? No   Barriers to discharge:    Insurance auth - started 1/18 through [employer]    Physiatry consulted.     JOVANNY Rothman (Ally).

## 2024-01-19 NOTE — PLAN OF CARE
Problem: Physical Therapy - Adult  Goal: By Discharge: Performs mobility at highest level of function for planned discharge setting.  See evaluation for individualized goals.  Description: FUNCTIONAL STATUS PRIOR TO ADMISSION: patient ambulating with rollator    HOME SUPPORT PRIOR TO ADMISSION: patient lived with girlfriend who assisted with adls/iadls    Physical Therapy Goals  Initiated 1/19/2024  1.  Patient will move from supine to sit and sit to supine in bed with contact guard assist within 7 day(s).    2.  Patient will perform sit to stand with contact guard assist within 7 day(s).  3.  Patient will transfer from bed to chair and chair to bed with contact guard assist using the least restrictive device within 7 day(s).  4.  Patient will ambulate with contact guard assist for 50 feet with the least restrictive device within 7 day(s).   5.  Patient will improve Wild Balance score by 7 points within 7 days.     Initiated 1/13/2024  1.  Patient will move from supine to sit and sit to supine in bed with contact guard assist within 7 day(s).    2.  Patient will perform sit to stand with contact guard assist within 7 day(s).  3.  Patient will transfer from bed to chair and chair to bed with contact guard assist using the least restrictive device within 7 day(s).  4.  Patient will ambulate with contact guard assist for 50 feet with the least restrictive device within 7 day(s).   5.  Patient will improve Wild Balance score by 7 points within 7 days.   Outcome: Progressing     PHYSICAL THERAPY TREATMENT: WEEKLY REASSESSMENT    Patient: Cale Mohan (73 y.o. male)  Date: 1/19/2024  Primary Diagnosis: Acute cerebrovascular accident (CVA) ([facility]) [I63.9]  Procedure(s) (LRB):  Loop recorder insert (N/A) 4 Days Post-Op   Precautions: Fall Risk                      ASSESSMENT :  Patient continues to benefit from skilled PT services and is slowly progressing towards goals. Received in bed, agreeable to treamtent. Patient

## 2024-01-20 LAB
ANION GAP SERPL CALC-SCNC: 5 MMOL/L (ref 5–15)
BASOPHILS # BLD: 0 K/UL (ref 0–0.1)
BASOPHILS NFR BLD: 0 % (ref 0–1)
BUN SERPL-MCNC: 15 MG/DL (ref 6–20)
BUN/CREAT SERPL: 13 (ref 12–20)
CALCIUM SERPL-MCNC: 9.3 MG/DL (ref 8.5–10.1)
CHLORIDE SERPL-SCNC: 107 MMOL/L (ref 97–108)
CO2 SERPL-SCNC: 28 MMOL/L (ref 21–32)
CREAT SERPL-MCNC: 1.13 MG/DL (ref 0.7–1.3)
DIFFERENTIAL METHOD BLD: ABNORMAL
EOSINOPHIL # BLD: 0.2 K/UL (ref 0–0.4)
EOSINOPHIL NFR BLD: 3 % (ref 0–7)
ERYTHROCYTE [DISTWIDTH] IN BLOOD BY AUTOMATED COUNT: 12.3 % (ref 11.5–14.5)
GLUCOSE SERPL-MCNC: 83 MG/DL (ref 65–100)
HCT VFR BLD AUTO: 46.2 % (ref 36.6–50.3)
HGB BLD-MCNC: 15.6 G/DL (ref 12.1–17)
IMM GRANULOCYTES # BLD AUTO: 0 K/UL (ref 0–0.04)
IMM GRANULOCYTES NFR BLD AUTO: 1 % (ref 0–0.5)
LYMPHOCYTES # BLD: 1.9 K/UL (ref 0.8–3.5)
LYMPHOCYTES NFR BLD: 28 % (ref 12–49)
MCH RBC QN AUTO: 30.5 PG (ref 26–34)
MCHC RBC AUTO-ENTMCNC: 33.8 G/DL (ref 30–36.5)
MCV RBC AUTO: 90.4 FL (ref 80–99)
MONOCYTES # BLD: 0.8 K/UL (ref 0–1)
MONOCYTES NFR BLD: 12 % (ref 5–13)
NEUTS SEG # BLD: 3.8 K/UL (ref 1.8–8)
NEUTS SEG NFR BLD: 56 % (ref 32–75)
NRBC # BLD: 0 K/UL (ref 0–0.01)
NRBC BLD-RTO: 0 PER 100 WBC
PLATELET # BLD AUTO: 225 K/UL (ref 150–400)
PMV BLD AUTO: 9.8 FL (ref 8.9–12.9)
POTASSIUM SERPL-SCNC: 4.5 MMOL/L (ref 3.5–5.1)
RBC # BLD AUTO: 5.11 M/UL (ref 4.1–5.7)
SODIUM SERPL-SCNC: 140 MMOL/L (ref 136–145)
WBC # BLD AUTO: 6.7 K/UL (ref 4.1–11.1)

## 2024-01-20 PROCEDURE — 6370000000 HC RX 637 (ALT 250 FOR IP): Performed by: INTERNAL MEDICINE

## 2024-01-20 PROCEDURE — 6370000000 HC RX 637 (ALT 250 FOR IP)

## 2024-01-20 PROCEDURE — 6370000000 HC RX 637 (ALT 250 FOR IP): Performed by: NURSE PRACTITIONER

## 2024-01-20 PROCEDURE — 94760 N-INVAS EAR/PLS OXIMETRY 1: CPT

## 2024-01-20 PROCEDURE — 36415 COLL VENOUS BLD VENIPUNCTURE: CPT

## 2024-01-20 PROCEDURE — 2060000000 HC ICU INTERMEDIATE R&B

## 2024-01-20 PROCEDURE — 85025 COMPLETE CBC W/AUTO DIFF WBC: CPT

## 2024-01-20 PROCEDURE — 80048 BASIC METABOLIC PNL TOTAL CA: CPT

## 2024-01-20 RX ADMIN — ASPIRIN 81 MG CHEWABLE TABLET 81 MG: 81 TABLET CHEWABLE at 09:25

## 2024-01-20 RX ADMIN — TAMSULOSIN HYDROCHLORIDE 0.4 MG: 0.4 CAPSULE ORAL at 22:57

## 2024-01-20 RX ADMIN — LOSARTAN POTASSIUM 25 MG: 25 TABLET, FILM COATED ORAL at 09:25

## 2024-01-20 RX ADMIN — FINASTERIDE 5 MG: 5 TABLET, FILM COATED ORAL at 09:25

## 2024-01-20 RX ADMIN — ATORVASTATIN CALCIUM 40 MG: 40 TABLET, FILM COATED ORAL at 22:57

## 2024-01-20 NOTE — PROGRESS NOTES
Hospitalist Progress Note  Terrence Rivera MD  Answering service: [phone] OR [phone] from in house phone        Date of Service:  2024  NAME:  Cale Mohan  :  1950  MRN:  [id number]      Admission Summary:   Per H&P: Cale Mohan is a 73 y.o. male with a history of HTN who presented to ED with complains of right sided weakness and aphasia. LKW at 1800. Patient attempted to get up to the bathroom sometime between 1101-2482 and she noticed that the patient's right arm was shaking and that he could not speak . EMS called and Code S called. Initial NIHSS 19. CT done, no hemorrhage and L ICA occlusion noted on CTA head and neck. Also non specific gas pockets in the right upper neck soft tissues noted. Tele neuro evaluated and deemed patient a candidate for TNK. Patient not on any anticoagulation. TNK was given at 1930. NIS was also notified. Patient was taken to angio for emergent mechanical thrombectomy. Intubated for procedure and then extubated and transferred to ICU post procedure for continued management.\"     Interval history / Subjective:   Seen and examined for follow up of CVA s/p thrombectomy. No complaints. NAD. No changes from yesterday.     Assessment & Plan:     Left MCA territory ischemic stroke with occlusion of M2.  Right hemiparesis due to the acute stroke  MRI findings suggestive of a cardio embolic [phenomenon   - S/P TNK and mechanical thrombectomy of left MCA on   - Repeat imaging with no evidence of hemorrhage  - SBP > 160  - ASA/statin  - Neurochecks  - PT/OT following  -Dysphagia evaluation completed, on soft and bite sized diet  - TTE unremarkable, normal EF, no ASD  -PT/OT  -Status post ILR  -pending IPR placement/auth  -No updates at this point to auth      Incidental finding of gas in right face/neck:    -nonspecific  -nothing palpable on exam, no tenderness or pain.     -follow clinically  - Repeat imaging if patient develops fever  -Serial exam  - no further intervention at this point      HTN  - Started on losartan this admission  - monitor      BPH  - Continue finasteride, tamsulosin     Dementia  - AAOx1-2 at baseline  - Supportive care    Pending auth for IPR.     Code status: full  Prophylaxis: SCDs  Care Plan discussed with: patient, nurse, CM   Anticipated Disposition: pending auth for IPR. Medically ready     Principal Problem:    Acute cerebrovascular accident (CVA) (HCC)  Active Problems:    Acute cerebrovascular accident (CVA) due to occlusion of left carotid artery (HCC)    Primary hypertension    Hyperlipidemia  Resolved Problems:    * No resolved hospital problems. *            Review of Systems:   Pertinent items are noted in HPI.         Vital Signs:    Last 24hrs VS reviewed since prior progress note. Most recent are:  /69   Pulse 85   Temp 97.8 °F (36.6 °C) (Oral)   Resp 11   Ht 1.829 m (6')   Wt 83.6 kg (184 lb 4.9 oz)   SpO2 96%   BMI 25.24 kg/m²        Intake/Output Summary (Last 24 hours) at 1/20/2024 1016  Last data filed at 1/19/2024 1338  Gross per 24 hour   Intake 600 ml   Output --   Net 600 ml          Physical Examination:     I had a face to face encounter with this patient and independently examined them on 1/20/2024 as outlined below:          General : alert x 3, awake, no acute distress,   HEENT: PEERL, EOMI, moist mucus membrane  Neck: supple, no JVD, no meningeal signs  Chest: Clear to auscultation bilaterally   CVS: S1 S2 heard, Capillary refill less than 2 seconds  Abd: soft/ non tender, non distended, BS physiological,   Ext: no clubbing, no cyanosis, no edema, brisk 2+ DP pulses  Neuro/Psych: pleasant mood and affect, CN 2-12 grossly intact, sensory grossly within normal limit, Strength 5/5 in all extremities  Skin: warm            Data Review:    Review and/or order of clinical lab test  Review and/or order of tests in the

## 2024-01-20 NOTE — PLAN OF CARE
Problem: Discharge Planning  Goal: Discharge to home or other facility with appropriate resources  Outcome: Progressing  Flowsheets (Taken 1/20/2024 0800)  Discharge to home or other facility with appropriate resources: Identify barriers to discharge with patient and caregiver     Problem: Pain  Goal: Verbalizes/displays adequate comfort level or baseline comfort level  Outcome: Progressing     Problem: Safety - Adult  Goal: Free from fall injury  Outcome: Progressing  Flowsheets (Taken 1/20/2024 0800)  Free From Fall Injury: Instruct family/caregiver on patient safety     Problem: Chronic Conditions and Co-morbidities  Goal: Patient's chronic conditions and co-morbidity symptoms are monitored and maintained or improved  Outcome: Progressing  Flowsheets (Taken 1/20/2024 0800)  Care Plan - Patient's Chronic Conditions and Co-Morbidity Symptoms are Monitored and Maintained or Improved: Monitor and assess patient's chronic conditions and comorbid symptoms for stability, deterioration, or improvement     Problem: Skin/Tissue Integrity  Goal: Absence of new skin breakdown  Description: 1.  Monitor for areas of redness and/or skin breakdown  2.  Assess vascular access sites hourly  3.  Every 4-6 hours minimum:  Change oxygen saturation probe site  4.  Every 4-6 hours:  If on nasal continuous positive airway pressure, respiratory therapy assess nares and determine need for appliance change or resting period.  Outcome: Progressing     Problem: ABCDS Injury Assessment  Goal: Absence of physical injury  Outcome: Progressing     Problem: Neurosensory - Adult  Goal: Achieves stable or improved neurological status  Outcome: Progressing  Goal: Absence of seizures  Outcome: Progressing  Goal: Remains free of injury related to seizures activity  Outcome: Progressing  Goal: Achieves maximal functionality and self care  Outcome: Progressing     Problem: Cardiovascular - Adult  Goal: Maintains optimal cardiac output and hemodynamic  stability  Outcome: Progressing  Flowsheets (Taken 1/20/2024 0800)  Maintains optimal cardiac output and hemodynamic stability: Monitor blood pressure and heart rate  Goal: Absence of cardiac dysrhythmias or at baseline  Outcome: Progressing  Flowsheets (Taken 1/20/2024 0800)  Absence of cardiac dysrhythmias or at baseline: Monitor cardiac rate and rhythm

## 2024-01-21 LAB
ANION GAP SERPL CALC-SCNC: 6 MMOL/L (ref 5–15)
BASOPHILS # BLD: 0 K/UL (ref 0–0.1)
BASOPHILS NFR BLD: 0 % (ref 0–1)
BUN SERPL-MCNC: 16 MG/DL (ref 6–20)
BUN/CREAT SERPL: 13 (ref 12–20)
CALCIUM SERPL-MCNC: 8.7 MG/DL (ref 8.5–10.1)
CHLORIDE SERPL-SCNC: 110 MMOL/L (ref 97–108)
CO2 SERPL-SCNC: 24 MMOL/L (ref 21–32)
CREAT SERPL-MCNC: 1.21 MG/DL (ref 0.7–1.3)
DIFFERENTIAL METHOD BLD: ABNORMAL
EOSINOPHIL # BLD: 0.2 K/UL (ref 0–0.4)
EOSINOPHIL NFR BLD: 3 % (ref 0–7)
ERYTHROCYTE [DISTWIDTH] IN BLOOD BY AUTOMATED COUNT: 12.3 % (ref 11.5–14.5)
GLUCOSE SERPL-MCNC: 91 MG/DL (ref 65–100)
HCT VFR BLD AUTO: 47.4 % (ref 36.6–50.3)
HGB BLD-MCNC: 15.5 G/DL (ref 12.1–17)
IMM GRANULOCYTES # BLD AUTO: 0.1 K/UL (ref 0–0.04)
IMM GRANULOCYTES NFR BLD AUTO: 1 % (ref 0–0.5)
LYMPHOCYTES # BLD: 1.8 K/UL (ref 0.8–3.5)
LYMPHOCYTES NFR BLD: 25 % (ref 12–49)
MCH RBC QN AUTO: 29.9 PG (ref 26–34)
MCHC RBC AUTO-ENTMCNC: 32.7 G/DL (ref 30–36.5)
MCV RBC AUTO: 91.3 FL (ref 80–99)
MONOCYTES # BLD: 0.8 K/UL (ref 0–1)
MONOCYTES NFR BLD: 11 % (ref 5–13)
NEUTS SEG # BLD: 4.5 K/UL (ref 1.8–8)
NEUTS SEG NFR BLD: 60 % (ref 32–75)
NRBC # BLD: 0 K/UL (ref 0–0.01)
NRBC BLD-RTO: 0 PER 100 WBC
PLATELET # BLD AUTO: 232 K/UL (ref 150–400)
PMV BLD AUTO: 9.6 FL (ref 8.9–12.9)
POTASSIUM SERPL-SCNC: 4.8 MMOL/L (ref 3.5–5.1)
RBC # BLD AUTO: 5.19 M/UL (ref 4.1–5.7)
SODIUM SERPL-SCNC: 140 MMOL/L (ref 136–145)
WBC # BLD AUTO: 7.4 K/UL (ref 4.1–11.1)

## 2024-01-21 PROCEDURE — 6370000000 HC RX 637 (ALT 250 FOR IP)

## 2024-01-21 PROCEDURE — 6370000000 HC RX 637 (ALT 250 FOR IP): Performed by: INTERNAL MEDICINE

## 2024-01-21 PROCEDURE — 36415 COLL VENOUS BLD VENIPUNCTURE: CPT

## 2024-01-21 PROCEDURE — 2060000000 HC ICU INTERMEDIATE R&B

## 2024-01-21 PROCEDURE — 85025 COMPLETE CBC W/AUTO DIFF WBC: CPT

## 2024-01-21 PROCEDURE — 80048 BASIC METABOLIC PNL TOTAL CA: CPT

## 2024-01-21 PROCEDURE — 6370000000 HC RX 637 (ALT 250 FOR IP): Performed by: NURSE PRACTITIONER

## 2024-01-21 RX ADMIN — LOSARTAN POTASSIUM 25 MG: 25 TABLET, FILM COATED ORAL at 09:28

## 2024-01-21 RX ADMIN — FINASTERIDE 5 MG: 5 TABLET, FILM COATED ORAL at 09:28

## 2024-01-21 RX ADMIN — ASPIRIN 81 MG CHEWABLE TABLET 81 MG: 81 TABLET CHEWABLE at 09:28

## 2024-01-21 RX ADMIN — ATORVASTATIN CALCIUM 40 MG: 40 TABLET, FILM COATED ORAL at 21:38

## 2024-01-21 RX ADMIN — TAMSULOSIN HYDROCHLORIDE 0.4 MG: 0.4 CAPSULE ORAL at 21:38

## 2024-01-21 NOTE — PLAN OF CARE
stability  Outcome: Progressing  Goal: Absence of cardiac dysrhythmias or at baseline  Outcome: Progressing

## 2024-01-21 NOTE — CONSULTS
Consult Note            Date:1/21/2024        Patient Name:Cale Mohan     YOB: 1950     Age:73 y.o.    Consults Physical Medicine and Rehabilitation    Chief Complaint     Chief Complaint   Patient presents with    Cerebrovascular Accident     Patient found slumped over by wife. Right arm contracted, left gaze, aphasic. Last known well 6pm today.           History Obtained From   patient, electronic medical record    History of Present Illness   Mr. Mohan is a 73-year-old male with a history of hypertension who presented to the ED with complaints of right-sided weakness and aphasia last known well at 1800 on 1/11/2024.  Patient attempted to get up to go to the bathroom between 1818 31 was noted the patient's right arm was shaking and he could not speak.  EMS was called.  Patient's NIH scale was 19.  CT showed no hemorrhage.  CTA showed a left ICA occlusion.  Patient also noted to have nonspecific gas pockets in the right upper neck soft tissues.  Teleneuro evaluated the patient and deemed him a candidate for TNK.  Patient not on anticoagulation.  TNK was given at 1930.  Patient was then taken for an emergent mechanical thrombectomy.  MRI revealed a left MCA territory stroke with occlusion of M2.  Findings suggestive of cardioembolic phenomenon.  TTE was unremarkable with a normal ejection fraction and no atrial septal defect.  Patient evaluated by speech therapy who recommended soft and bite-size diet due to dysphagia.    Physical medicine rehabilitation has been consulted for disposition recommendations and appropriateness for acute inpatient rehabilitation.    Patient lives with his girlfriend who assisted with ADLs and IADLs.  He was modified independent and ambulated with a rollator.    Physical therapy note on 1/19/2024: Patient continues to benefit from skilled PT services and is slowly progressing towards goals.  Patient with significant retrograde lean and stands.  Recommending Damaris bartlett

## 2024-01-21 NOTE — PROGRESS NOTES
Hospitalist Progress Note  Terrence Rivera MD  Answering service: [phone] OR [phone] from in house phone        Date of Service:  2024  NAME:  Cale Mohan  :  1950  MRN:  [id number]      Admission Summary:   Per H&P: Cale Mohan is a 73 y.o. male with a history of HTN who presented to ED with complains of right sided weakness and aphasia. LKW at 1800. Patient attempted to get up to the bathroom sometime between 9764-5959 and she noticed that the patient's right arm was shaking and that he could not speak . EMS called and Code S called. Initial NIHSS 19. CT done, no hemorrhage and L ICA occlusion noted on CTA head and neck. Also non specific gas pockets in the right upper neck soft tissues noted. Tele neuro evaluated and deemed patient a candidate for TNK. Patient not on any anticoagulation. TNK was given at 1930. NIS was also notified. Patient was taken to angio for emergent mechanical thrombectomy. Intubated for procedure and then extubated and transferred to ICU post procedure for continued management.\"     Interval history / Subjective:   Seen and examined for follow up of CVA s/p thrombectomy. Looks comfortable. Denies acute complaints.      Assessment & Plan:     Left MCA territory ischemic stroke with occlusion of M2.  Right hemiparesis due to the acute stroke  MRI findings suggestive of a cardio embolic [phenomenon   - S/P TNK and mechanical thrombectomy of left MCA on   - Repeat imaging with no evidence of hemorrhage  - SBP > 160  - ASA/statin  - Neurochecks  - PT/OT following  -Dysphagia evaluation completed, on soft and bite sized diet  - TTE unremarkable, normal EF, no ASD  -PT/OT  -Status post ILR  -Pending auth for IPR     Incidental finding of gas in right face/neck:    -nonspecific  -nothing palpable on exam, no tenderness or pain.    -follow clinically  - Repeat imaging if  IntraVENous PRN    sodium chloride flush 0.9 % injection 5-40 mL  5-40 mL IntraVENous PRN    0.9 % sodium chloride infusion   IntraVENous PRN    albuterol (PROVENTIL) (2.5 MG/3ML) 0.083% nebulizer solution 2.5 mg  2.5 mg Nebulization Q6H PRN    ondansetron (ZOFRAN-ODT) disintegrating tablet 4 mg  4 mg Oral Q8H PRN    Or    ondansetron (ZOFRAN) injection 4 mg  4 mg IntraVENous Q6H PRN    senna (SENOKOT) 8.8 MG/5ML syrup 8.8 mg  5 mL Oral BID PRN    acetaminophen (TYLENOL) tablet 650 mg  650 mg Oral Q6H PRN    Or    acetaminophen (TYLENOL) suppository 650 mg  650 mg Rectal Q6H PRN     ______________________________________________________________________  EXPECTED LENGTH OF STAY: Unable to retrieve estimated LOS  ACTUAL LENGTH OF STAY:          10                 Terrence Rivera MD

## 2024-01-22 PROCEDURE — 97535 SELF CARE MNGMENT TRAINING: CPT

## 2024-01-22 PROCEDURE — 97112 NEUROMUSCULAR REEDUCATION: CPT

## 2024-01-22 PROCEDURE — 2060000000 HC ICU INTERMEDIATE R&B

## 2024-01-22 PROCEDURE — 97530 THERAPEUTIC ACTIVITIES: CPT

## 2024-01-22 PROCEDURE — 6370000000 HC RX 637 (ALT 250 FOR IP)

## 2024-01-22 PROCEDURE — 6370000000 HC RX 637 (ALT 250 FOR IP): Performed by: NURSE PRACTITIONER

## 2024-01-22 PROCEDURE — 6370000000 HC RX 637 (ALT 250 FOR IP): Performed by: INTERNAL MEDICINE

## 2024-01-22 RX ADMIN — ASPIRIN 81 MG CHEWABLE TABLET 81 MG: 81 TABLET CHEWABLE at 08:35

## 2024-01-22 RX ADMIN — ATORVASTATIN CALCIUM 40 MG: 40 TABLET, FILM COATED ORAL at 21:03

## 2024-01-22 RX ADMIN — LOSARTAN POTASSIUM 25 MG: 25 TABLET, FILM COATED ORAL at 08:35

## 2024-01-22 RX ADMIN — FINASTERIDE 5 MG: 5 TABLET, FILM COATED ORAL at 08:35

## 2024-01-22 RX ADMIN — TAMSULOSIN HYDROCHLORIDE 0.4 MG: 0.4 CAPSULE ORAL at 21:03

## 2024-01-22 ASSESSMENT — ENCOUNTER SYMPTOMS
GASTROINTESTINAL NEGATIVE: 1
RESPIRATORY NEGATIVE: 1

## 2024-01-22 NOTE — PLAN OF CARE
Problem: Occupational Therapy - Adult  Goal: By Discharge: Performs self-care activities at highest level of function for planned discharge setting.  See evaluation for individualized goals.  Description: FUNCTIONAL STATUS PRIOR TO ADMISSION:  At time of evaluation pt unable to provide PLOF. Per chart and report from RN, patient was ambulatory using a rollator for functional mobility PTA.        HOME SUPPORT: Per chart, patient lived with his partner and was independent with ADLs.     Occupational Therapy Goals  Initiated 1/13/2024, goals remain appropriate 1/19/2024  1.  Patient will perform seated grooming routine with Set-up within 7 day(s).  2.  Patient will perform anterior neck to thigh bathing, in unsupported sitting, with Minimal Assist within 7 day(s).  3.  Patient will perform upper body dressing with Minimal Assist within 7 day(s).  4.  Patient will perform lower body dressing with Moderate Assist within 7 day(s).   5.  Patient will perform toilet transfers with Minimal Assist within 7 day(s).  6.  Patient will perform all aspects of toileting with Moderate Assist within 7 day(s).  7.  Patient will participate in upper extremity therapeutic exercise/activities with Supervision within 7 day(s).    8.  Patient will improve their Fugl Horner score by 5 points in prep for ADLs within 7 days.]    Outcome: Progressing    OCCUPATIONAL THERAPY TREATMENT  Patient: Cale Mohan (73 y.o. male)  Date: 1/22/2024  Primary Diagnosis: Acute cerebrovascular accident (CVA) ([facility]) [I63.9]  Procedure(s) (LRB):  Loop recorder insert (N/A) 7 Days Post-Op   Precautions: Fall Risk                Chart, occupational therapy assessment, plan of care, and goals were reviewed.    ASSESSMENT  Patient continues to benefit from skilled OT services and is slowly progressing towards goals. Patient ADLs continue to be limited by impaired balance, generalized weakness, limited lower body access, decreased functional activity tolerance and  impaired cognition (attention to task, command following, sequencing, problem solving, etc). Patient required mod A to come to sitting EOB. Patient arianne noted to have malfunctioned and brief and sera wet. Patient required max A to for vianca care and to change brief. Patient required min A to change gown - required multimodal cues and additional time for all ADLs and mobility. Patient with max A x2 to stand and transfer to chair. Patient left sitting in chair with call bell in reach, RN aware, alarm active and all needs met. Will continue to follow, continue to recommend IPR once cleared as patient well below baseline.      PLAN :  Patient continues to benefit from skilled intervention to address the above impairments.  Continue treatment per established plan of care to address goals.    Recommend with staff: Recommend with nursing, ADLs with supervision/setup, OOB to chair 3x/day via sera steady and toileting via beside commode with 2 assist. Thank you for completing as able in order to maintain patient strength, endurance and independence.       Recommend next OT session: toileting on [facility]    Recommendation for discharge: (in order for the patient to meet his/her long term goals): Therapy 3 hours/day 5-7 days/week    Other factors to consider for discharge: patient's current support system is unable to meet their requirements for physical assistance, poor safety awareness, impaired cognition, high risk for falls, not safe to be alone, and concern for safely navigating or managing the home environment    IF patient discharges home will need the following DME: hospital bed, mechanical lift, and transfer bench       SUBJECTIVE:   Patient stated “I think I had a stroke.\" Re: when asked reason for admission    OBJECTIVE DATA SUMMARY:   Cognitive/Behavioral Status:  Orientation  Overall Orientation Status: Impaired  Orientation Level: Oriented to person;Oriented to place;Oriented to situation;Disoriented to

## 2024-01-22 NOTE — PLAN OF CARE
Problem: Discharge Planning  Goal: Discharge to home or other facility with appropriate resources  1/21/2024 2331 by Louise Bacon RN  Outcome: Progressing  Flowsheets (Taken 1/21/2024 2000)  Discharge to home or other facility with appropriate resources:   Identify barriers to discharge with patient and caregiver   Arrange for needed discharge resources and transportation as appropriate   Identify discharge learning needs (meds, wound care, etc)  1/21/2024 1339 by Jessica Fuentes RN  Outcome: Progressing  Flowsheets (Taken 1/21/2024 0800)  Discharge to home or other facility with appropriate resources: Identify barriers to discharge with patient and caregiver     Problem: Pain  Goal: Verbalizes/displays adequate comfort level or baseline comfort level  1/21/2024 2331 by Louise Bacon RN  Outcome: Progressing  1/21/2024 1339 by Jessica Fuentes RN  Outcome: Progressing     Problem: Safety - Adult  Goal: Free from fall injury  1/21/2024 2331 by Louise Bacon RN  Outcome: Progressing  Flowsheets (Taken 1/21/2024 2000)  Free From Fall Injury: Instruct family/caregiver on patient safety  1/21/2024 1339 by Jessica Fuentes RN  Outcome: Progressing  Flowsheets (Taken 1/21/2024 0800)  Free From Fall Injury: Instruct family/caregiver on patient safety     Problem: Chronic Conditions and Co-morbidities  Goal: Patient's chronic conditions and co-morbidity symptoms are monitored and maintained or improved  1/21/2024 2331 by Louise Bacon RN  Outcome: Progressing  Flowsheets (Taken 1/21/2024 2000)  Care Plan - Patient's Chronic Conditions and Co-Morbidity Symptoms are Monitored and Maintained or Improved:   Monitor and assess patient's chronic conditions and comorbid symptoms for stability, deterioration, or improvement   Collaborate with multidisciplinary team to address chronic and comorbid conditions and prevent exacerbation or deterioration   Update acute care plan with appropriate goals if chronic or  comorbid symptoms are exacerbated and prevent overall improvement and discharge  1/21/2024 1339 by Jessica Fuentes RN  Outcome: Progressing  Flowsheets (Taken 1/21/2024 0800)  Care Plan - Patient's Chronic Conditions and Co-Morbidity Symptoms are Monitored and Maintained or Improved: Monitor and assess patient's chronic conditions and comorbid symptoms for stability, deterioration, or improvement     Problem: Skin/Tissue Integrity  Goal: Absence of new skin breakdown  Description: 1.  Monitor for areas of redness and/or skin breakdown  2.  Assess vascular access sites hourly  3.  Every 4-6 hours minimum:  Change oxygen saturation probe site  4.  Every 4-6 hours:  If on nasal continuous positive airway pressure, respiratory therapy assess nares and determine need for appliance change or resting period.  1/21/2024 2331 by Louise Bacon RN  Outcome: Progressing  1/21/2024 1339 by Jessica Fuentes RN  Outcome: Progressing     Problem: ABCDS Injury Assessment  Goal: Absence of physical injury  1/21/2024 2331 by Louise Bacon RN  Outcome: Progressing  1/21/2024 1339 by Jessica Fuentes RN  Outcome: Progressing     Problem: Neurosensory - Adult  Goal: Achieves stable or improved neurological status  1/21/2024 2331 by Louise Bacon RN  Outcome: Progressing  Flowsheets (Taken 1/21/2024 2000)  Achieves stable or improved neurological status:   Assess for and report changes in neurological status   Maintain blood pressure and fluid volume within ordered parameters to optimize cerebral perfusion and minimize risk of hemorrhage  1/21/2024 1339 by Jessica Fuentes RN  Outcome: Progressing  Goal: Absence of seizures  1/21/2024 2331 by Louise Bacon RN  Outcome: Progressing  1/21/2024 1339 by Jessica Fuentes RN  Outcome: Progressing  Goal: Remains free of injury related to seizures activity  1/21/2024 2331 by Louise Bacon RN  Outcome: Progressing  1/21/2024 1339 by Jessica Fuentes RN  Outcome:

## 2024-01-22 NOTE — PROGRESS NOTES
NUTRITION  Reason for Assessment: LOS      Recommendations/Interventions/Plan:   Continue diet as ordered    Will rescreen per policy     History reviewed. No pertinent past medical history.      Pt screened for LOS.  Chart/labs/meds reviewed.  Admitted with Acute cerebrovascular accident (CVA) (HCC) [I63.9]  Via chart review, pt consuming >75% of meals. No acute/overt malnutrition concerns at this time, medically stable for discharge awaiting insurance auth.      Nutrition Related Findings:   Edema: Generalized   Edema Generalized: Trace                  Last BM: 01/20/23      Skin: Intact      Current Nutrition Therapies:  Diet: Soft and bite sized  Supplements: None ordered  Meal Intake:   Patient Vitals for the past 168 hrs:   PO Meals Eaten (%)   01/22/24 0800 76 - 100%   01/19/24 1338 76 - 100%   01/19/24 0800 76 - 100%   01/17/24 1700 76 - 100%   01/17/24 1212 76 - 100%   01/17/24 0800 76 - 100%     Supplement Intake:  No data found.      Weight Hx:  Wt Readings from Last 10 Encounters:   01/22/24 83.9 kg (184 lb 15.5 oz)   01/12/24 86.2 kg (190 lb 0.6 oz)         Recent Labs     01/20/24  0723 01/21/24  0556   GLUCOSE 83 91   BUN 15 16   CREATININE 1.13 1.21    140   K 4.5 4.8    110*   CO2 28 24   CALCIUM 9.3 8.7       No results for input(s): \"POCGLU\" in the last 72 hours.    Lab Results   Component Value Date/Time    LABA1C 5.5 01/13/2024 07:52 AM    LABA1C 5.6 01/12/2024 03:59 AM     01/13/2024 07:52 AM           Sarah Avendaño RD  Available via [employer] or ext [phone]

## 2024-01-22 NOTE — PLAN OF CARE
Problem: Discharge Planning  Goal: Discharge to home or other facility with appropriate resources  1/21/2024 2331 by Louise Bacon RN  Outcome: Progressing  Flowsheets (Taken 1/21/2024 2000)  Discharge to home or other facility with appropriate resources:   Identify barriers to discharge with patient and caregiver   Arrange for needed discharge resources and transportation as appropriate   Identify discharge learning needs (meds, wound care, etc)     Problem: Safety - Adult  Goal: Free from fall injury  1/21/2024 2331 by Louise Bacon RN  Outcome: Progressing  Flowsheets (Taken 1/21/2024 2000)  Free From Fall Injury: Instruct family/caregiver on patient safety     Problem: Skin/Tissue Integrity  Goal: Absence of new skin breakdown  Description: 1.  Monitor for areas of redness and/or skin breakdown  2.  Assess vascular access sites hourly  3.  Every 4-6 hours minimum:  Change oxygen saturation probe site  4.  Every 4-6 hours:  If on nasal continuous positive airway pressure, respiratory therapy assess nares and determine need for appliance change or resting period.  1/21/2024 2331 by Louise Bacon RN  Outcome: Progressing     Problem: ABCDS Injury Assessment  Goal: Absence of physical injury  1/21/2024 2331 by Louise Bacon RN  Outcome: Progressing     Problem: Neurosensory - Adult  Goal: Achieves stable or improved neurological status  1/21/2024 2331 by Louise Bacon RN  Outcome: Progressing  Flowsheets (Taken 1/21/2024 2000)  Achieves stable or improved neurological status:   Assess for and report changes in neurological status   Maintain blood pressure and fluid volume within ordered parameters to optimize cerebral perfusion and minimize risk of hemorrhage  Goal: Absence of seizures  1/21/2024 2331 by Louise Bacon RN  Outcome: Progressing  Goal: Remains free of injury related to seizures activity  1/21/2024 2331 by Louise Bacon RN  Outcome: Progressing  Goal:

## 2024-01-22 NOTE — PROGRESS NOTES
Hospitalist Progress Note  Terrence Rivera MD  Answering service: [phone] OR [phone] from in house phone        Date of Service:  2024  NAME:  Cale Mohan  :  1950  MRN:  [id number]      Admission Summary:   Per H&P: Cale Mohan is a 73 y.o. male with a history of HTN who presented to ED with complains of right sided weakness and aphasia. LKW at 1800. Patient attempted to get up to the bathroom sometime between 5948-7360 and she noticed that the patient's right arm was shaking and that he could not speak . EMS called and Code S called. Initial NIHSS 19. CT done, no hemorrhage and L ICA occlusion noted on CTA head and neck. Also non specific gas pockets in the right upper neck soft tissues noted. Tele neuro evaluated and deemed patient a candidate for TNK. Patient not on any anticoagulation. TNK was given at 1930. NIS was also notified. Patient was taken to angio for emergent mechanical thrombectomy. Intubated for procedure and then extubated and transferred to ICU post procedure for continued management.\"     Interval history / Subjective:   Seen and examined for follow up of CVA s/p thrombectomy. No acute complaints. Doing well.      Assessment & Plan:     Left MCA territory ischemic stroke with occlusion of M2.  Right hemiparesis due to the acute stroke  MRI findings suggestive of a cardio embolic [phenomenon   - S/P TNK and mechanical thrombectomy of left MCA on   - Repeat imaging with no evidence of hemorrhage  - SBP > 160  - ASA/statin  - Neurochecks  - PT/OT following  -Dysphagia evaluation completed, on soft and bite sized diet  - TTE unremarkable, normal EF, no ASD  -PT/OT  -Status post ILR  -Pending auth for IPR     Incidental finding of gas in right face/neck:    -nonspecific  -nothing palpable on exam, no tenderness or pain.    -follow clinically  - Repeat imaging if patient

## 2024-01-22 NOTE — PLAN OF CARE
Problem: Neurosensory - Adult  Goal: Achieves stable or improved neurological status  1/21/2024 2331 by Louise Bacon RN  Outcome: Progressing  Flowsheets (Taken 1/21/2024 2000)  Achieves stable or improved neurological status:   Assess for and report changes in neurological status   Maintain blood pressure and fluid volume within ordered parameters to optimize cerebral perfusion and minimize risk of hemorrhage  Goal: Absence of seizures  1/21/2024 2331 by Louise Bacon RN  Outcome: Progressing  Goal: Remains free of injury related to seizures activity  1/21/2024 2331 by Louise Bacon RN  Outcome: Progressing  Goal: Achieves maximal functionality and self care  1/21/2024 2331 by Louise Bacon RN  Outcome: Progressing     Problem: Discharge Planning  Goal: Discharge to home or other facility with appropriate resources  1/21/2024 2331 by Louise Bacon RN  Outcome: Progressing  Flowsheets (Taken 1/21/2024 2000)  Discharge to home or other facility with appropriate resources:   Identify barriers to discharge with patient and caregiver   Arrange for needed discharge resources and transportation as appropriate   Identify discharge learning needs (meds, wound care, etc)     Problem: Pain  Goal: Verbalizes/displays adequate comfort level or baseline comfort level  1/21/2024 2331 by Louise Bacon RN  Outcome: Progressing     Problem: Safety - Adult  Goal: Free from fall injury  1/21/2024 2331 by Louise Bacon RN  Outcome: Progressing  Flowsheets (Taken 1/21/2024 2000)  Free From Fall Injury: Instruct family/caregiver on patient safety     Problem: Chronic Conditions and Co-morbidities  Goal: Patient's chronic conditions and co-morbidity symptoms are monitored and maintained or improved  1/21/2024 2331 by Louise Bacon RN  Outcome: Progressing  Flowsheets (Taken 1/21/2024 2000)  Care Plan - Patient's Chronic Conditions and Co-Morbidity Symptoms are Monitored and

## 2024-01-22 NOTE — CARE COORDINATION
Transition of Care Plan:    IPR - [facility] - pending insurance auth through [facility] Medicare started 1/18    [facility] liaison: Jayde Oneil [phone]    Transport: BLS     RUR: 12%  Prior Level of Functioning: Independent   Disposition: Rehab    IPR: Date FOC offered: 1/16/24  Date FOC received: 1/16/24  Accepting facility: [facility]   Date authorization started with reference number: 1/18  Date authorization received and expires:   Transportation at discharge: Stretcher   /IMM Medicare 1/12/24    Is patient a [employer] and connected with VA? No  Caregiver Contact: Magalis Cotter   Discharge Caregiver contacted prior to discharge? Yes   Care Conference needed? No   Barriers to discharge:    Insurance auth - started 1/18 through [facility] Medicare    1/22 - Auth still pending at this time.    JOVANNY Rothman (Ally).

## 2024-01-22 NOTE — PLAN OF CARE
Problem: Physical Therapy - Adult  Goal: By Discharge: Performs mobility at highest level of function for planned discharge setting.  See evaluation for individualized goals.  Description: FUNCTIONAL STATUS PRIOR TO ADMISSION: patient ambulating with rollator    HOME SUPPORT PRIOR TO ADMISSION: patient lived with girlfriend who assisted with adls/iadls    Physical Therapy Goals  Initiated 1/19/2024  1.  Patient will move from supine to sit and sit to supine in bed with contact guard assist within 7 day(s).    2.  Patient will perform sit to stand with contact guard assist within 7 day(s).  3.  Patient will transfer from bed to chair and chair to bed with contact guard assist using the least restrictive device within 7 day(s).  4.  Patient will ambulate with contact guard assist for 50 feet with the least restrictive device within 7 day(s).   5.  Patient will improve Wild Balance score by 7 points within 7 days.     Initiated 1/13/2024  1.  Patient will move from supine to sit and sit to supine in bed with contact guard assist within 7 day(s).    2.  Patient will perform sit to stand with contact guard assist within 7 day(s).  3.  Patient will transfer from bed to chair and chair to bed with contact guard assist using the least restrictive device within 7 day(s).  4.  Patient will ambulate with contact guard assist for 50 feet with the least restrictive device within 7 day(s).   5.  Patient will improve Wild Balance score by 7 points within 7 days.   Outcome: Progressing     PHYSICAL THERAPY TREATMENT    Patient: Cale Mohan (73 y.o. male)  Date: 1/22/2024  Diagnosis: Acute cerebrovascular accident (CVA) ([facility]) [I63.9] Acute cerebrovascular accident (CVA) ([facility])  Procedure(s) (LRB):  Loop recorder insert (N/A) 7 Days Post-Op  Precautions: Fall Risk                      ASSESSMENT:  Patient continues to benefit from skilled PT services and is slowly progressing towards goals however remains most limited by impaired

## 2024-01-23 VITALS
HEIGHT: 72 IN | RESPIRATION RATE: 29 BRPM | BODY MASS INDEX: 24.99 KG/M2 | SYSTOLIC BLOOD PRESSURE: 124 MMHG | TEMPERATURE: 97.3 F | DIASTOLIC BLOOD PRESSURE: 81 MMHG | WEIGHT: 184.53 LBS | HEART RATE: 86 BPM | OXYGEN SATURATION: 96 %

## 2024-01-23 LAB
ANION GAP SERPL CALC-SCNC: 5 MMOL/L (ref 5–15)
BUN SERPL-MCNC: 19 MG/DL (ref 6–20)
BUN/CREAT SERPL: 17 (ref 12–20)
CALCIUM SERPL-MCNC: 9.2 MG/DL (ref 8.5–10.1)
CHLORIDE SERPL-SCNC: 107 MMOL/L (ref 97–108)
CO2 SERPL-SCNC: 26 MMOL/L (ref 21–32)
CREAT SERPL-MCNC: 1.14 MG/DL (ref 0.7–1.3)
GLUCOSE SERPL-MCNC: 94 MG/DL (ref 65–100)
POTASSIUM SERPL-SCNC: 4.6 MMOL/L (ref 3.5–5.1)
SODIUM SERPL-SCNC: 138 MMOL/L (ref 136–145)

## 2024-01-23 PROCEDURE — 36415 COLL VENOUS BLD VENIPUNCTURE: CPT

## 2024-01-23 PROCEDURE — 80048 BASIC METABOLIC PNL TOTAL CA: CPT

## 2024-01-23 PROCEDURE — 2580000003 HC RX 258: Performed by: NURSE PRACTITIONER

## 2024-01-23 PROCEDURE — 6370000000 HC RX 637 (ALT 250 FOR IP): Performed by: INTERNAL MEDICINE

## 2024-01-23 PROCEDURE — 6370000000 HC RX 637 (ALT 250 FOR IP)

## 2024-01-23 PROCEDURE — 94760 N-INVAS EAR/PLS OXIMETRY 1: CPT

## 2024-01-23 RX ORDER — LOSARTAN POTASSIUM 25 MG/1
25 TABLET ORAL DAILY
Qty: 30 TABLET | Refills: 3 | Status: SHIPPED
Start: 2024-01-24

## 2024-01-23 RX ORDER — ASPIRIN 81 MG/1
81 TABLET, CHEWABLE ORAL DAILY
Qty: 30 TABLET | Refills: 3 | Status: SHIPPED
Start: 2024-01-24

## 2024-01-23 RX ORDER — ATORVASTATIN CALCIUM 40 MG/1
40 TABLET, FILM COATED ORAL NIGHTLY
Qty: 30 TABLET | Refills: 3 | Status: SHIPPED
Start: 2024-01-23

## 2024-01-23 RX ADMIN — ASPIRIN 81 MG CHEWABLE TABLET 81 MG: 81 TABLET CHEWABLE at 08:07

## 2024-01-23 RX ADMIN — FINASTERIDE 5 MG: 5 TABLET, FILM COATED ORAL at 08:07

## 2024-01-23 RX ADMIN — LOSARTAN POTASSIUM 25 MG: 25 TABLET, FILM COATED ORAL at 08:07

## 2024-01-23 RX ADMIN — SODIUM CHLORIDE, PRESERVATIVE FREE 10 ML: 5 INJECTION INTRAVENOUS at 10:46

## 2024-01-23 NOTE — CARE COORDINATION
Transition of Care Plan:    IPR - [facility] - Auth received/DC today  RN to call report to [phone]    [facility] liaison: Jayde Oneil [phone]    Transport: BLS - [facility] 1300     RUR: 12%  Prior Level of Functioning: Independent   Disposition: Rehab    IPR: Date FOC offered: 1/16/24  Date FOC received: 1/16/24  Accepting facility: [facility]   Date authorization started with reference number: 1/18  Date authorization received and expires: 1/23  Transportation at discharge: Stretcher   IM/IMM Medicare 1/12/24    Is patient a  and connected with VA? No  Caregiver Contact: Magalis Cotter [phone]   Discharge Caregiver contacted prior to discharge? Yes   Care Conference needed? No   Barriers to discharge:  None    Pt has received insurance auth for IPR at [facility] and they have confirmed bed today after 1300.     [facility] transport scheduled for 1300.    CM met with Pt and he is in agreement with discharge plan. Message left for Pt's partner, Magalis regarding DC today.    KAREN Rothman (Ally)S.DIANE.

## 2024-01-23 NOTE — PLAN OF CARE
Problem: Discharge Planning  Goal: Discharge to home or other facility with appropriate resources  1/23/2024 0328 by Louise Bacon RN  Outcome: Progressing  Flowsheets (Taken 1/22/2024 2000)  Discharge to home or other facility with appropriate resources:   Identify barriers to discharge with patient and caregiver   Arrange for needed discharge resources and transportation as appropriate   Identify discharge learning needs (meds, wound care, etc)     Problem: Pain  Goal: Verbalizes/displays adequate comfort level or baseline comfort level  1/23/2024 0328 by Louise Bacon RN  Outcome: Progressing     Problem: Safety - Adult  Goal: Free from fall injury  1/23/2024 0328 by Louise Bacon RN  Outcome: Progressing  Flowsheets (Taken 1/22/2024 2000)  Free From Fall Injury: Instruct family/caregiver on patient safety     Problem: Chronic Conditions and Co-morbidities  Goal: Patient's chronic conditions and co-morbidity symptoms are monitored and maintained or improved  1/23/2024 0328 by Louise Bacon RN  Outcome: Progressing  Flowsheets (Taken 1/22/2024 2000)  Care Plan - Patient's Chronic Conditions and Co-Morbidity Symptoms are Monitored and Maintained or Improved:   Monitor and assess patient's chronic conditions and comorbid symptoms for stability, deterioration, or improvement   Collaborate with multidisciplinary team to address chronic and comorbid conditions and prevent exacerbation or deterioration   Update acute care plan with appropriate goals if chronic or comorbid symptoms are exacerbated and prevent overall improvement and discharge     Problem: Skin/Tissue Integrity  Goal: Absence of new skin breakdown  Description: 1.  Monitor for areas of redness and/or skin breakdown  2.  Assess vascular access sites hourly  3.  Every 4-6 hours minimum:  Change oxygen saturation probe site  4.  Every 4-6 hours:  If on nasal continuous positive airway pressure, respiratory therapy assess nares

## 2024-01-23 NOTE — PLAN OF CARE
Problem: Discharge Planning  Goal: Discharge to home or other facility with appropriate resources  Outcome: Progressing  Flowsheets (Taken 1/22/2024 2000)  Discharge to home or other facility with appropriate resources:   Identify barriers to discharge with patient and caregiver   Arrange for needed discharge resources and transportation as appropriate   Identify discharge learning needs (meds, wound care, etc)     Problem: Pain  Goal: Verbalizes/displays adequate comfort level or baseline comfort level  Outcome: Progressing     Problem: Safety - Adult  Goal: Free from fall injury  Outcome: Progressing  Flowsheets (Taken 1/22/2024 2000)  Free From Fall Injury: Instruct family/caregiver on patient safety     Problem: Chronic Conditions and Co-morbidities  Goal: Patient's chronic conditions and co-morbidity symptoms are monitored and maintained or improved  Outcome: Progressing  Flowsheets (Taken 1/22/2024 2000)  Care Plan - Patient's Chronic Conditions and Co-Morbidity Symptoms are Monitored and Maintained or Improved:   Monitor and assess patient's chronic conditions and comorbid symptoms for stability, deterioration, or improvement   Collaborate with multidisciplinary team to address chronic and comorbid conditions and prevent exacerbation or deterioration   Update acute care plan with appropriate goals if chronic or comorbid symptoms are exacerbated and prevent overall improvement and discharge     Problem: Skin/Tissue Integrity  Goal: Absence of new skin breakdown  Description: 1.  Monitor for areas of redness and/or skin breakdown  2.  Assess vascular access sites hourly  3.  Every 4-6 hours minimum:  Change oxygen saturation probe site  4.  Every 4-6 hours:  If on nasal continuous positive airway pressure, respiratory therapy assess nares and determine need for appliance change or resting period.  Outcome: Progressing     Problem: Occupational Therapy - Adult  Goal: By Discharge: Performs self-care activities

## 2024-01-23 NOTE — DISCHARGE SUMMARY
PT/OT   -Dysphagia evaluation completed, on soft and bite sized diet  - TTE unremarkable, normal EF, no ASD  -Status post ILR 1/15, follow up with cardio as below      Incidental finding of gas in right face/neck:    -nonspecific  -nothing palpable on exam, no tenderness or pain.    -follow clinically  - Repeat imaging if patient develops fever  -Serial exam  - no further intervention at this point      HTN  - Started on losartan this admission  - monitor      BPH  - Continue finasteride, tamsulosin     Dementia  - AAOx1-2 at baseline  - Supportive care          PENDING TEST RESULTS:   At the time of discharge the following test results are still pending: N/a    FOLLOW UP APPOINTMENTS:    Follow-up Information       Follow up With Specialties Details Why Contact Info    Sherly Latham MD Internal Medicine Cardiovascular Disease Follow up  [address]  [address]  [facility] [id number] [phone]      Sherly Latham MD Internal Medicine Cardiovascular Disease   [address]  [address]  [facility] [id number] [phone]      Latisha Tay MD Neurology Follow up  [address]  [facility] [id number] [phone]                ADDITIONAL CARE RECOMMENDATIONS:   Follow up with Cardiology  Follow up with Neurology  Continue daily ASA 81mg and Lipitor 40mg   Monitor BP daily and follow up with PCP    DIET: low fat, low cholesterol diet    ACTIVITY: activity as tolerated with assistance     WOUND CARE: N/a    EQUIPMENT needed: N/a      DISCHARGE MEDICATIONS:     Medication List        START taking these medications      aspirin 81 MG chewable tablet  Take 1 tablet by mouth daily  Start taking on: January 24, 2024     atorvastatin 40 MG tablet  Commonly known as: LIPITOR  Take 1 tablet by mouth nightly     losartan 25 MG tablet  Commonly known as: COZAAR  Take 1 tablet by mouth daily  Start taking on: January 24, 2024            CONTINUE taking these medications      finasteride 5 MG tablet  Commonly  known as: PROSCAR     fluticasone-salmeterol 100-50 MCG/ACT Aepb diskus inhaler  Commonly known as: ADVAIR     tamsulosin 0.4 MG capsule  Commonly known as: FLOMAX               Where to Get Your Medications        Information about where to get these medications is not yet available    Ask your nurse or doctor about these medications  aspirin 81 MG chewable tablet  atorvastatin 40 MG tablet  losartan 25 MG tablet           NOTIFY YOUR PHYSICIAN FOR ANY OF THE FOLLOWING:   Fever over 101 degrees for 24 hours.   Chest pain, shortness of breath, fever, chills, nausea, vomiting, diarrhea, change in mentation, falling, weakness, bleeding. Severe pain or pain not relieved by medications.  Or, any other signs or symptoms that you may have questions about.    DISPOSITION:    Home With:   OT  PT  HH  RN      X Long term SNF/Inpatient Rehab    Independent/assisted living    Hospice    Other:       PATIENT CONDITION AT DISCHARGE:     Functional status    Poor    X Deconditioned     Independent      Cognition     Lucid     Forgetful    X Dementia      Catheters/lines (plus indication)   X Sanchez     PICC     PEG     None      Code status    X Full code     DNR      PHYSICAL EXAMINATION AT DISCHARGE:    General : alert x 3, awake, no acute distress,   HEENT: PEERL, EOMI, moist mucus membrane  Neck: supple, no JVD, no meningeal signs  Chest: Clear to auscultation bilaterally   CVS: S1 S2 heard, Capillary refill less than 2 seconds  Abd: soft/ Non tender, non distended, BS physiological,   Ext: no clubbing, no cyanosis, no edema, brisk 2+ DP pulses  Neuro/Psych: pleasant mood and affect, CN 2-12 grossly intact, sensory grossly within normal limit, Strength 5/5 in all extremities  Skin: warm     CHRONIC MEDICAL DIAGNOSES:  Principal Problem:    Acute cerebrovascular accident (CVA) ([facility])  Active Problems:    Acute cerebrovascular accident (CVA) due to occlusion of left carotid artery (HCC)    Primary hypertension

## 2024-01-23 NOTE — PROGRESS NOTES
Bedside RN performed patient education and medication education. Discharge concerns initiated and discussed with patient, including clarification on \"who\" assists the patient at their home and instructions for when the home going patient should call their provider after discharge. Opportunity for questions and clarification was provided.      Patient receptive to education:Yes  Patient stated: understanding  Barriers to Education: none  Diagnosis Education given:  Yes    Length of stay: 12  Expected Day of Discharge: 1/23/24  Ask if they have \"Help at Home\" & add to white board?  Yes    Education Day #: 1/23/24    Medication Education Given:  Yes  M in the box Medication name: none    Pt aware of HCAHPS survey: Yes    Stroke Education documented in Patient Education: Yes  Core Measures Documented in Connect [facility]:  Risk Factors: Yes  Warning signs of stroke: Yes  When to Activate 911: Yes  Medication Education for Risk Factors: Yes  Smoking cessation if applicable: Yes  Written Education Given:  Yes    Discharge NIH Completed: Yes  Score: 2    BRAINS: Yes    Follow Up Appointment Made: Yes  Date/Time if applicable: TBD

## 2024-01-23 NOTE — PLAN OF CARE
Problem: Discharge Planning  Goal: Discharge to home or other facility with appropriate resources  1/23/2024 1219 by Grace Gonzalez RN  Outcome: Completed  Flowsheets (Taken 1/23/2024 0800 by Lelia Mooney, RN)  Discharge to home or other facility with appropriate resources: Identify barriers to discharge with patient and caregiver  1/23/2024 0328 by Louise Bacon RN  Outcome: Progressing  Flowsheets (Taken 1/22/2024 2000)  Discharge to home or other facility with appropriate resources:   Identify barriers to discharge with patient and caregiver   Arrange for needed discharge resources and transportation as appropriate   Identify discharge learning needs (meds, wound care, etc)     Problem: Pain  Goal: Verbalizes/displays adequate comfort level or baseline comfort level  1/23/2024 1219 by Grace Gonzalez RN  Outcome: Completed  1/23/2024 0328 by Louise Bacon RN  Outcome: Progressing     Problem: Safety - Adult  Goal: Free from fall injury  1/23/2024 1219 by Grace Gonzalez RN  Outcome: Completed  1/23/2024 0328 by Louise Bacon RN  Outcome: Progressing  Flowsheets (Taken 1/22/2024 2000)  Free From Fall Injury: Instruct family/caregiver on patient safety     Problem: Chronic Conditions and Co-morbidities  Goal: Patient's chronic conditions and co-morbidity symptoms are monitored and maintained or improved  1/23/2024 1219 by Grace Gonzalez RN  Outcome: Completed  Flowsheets (Taken 1/23/2024 0800 by Lelia Mooney, RN)  Care Plan - Patient's Chronic Conditions and Co-Morbidity Symptoms are Monitored and Maintained or Improved: Monitor and assess patient's chronic conditions and comorbid symptoms for stability, deterioration, or improvement  1/23/2024 0328 by Louise Bacon RN  Outcome: Progressing  Flowsheets (Taken 1/22/2024 2000)  Care Plan - Patient's Chronic Conditions and Co-Morbidity Symptoms are Monitored and Maintained or Improved:   Monitor and assess patient's

## 2024-01-30 NOTE — PROGRESS NOTES
Physician Progress Note      PATIENT:               BREANA HAIDER  CSN #:                  [id number]  :                       1950  ADMIT DATE:       2024 7:10 PM  DISCH DATE:        2024 2:30 PM  RESPONDING  PROVIDER #:        Elizabeth Damon PA-C          QUERY TEXT:    KARAN Damon or Dr. Adams,    Patient was admitted with left MCA territory ischemic stroke and underwent TNK   and a thrombectomy. ICU initially noted acute metabolic encephalopathy and   delirium with reported history of early Alzheimer's dementia but subsequently   noted that the pt has dementia and was at baseline with poor short-term   memory. Neurology also noted pt at baseline. Please document additional   clinical indicators to support metabolic encephalopathy or document if   metabolic encephalopathy was ruled out after study.    The medical record reflects the following:    Risk Factors: 73-year-old male with a history of HTN and early Alzheimer's   presented with right sided weakness was found to have had a stroke and was   noted to be confused  Clinical Indicators:  -- ICU documented : \"Acute metabolic encephalopathy - Delirium, reported   history of early Alzheimer's dementia\"  -- Neurology documented : \"Oriented to time, place and person. Speech - no   dysarthria.  Language intact with delayed responses at times. Difficulty with   complex commands.\"  -- Neurointerventional Surgery documented : \"patient is confused\"  -- ICU documented : \"Dementia: -at baseline -poor short term memory\"  -- Neurology documented : \"Oriented to Person, situation, not hospital,   not month or year - baseline.\"  Treatment: Neurology consult, TNK, mechanical thrombectomy, ICU admission &   monitoring, supportive care    Thank-you,  Kevin Onofre RN, CCDS, CRCR  Clinical   [email or web address]  [phone]  You can also contact me via [employer].  Options provided:  -- Delirium with

## 2024-02-14 ENCOUNTER — OFFICE VISIT (OUTPATIENT)
Facility: CLINIC | Age: 74
End: 2024-02-14
Payer: MEDICARE

## 2024-02-14 DIAGNOSIS — G21.8 OTHER SECONDARY PARKINSONISM (HCC): ICD-10-CM

## 2024-02-14 DIAGNOSIS — I63.9 ACUTE CEREBROVASCULAR ACCIDENT (CVA) (HCC): Primary | ICD-10-CM

## 2024-02-14 DIAGNOSIS — F03.C0 SEVERE DEMENTIA WITHOUT BEHAVIORAL DISTURBANCE, PSYCHOTIC DISTURBANCE, MOOD DISTURBANCE, OR ANXIETY, UNSPECIFIED DEMENTIA TYPE (HCC): ICD-10-CM

## 2024-02-14 PROCEDURE — 99306 1ST NF CARE HIGH MDM 50: CPT | Performed by: INTERNAL MEDICINE

## 2024-02-14 PROCEDURE — 1123F ACP DISCUSS/DSCN MKR DOCD: CPT | Performed by: INTERNAL MEDICINE

## 2024-02-14 NOTE — PROGRESS NOTES
tamsulosin prostanoid    Hypertension:  Previously had history of hypertension and required losartan, during hospitalization and rehab he required midodrine for low blood pressure and taken off of losartan.  Continue to monitor    Orthostatic hypotension:  Continue midodrine    Bronchitis/COPD:  No previous history but required bronchodilators continue current treatment.    Full code :  CODE STATUS discussed with dayday Ms. Cotter, patient is full code, due to his diagnosis of COVID in agreement, this person has been appointed as his decision-maker in case of his inability to make any decision.  Will keep this testing updated.          Suhas Alexis MD

## 2024-02-16 ENCOUNTER — OFFICE VISIT (OUTPATIENT)
Facility: CLINIC | Age: 74
End: 2024-02-16
Payer: MEDICARE

## 2024-02-16 DIAGNOSIS — G21.8 OTHER SECONDARY PARKINSONISM (HCC): ICD-10-CM

## 2024-02-16 DIAGNOSIS — I63.9 ACUTE CEREBROVASCULAR ACCIDENT (CVA) (HCC): Primary | ICD-10-CM

## 2024-02-16 DIAGNOSIS — F03.C0 SEVERE DEMENTIA WITHOUT BEHAVIORAL DISTURBANCE, PSYCHOTIC DISTURBANCE, MOOD DISTURBANCE, OR ANXIETY, UNSPECIFIED DEMENTIA TYPE (HCC): ICD-10-CM

## 2024-02-16 PROCEDURE — 99309 SBSQ NF CARE MODERATE MDM 30: CPT | Performed by: INTERNAL MEDICINE

## 2024-02-16 PROCEDURE — 1123F ACP DISCUSS/DSCN MKR DOCD: CPT | Performed by: INTERNAL MEDICINE

## 2024-02-16 NOTE — PROGRESS NOTES
PLACE OF SERVICE:  [address], [place] [address]     SKILLED VISIT    2/16/2024    PLACE OF SERVICE:  [address] [address]     H & P    2/16/2024      CC :  CVA   Dementia   Follow up and monitoring progress , high risk for decompensation       HISTORY OF PRESENT ILLNESS:  Mr. Mohan is a 73-year-old male, past medical history significant for  hypertension, BPH, early onset dementia and hypercholesteremia.  Patient was admitted to the hospital on January 11 for right-sided weakness and severe expressive aphasia.  Patient woke up with the symptoms.  In the hospital his CTA showed left ICA occlusion. Tele-Neuro was consulted, he was deemed to be a candidate for TNK, which was given, he was taken for an emergent mechanical thrombectomy. MRI revealed left MCA territory stroke with occlusion of M2. TTE was unremarkable, although suspicion of embolic source remained. There was no septal defect demonstrable.  Patient was noted for moderate oropharyngeal dysphagia, significant gait and balance problems with bradykinetic movements.  After initial hospitalization he was admitted to [facility] on 1/20/2024.   admitted to skilled nursing facility and further need to explore for assistance as patient has significant cognitive decline/impairment and safety precautions are necessary.  Prior to stroke patient was living with his fiancée, he was diagnosed with early dementia but not started on any treatment.  Patient is poor historian due to significant cognitive impairment.  Answers yes and no to the questions does not give much detail appears spaced out when details are asked does have significant cognitive impairment.  I have met his family at the bedside and went over treatment plans.      Review of systems:  Pertinent positive and negative covered HPI, pulmonary, cardiac, genitourinary negative  PAST MEDICAL HISTORY:  Hypertension,

## 2024-02-21 ENCOUNTER — OFFICE VISIT (OUTPATIENT)
Facility: CLINIC | Age: 74
End: 2024-02-21
Payer: MEDICARE

## 2024-02-21 DIAGNOSIS — F03.C0 SEVERE DEMENTIA WITHOUT BEHAVIORAL DISTURBANCE, PSYCHOTIC DISTURBANCE, MOOD DISTURBANCE, OR ANXIETY, UNSPECIFIED DEMENTIA TYPE (HCC): ICD-10-CM

## 2024-02-21 DIAGNOSIS — G21.8 OTHER SECONDARY PARKINSONISM (HCC): ICD-10-CM

## 2024-02-21 DIAGNOSIS — I63.9 ACUTE CEREBROVASCULAR ACCIDENT (CVA) (HCC): Primary | ICD-10-CM

## 2024-02-21 PROCEDURE — 99309 SBSQ NF CARE MODERATE MDM 30: CPT | Performed by: INTERNAL MEDICINE

## 2024-02-21 PROCEDURE — 1123F ACP DISCUSS/DSCN MKR DOCD: CPT | Performed by: INTERNAL MEDICINE

## 2024-02-21 NOTE — PROGRESS NOTES
dyspnea  Prescriber  W       Code Status: Full code    Allergies: Reviewed  No Known Allergies        Vitals:   /90, HR 67, RR 18, Temp 97        Physical exam:  GENERAL: He is a well-developed, calm and not in acute distress , gives very blank look and flat affect   HEENT: Extraocular with intact,  Pupils equal reactive to light bilaterally  Neuro: Cranial nerves II to XII intact, slight subtle right facial droop under the beard, power 4 /5 bilateral upper and lower extremity, has good handgrip bilateral, plantar equivocal bilaterally, slight rigidity right side, slow gait response, takes long time to stop, Apraxia  LUNGS: Bilateral equal entry, no added sounds   ABDOMEN: Soft nontender nondistended bowel sounds positive   EXTREMITIES: Trace edema. Negative clubbing, cyanosis. Equivocal Homans.  Pulses are intact.  Psych exam:  He is awake, distractible, flat affect, significant cognitive impairment, no insight poor judgment        Assessment/Plans:     ---CVA: Left MCA distribution, resultant right-sided weakness has improved but he has significant cognitive impairment/dementia, he also has associated gait and balance problems.  Being admitted after completion of his recent acute rehab in this facility for SNF OT PT rehabilitation.  Continue aspirin and statins.    ---Apraxia:  Noted his follow-up from acute rehab by physical medicine rehab, he did have some improvement, he still needs to work on OT PT and coordination.  Will monitor response to treatment    --EPS   Symptoms, parkinsonism but usually started after CVA or had some underlying previous symptoms he has been started on amantadine and Sinemet which seems to be helping.  He will need outpatient follow-up with neurology.      /BPH:  Continue current tamsulosin prostanoid    Hypertension:  Previously had history of hypertension and required losartan, during hospitalization and rehab he required midodrine for low blood pressure and taken off of 
Stable

## 2024-02-26 ENCOUNTER — OFFICE VISIT (OUTPATIENT)
Facility: CLINIC | Age: 74
End: 2024-02-26
Payer: MEDICARE

## 2024-02-26 DIAGNOSIS — I63.9 ACUTE CEREBROVASCULAR ACCIDENT (CVA) (HCC): ICD-10-CM

## 2024-02-26 DIAGNOSIS — G21.8 OTHER SECONDARY PARKINSONISM (HCC): ICD-10-CM

## 2024-02-26 DIAGNOSIS — F03.C0 SEVERE DEMENTIA WITHOUT BEHAVIORAL DISTURBANCE, PSYCHOTIC DISTURBANCE, MOOD DISTURBANCE, OR ANXIETY, UNSPECIFIED DEMENTIA TYPE (HCC): ICD-10-CM
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
## 2024-02-26 PROCEDURE — 1123F ACP DISCUSS/DSCN MKR DOCD: CPT | Performed by: INTERNAL MEDICINE

## 2024-02-26 PROCEDURE — 99309 SBSQ NF CARE MODERATE MDM 30: CPT | Performed by: INTERNAL MEDICINE

## 2024-02-27 NOTE — PROGRESS NOTES
Vahe to be here healthcare decision maker.  His prognosis guarded.  He is cognitive impairment recovery is also guarded.  Will continue to update his fiancée.      MD Suhas Elder MD

## 2024-02-29 ENCOUNTER — OFFICE VISIT (OUTPATIENT)
Facility: CLINIC | Age: 74
End: 2024-02-29
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
## 2024-02-29 DIAGNOSIS — G21.8 OTHER SECONDARY PARKINSONISM (HCC): ICD-10-CM

## 2024-02-29 DIAGNOSIS — F03.C0 SEVERE DEMENTIA WITHOUT BEHAVIORAL DISTURBANCE, PSYCHOTIC DISTURBANCE, MOOD DISTURBANCE, OR ANXIETY, UNSPECIFIED DEMENTIA TYPE (HCC): ICD-10-CM

## 2024-02-29 DIAGNOSIS — I63.9 ACUTE CEREBROVASCULAR ACCIDENT (CVA) (HCC): Primary | ICD-10-CM

## 2024-03-01 NOTE — PROGRESS NOTES
PLACE OF SERVICE:  [facility] [address], [place] [address]       SKILLED VISIT    2/29/2024      CC :  Discharge planning  Constipation  Follow-up monitoring progress  CVA  Fall risk for falls  Significant cognitive impairment        HISTORY OF PRESENT ILLNESS:    Mr. Mohan is a 73-year-old male, past medical history significant for  hypertension, BPH, early onset dementia and hypercholesteremia.  Patient was admitted to the hospital on January 11 for right-sided weakness and severe expressive aphasia.  Patient woke up with the symptoms.  In the hospital his CTA showed left ICA occlusion. Tele-Neuro was consulted, he was deemed to be a candidate for TNK, which was given, he was taken for an emergent mechanical thrombectomy. MRI revealed left MCA territory stroke with occlusion of M2. TTE was unremarkable, although suspicion of embolic source remained. There was no septal defect demonstrable.  Patient was noted for moderate oropharyngeal dysphagia, significant gait and balance problems with bradykinetic movements.  After initial hospitalization he was admitted to [facility] on 1/20/2024.   admitted to skilled nursing facility and further need to explore for assistance as patient has significant cognitive decline/impairment and safety precautions are necessary.  Prior to stroke patient was living with his fiancée, he was diagnosed with early dementia but not started on any treatment.  Patient is poor historian due to significant cognitive impairment.  Answers yes and no to the questions does not give much detail appears spaced out when details are asked does have significant cognitive impairment.  I have met his family at the bedside and went over treatment plans.  Discussed progress with RN and OT PT, patient has successfully walked with PT today and for safety requires walker, follows command better than before.  He still is not well-oriented in time place and person partially.

## 2024-03-02 PROCEDURE — 93298 REM INTERROG DEV EVAL SCRMS: CPT | Performed by: INTERNAL MEDICINE

## 2024-03-04 ENCOUNTER — OFFICE VISIT (OUTPATIENT)
Facility: CLINIC | Age: 74
End: 2024-03-04

## 2024-03-04 DIAGNOSIS — F03.C0 SEVERE DEMENTIA WITHOUT BEHAVIORAL DISTURBANCE, PSYCHOTIC DISTURBANCE, MOOD DISTURBANCE, OR ANXIETY, UNSPECIFIED DEMENTIA TYPE (HCC): ICD-10-CM

## 2024-03-04 DIAGNOSIS — G21.8 OTHER SECONDARY PARKINSONISM (HCC): ICD-10-CM

## 2024-03-04 DIAGNOSIS — I63.9 ACUTE CEREBROVASCULAR ACCIDENT (CVA) (HCC): Primary | ICD-10-CM

## 2024-03-07 NOTE — PROGRESS NOTES
Place of Service:  [facility] [address], [place] [address]                                                                      Discharge Summary   3/2/2024      Admit Dx:    CVA   Gen weakness   Gait and mobility disorder  Post CVA bradykinesia   Expressive aphasia   Cognitive impairment /dementia     Date of discaharge : 3/2/2024    Disposition: Home Health Services      Discharge time : > 30 mins    Brief SNF Course:    Mr. Mohan is a 73-year-old male, past medical history significant for  hypertension, BPH, early onset dementia and hypercholesteremia.  Patient was admitted to the hospital on January 11 for right-sided weakness and severe expressive aphasia.  Patient woke up with the symptoms.  In the hospital his CTA showed left ICA occlusion. Tele-Neuro was consulted, he was deemed to be a candidate for TNK, which was given, he was taken for an emergent mechanical thrombectomy. MRI revealed left MCA territory stroke with occlusion of M2. TTE was unremarkable, although suspicion of embolic source remained. There was no septal defect demonstrable.  Patient was noted for moderate oropharyngeal dysphagia, significant gait and balance problems with bradykinetic movements.  After initial hospitalization he was admitted to [facility] on 1/20/2024.  admitted to skilled nursing facility and further need to explore for assistance as patient has significant cognitive decline/impairment and safety precautions are necessary.  Prior to stroke patient was living with his fiancée, he was diagnosed with early dementia but not started on any treatment.  Patient is poor historian due to significant cognitive impairment.  OT PT and rehabilitation.  Patient's overall function has improved significantly have underlying expressive aphasia and significant cognitive impairment but he is easily redirected.  He follows commands and stays cooperative.  Due to expressive aphasia he has limited answers

## 2024-03-12 ENCOUNTER — APPOINTMENT (OUTPATIENT)
Facility: HOSPITAL | Age: 74
DRG: 312 | End: 2024-03-12
Payer: MEDICARE

## 2024-03-12 ENCOUNTER — HOSPITAL ENCOUNTER (INPATIENT)
Facility: HOSPITAL | Age: 74
LOS: 6 days | Discharge: NURSING, REHAB OR LONG-TERM CARE | DRG: 312 | End: 2024-03-19
Attending: STUDENT IN AN ORGANIZED HEALTH CARE EDUCATION/TRAINING PROGRAM | Admitting: STUDENT IN AN ORGANIZED HEALTH CARE EDUCATION/TRAINING PROGRAM
Payer: MEDICARE

## 2024-03-12 DIAGNOSIS — R55 SYNCOPE AND COLLAPSE: Primary | ICD-10-CM

## 2024-03-12 DIAGNOSIS — I21.4 NSTEMI (NON-ST ELEVATED MYOCARDIAL INFARCTION) (HCC): ICD-10-CM

## 2024-03-12 DIAGNOSIS — R41.82 ALTERED MENTAL STATUS, UNSPECIFIED ALTERED MENTAL STATUS TYPE: ICD-10-CM

## 2024-03-12 LAB
ALBUMIN SERPL-MCNC: 3.3 G/DL (ref 3.5–5)
ALBUMIN/GLOB SERPL: 0.8 (ref 1.1–2.2)
ALP SERPL-CCNC: 82 U/L (ref 45–117)
ALT SERPL-CCNC: 39 U/L (ref 12–78)
ANION GAP SERPL CALC-SCNC: 3 MMOL/L (ref 5–15)
APTT PPP: 25.1 SEC (ref 22.1–31)
AST SERPL-CCNC: 14 U/L (ref 15–37)
BASE EXCESS BLDV CALC-SCNC: 3.3 MMOL/L
BASOPHILS # BLD: 0 K/UL (ref 0–0.1)
BASOPHILS NFR BLD: 0 % (ref 0–1)
BILIRUB SERPL-MCNC: 1.6 MG/DL (ref 0.2–1)
BUN SERPL-MCNC: 13 MG/DL (ref 6–20)
BUN/CREAT SERPL: 12 (ref 12–20)
CALCIUM SERPL-MCNC: 9 MG/DL (ref 8.5–10.1)
CHLORIDE SERPL-SCNC: 107 MMOL/L (ref 97–108)
CO2 SERPL-SCNC: 30 MMOL/L (ref 21–32)
CREAT SERPL-MCNC: 1.06 MG/DL (ref 0.7–1.3)
DIFFERENTIAL METHOD BLD: ABNORMAL
EOSINOPHIL # BLD: 0.1 K/UL (ref 0–0.4)
EOSINOPHIL NFR BLD: 2 % (ref 0–7)
ERYTHROCYTE [DISTWIDTH] IN BLOOD BY AUTOMATED COUNT: 12.8 % (ref 11.5–14.5)
FLUAV AG NPH QL IA: NEGATIVE
FLUBV AG NOSE QL IA: NEGATIVE
GLOBULIN SER CALC-MCNC: 4.1 G/DL (ref 2–4)
GLUCOSE SERPL-MCNC: 108 MG/DL (ref 65–100)
HCO3 BLDV-SCNC: 30.9 MMOL/L (ref 23–28)
HCT VFR BLD AUTO: 46.8 % (ref 36.6–50.3)
HGB BLD-MCNC: 14.8 G/DL (ref 12.1–17)
IMM GRANULOCYTES # BLD AUTO: 0 K/UL (ref 0–0.04)
IMM GRANULOCYTES NFR BLD AUTO: 0 % (ref 0–0.5)
INR PPP: 1 (ref 0.9–1.1)
LYMPHOCYTES # BLD: 0.8 K/UL (ref 0.8–3.5)
LYMPHOCYTES NFR BLD: 10 % (ref 12–49)
MCH RBC QN AUTO: 30.3 PG (ref 26–34)
MCHC RBC AUTO-ENTMCNC: 31.6 G/DL (ref 30–36.5)
MCV RBC AUTO: 95.9 FL (ref 80–99)
MONOCYTES # BLD: 0.8 K/UL (ref 0–1)
MONOCYTES NFR BLD: 9 % (ref 5–13)
NEUTS SEG # BLD: 7.1 K/UL (ref 1.8–8)
NEUTS SEG NFR BLD: 80 % (ref 32–75)
NRBC # BLD: 0 K/UL (ref 0–0.01)
NRBC BLD-RTO: 0 PER 100 WBC
PCO2 BLDV: 56.6 MMHG (ref 41–51)
PH BLDV: 7.35 (ref 7.32–7.42)
PLATELET # BLD AUTO: 155 K/UL (ref 150–400)
PMV BLD AUTO: 9.7 FL (ref 8.9–12.9)
PO2 BLDV: <27 MMHG (ref 25–40)
POTASSIUM SERPL-SCNC: 4 MMOL/L (ref 3.5–5.1)
PROT SERPL-MCNC: 7.4 G/DL (ref 6.4–8.2)
PROTHROMBIN TIME: 10.9 SEC (ref 9–11.1)
RBC # BLD AUTO: 4.88 M/UL (ref 4.1–5.7)
SARS-COV-2 RDRP RESP QL NAA+PROBE: NOT DETECTED
SERVICE CMNT-IMP: ABNORMAL
SODIUM SERPL-SCNC: 140 MMOL/L (ref 136–145)
SOURCE: NORMAL
SPECIMEN TYPE: ABNORMAL
THERAPEUTIC RANGE: NORMAL SECS (ref 58–77)
TROPONIN I SERPL HS-MCNC: 210 NG/L (ref 0–76)
UFH PPP CHRO-ACNC: <0.1 IU/ML
WBC # BLD AUTO: 8.9 K/UL (ref 4.1–11.1)

## 2024-03-12 PROCEDURE — 85025 COMPLETE CBC W/AUTO DIFF WBC: CPT

## 2024-03-12 PROCEDURE — 36415 COLL VENOUS BLD VENIPUNCTURE: CPT

## 2024-03-12 PROCEDURE — 85610 PROTHROMBIN TIME: CPT

## 2024-03-12 PROCEDURE — 70450 CT HEAD/BRAIN W/O DYE: CPT

## 2024-03-12 PROCEDURE — 96374 THER/PROPH/DIAG INJ IV PUSH: CPT

## 2024-03-12 PROCEDURE — 6370000000 HC RX 637 (ALT 250 FOR IP): Performed by: STUDENT IN AN ORGANIZED HEALTH CARE EDUCATION/TRAINING PROGRAM

## 2024-03-12 PROCEDURE — 84484 ASSAY OF TROPONIN QUANT: CPT

## 2024-03-12 PROCEDURE — 80053 COMPREHEN METABOLIC PANEL: CPT

## 2024-03-12 PROCEDURE — 85730 THROMBOPLASTIN TIME PARTIAL: CPT

## 2024-03-12 PROCEDURE — 82803 BLOOD GASES ANY COMBINATION: CPT

## 2024-03-12 PROCEDURE — 93005 ELECTROCARDIOGRAM TRACING: CPT | Performed by: STUDENT IN AN ORGANIZED HEALTH CARE EDUCATION/TRAINING PROGRAM

## 2024-03-12 PROCEDURE — 71045 X-RAY EXAM CHEST 1 VIEW: CPT

## 2024-03-12 PROCEDURE — 6360000002 HC RX W HCPCS: Performed by: STUDENT IN AN ORGANIZED HEALTH CARE EDUCATION/TRAINING PROGRAM

## 2024-03-12 PROCEDURE — 87804 INFLUENZA ASSAY W/OPTIC: CPT

## 2024-03-12 PROCEDURE — 87635 SARS-COV-2 COVID-19 AMP PRB: CPT

## 2024-03-12 PROCEDURE — 85520 HEPARIN ASSAY: CPT

## 2024-03-12 PROCEDURE — 99285 EMERGENCY DEPT VISIT HI MDM: CPT

## 2024-03-12 RX ORDER — HEPARIN SODIUM 1000 [USP'U]/ML
4000 INJECTION, SOLUTION INTRAVENOUS; SUBCUTANEOUS ONCE
Status: COMPLETED | OUTPATIENT
Start: 2024-03-12 | End: 2024-03-12

## 2024-03-12 RX ORDER — HEPARIN SODIUM 1000 [USP'U]/ML
4000 INJECTION, SOLUTION INTRAVENOUS; SUBCUTANEOUS PRN
Status: DISCONTINUED | OUTPATIENT
Start: 2024-03-12 | End: 2024-03-13

## 2024-03-12 RX ORDER — HEPARIN SODIUM 1000 [USP'U]/ML
2000 INJECTION, SOLUTION INTRAVENOUS; SUBCUTANEOUS PRN
Status: DISCONTINUED | OUTPATIENT
Start: 2024-03-12 | End: 2024-03-13

## 2024-03-12 RX ORDER — ASPIRIN 81 MG/1
324 TABLET, CHEWABLE ORAL ONCE
Status: COMPLETED | OUTPATIENT
Start: 2024-03-12 | End: 2024-03-12

## 2024-03-12 RX ORDER — HEPARIN SODIUM 10000 [USP'U]/100ML
12-30 INJECTION, SOLUTION INTRAVENOUS CONTINUOUS
Status: DISCONTINUED | OUTPATIENT
Start: 2024-03-12 | End: 2024-03-13

## 2024-03-12 RX ADMIN — Medication 12 UNITS/KG/HR: at 22:39

## 2024-03-12 RX ADMIN — ASPIRIN 324 MG: 81 TABLET, CHEWABLE ORAL at 22:31

## 2024-03-12 RX ADMIN — HEPARIN SODIUM 4000 UNITS: 1000 INJECTION INTRAVENOUS; SUBCUTANEOUS at 22:32

## 2024-03-12 ASSESSMENT — PAIN - FUNCTIONAL ASSESSMENT
PAIN_FUNCTIONAL_ASSESSMENT: NONE - DENIES PAIN
PAIN_FUNCTIONAL_ASSESSMENT: NONE - DENIES PAIN

## 2024-03-12 ASSESSMENT — LIFESTYLE VARIABLES
HOW MANY STANDARD DRINKS CONTAINING ALCOHOL DO YOU HAVE ON A TYPICAL DAY: PATIENT DOES NOT DRINK
HOW OFTEN DO YOU HAVE A DRINK CONTAINING ALCOHOL: NEVER

## 2024-03-13 ENCOUNTER — APPOINTMENT (OUTPATIENT)
Facility: HOSPITAL | Age: 74
DRG: 312 | End: 2024-03-13
Payer: MEDICARE

## 2024-03-13 PROBLEM — R55 SYNCOPE AND COLLAPSE: Status: ACTIVE | Noted: 2024-03-13

## 2024-03-13 LAB
ALBUMIN SERPL-MCNC: 3 G/DL (ref 3.5–5)
ALBUMIN/GLOB SERPL: 0.8 (ref 1.1–2.2)
ALP SERPL-CCNC: 74 U/L (ref 45–117)
ALT SERPL-CCNC: 32 U/L (ref 12–78)
ANION GAP SERPL CALC-SCNC: 1 MMOL/L (ref 5–15)
AST SERPL-CCNC: 12 U/L (ref 15–37)
BILIRUB SERPL-MCNC: 1.2 MG/DL (ref 0.2–1)
BUN SERPL-MCNC: 11 MG/DL (ref 6–20)
BUN/CREAT SERPL: 11 (ref 12–20)
CALCIUM SERPL-MCNC: 9.1 MG/DL (ref 8.5–10.1)
CHLORIDE SERPL-SCNC: 108 MMOL/L (ref 97–108)
CK SERPL-CCNC: 104 U/L (ref 39–308)
CO2 SERPL-SCNC: 29 MMOL/L (ref 21–32)
CREAT SERPL-MCNC: 1.01 MG/DL (ref 0.7–1.3)
EKG ATRIAL RATE: 93 BPM
EKG DIAGNOSIS: NORMAL
EKG P AXIS: 56 DEGREES
EKG P-R INTERVAL: 130 MS
EKG Q-T INTERVAL: 338 MS
EKG QRS DURATION: 68 MS
EKG QTC CALCULATION (BAZETT): 420 MS
EKG R AXIS: 37 DEGREES
EKG T AXIS: 41 DEGREES
EKG VENTRICULAR RATE: 93 BPM
GLOBULIN SER CALC-MCNC: 4 G/DL (ref 2–4)
GLUCOSE SERPL-MCNC: 90 MG/DL (ref 65–100)
POTASSIUM SERPL-SCNC: 4.5 MMOL/L (ref 3.5–5.1)
PROT SERPL-MCNC: 7 G/DL (ref 6.4–8.2)
SODIUM SERPL-SCNC: 138 MMOL/L (ref 136–145)
TROPONIN I SERPL HS-MCNC: 218 NG/L (ref 0–76)
TROPONIN I SERPL HS-MCNC: 434 NG/L (ref 0–76)
UFH PPP CHRO-ACNC: 0.46 IU/ML
UFH PPP CHRO-ACNC: 0.63 IU/ML

## 2024-03-13 PROCEDURE — 6360000004 HC RX CONTRAST MEDICATION: Performed by: GENERAL ACUTE CARE HOSPITAL

## 2024-03-13 PROCEDURE — 75574 CT ANGIO HRT W/3D IMAGE: CPT

## 2024-03-13 PROCEDURE — 97166 OT EVAL MOD COMPLEX 45 MIN: CPT | Performed by: OCCUPATIONAL THERAPIST

## 2024-03-13 PROCEDURE — 75574 CT ANGIO HRT W/3D IMAGE: CPT | Performed by: INTERNAL MEDICINE

## 2024-03-13 PROCEDURE — 6360000002 HC RX W HCPCS: Performed by: STUDENT IN AN ORGANIZED HEALTH CARE EDUCATION/TRAINING PROGRAM

## 2024-03-13 PROCEDURE — 97116 GAIT TRAINING THERAPY: CPT

## 2024-03-13 PROCEDURE — 97530 THERAPEUTIC ACTIVITIES: CPT

## 2024-03-13 PROCEDURE — 2500000003 HC RX 250 WO HCPCS: Performed by: STUDENT IN AN ORGANIZED HEALTH CARE EDUCATION/TRAINING PROGRAM

## 2024-03-13 PROCEDURE — 82550 ASSAY OF CK (CPK): CPT

## 2024-03-13 PROCEDURE — 36415 COLL VENOUS BLD VENIPUNCTURE: CPT

## 2024-03-13 PROCEDURE — 2580000003 HC RX 258: Performed by: STUDENT IN AN ORGANIZED HEALTH CARE EDUCATION/TRAINING PROGRAM

## 2024-03-13 PROCEDURE — 6370000000 HC RX 637 (ALT 250 FOR IP): Performed by: STUDENT IN AN ORGANIZED HEALTH CARE EDUCATION/TRAINING PROGRAM

## 2024-03-13 PROCEDURE — 6370000000 HC RX 637 (ALT 250 FOR IP): Performed by: GENERAL ACUTE CARE HOSPITAL

## 2024-03-13 PROCEDURE — 85520 HEPARIN ASSAY: CPT

## 2024-03-13 PROCEDURE — 97163 PT EVAL HIGH COMPLEX 45 MIN: CPT

## 2024-03-13 PROCEDURE — 94640 AIRWAY INHALATION TREATMENT: CPT

## 2024-03-13 PROCEDURE — 80053 COMPREHEN METABOLIC PANEL: CPT

## 2024-03-13 PROCEDURE — 1100000003 HC PRIVATE W/ TELEMETRY

## 2024-03-13 PROCEDURE — 97535 SELF CARE MNGMENT TRAINING: CPT | Performed by: OCCUPATIONAL THERAPIST

## 2024-03-13 PROCEDURE — 84484 ASSAY OF TROPONIN QUANT: CPT

## 2024-03-13 RX ORDER — SODIUM CHLORIDE 0.9 % (FLUSH) 0.9 %
5-40 SYRINGE (ML) INJECTION EVERY 12 HOURS SCHEDULED
Status: DISCONTINUED | OUTPATIENT
Start: 2024-03-13 | End: 2024-03-19 | Stop reason: HOSPADM

## 2024-03-13 RX ORDER — ACETAMINOPHEN 325 MG/1
650 TABLET ORAL EVERY 6 HOURS PRN
Status: DISCONTINUED | OUTPATIENT
Start: 2024-03-13 | End: 2024-03-19 | Stop reason: HOSPADM

## 2024-03-13 RX ORDER — SODIUM CHLORIDE 0.9 % (FLUSH) 0.9 %
5-40 SYRINGE (ML) INJECTION PRN
Status: DISCONTINUED | OUTPATIENT
Start: 2024-03-13 | End: 2024-03-19 | Stop reason: HOSPADM

## 2024-03-13 RX ORDER — ERGOCALCIFEROL 1.25 MG/1
50000 CAPSULE ORAL WEEKLY
COMMUNITY

## 2024-03-13 RX ORDER — LANOLIN ALCOHOL/MO/W.PET/CERES
3 CREAM (GRAM) TOPICAL NIGHTLY
Status: DISCONTINUED | OUTPATIENT
Start: 2024-03-13 | End: 2024-03-19 | Stop reason: HOSPADM

## 2024-03-13 RX ORDER — ATORVASTATIN CALCIUM 40 MG/1
40 TABLET, FILM COATED ORAL NIGHTLY
Status: DISCONTINUED | OUTPATIENT
Start: 2024-03-13 | End: 2024-03-19 | Stop reason: HOSPADM

## 2024-03-13 RX ORDER — METOPROLOL TARTRATE 1 MG/ML
5 INJECTION, SOLUTION INTRAVENOUS EVERY 5 MIN PRN
Status: ACTIVE | OUTPATIENT
Start: 2024-03-13 | End: 2024-03-13

## 2024-03-13 RX ORDER — SODIUM CHLORIDE 9 MG/ML
125 INJECTION, SOLUTION INTRAVENOUS ONCE
Status: DISCONTINUED | OUTPATIENT
Start: 2024-03-13 | End: 2024-03-13

## 2024-03-13 RX ORDER — ONDANSETRON 2 MG/ML
4 INJECTION INTRAMUSCULAR; INTRAVENOUS EVERY 6 HOURS PRN
Status: DISCONTINUED | OUTPATIENT
Start: 2024-03-13 | End: 2024-03-19 | Stop reason: HOSPADM

## 2024-03-13 RX ORDER — NITROGLYCERIN 0.4 MG/1
0.4 TABLET SUBLINGUAL ONCE AS NEEDED
Status: DISCONTINUED | OUTPATIENT
Start: 2024-03-13 | End: 2024-03-19 | Stop reason: HOSPADM

## 2024-03-13 RX ORDER — ENOXAPARIN SODIUM 100 MG/ML
40 INJECTION SUBCUTANEOUS DAILY
Status: DISCONTINUED | OUTPATIENT
Start: 2024-03-13 | End: 2024-03-19 | Stop reason: HOSPADM

## 2024-03-13 RX ORDER — ASPIRIN 81 MG/1
81 TABLET, CHEWABLE ORAL DAILY
Status: DISCONTINUED | OUTPATIENT
Start: 2024-03-13 | End: 2024-03-19 | Stop reason: HOSPADM

## 2024-03-13 RX ORDER — NITROGLYCERIN 0.4 MG/1
0.8 TABLET SUBLINGUAL ONCE
Status: COMPLETED | OUTPATIENT
Start: 2024-03-13 | End: 2024-03-13

## 2024-03-13 RX ORDER — TAMSULOSIN HYDROCHLORIDE 0.4 MG/1
0.4 CAPSULE ORAL DAILY
Status: DISCONTINUED | OUTPATIENT
Start: 2024-03-13 | End: 2024-03-19 | Stop reason: HOSPADM

## 2024-03-13 RX ORDER — SODIUM CHLORIDE 9 MG/ML
INJECTION, SOLUTION INTRAVENOUS PRN
Status: DISCONTINUED | OUTPATIENT
Start: 2024-03-13 | End: 2024-03-19 | Stop reason: HOSPADM

## 2024-03-13 RX ORDER — POLYETHYLENE GLYCOL 3350 17 G/17G
17 POWDER, FOR SOLUTION ORAL DAILY PRN
Status: DISCONTINUED | OUTPATIENT
Start: 2024-03-13 | End: 2024-03-19 | Stop reason: HOSPADM

## 2024-03-13 RX ORDER — FINASTERIDE 5 MG/1
5 TABLET, FILM COATED ORAL DAILY
Status: DISCONTINUED | OUTPATIENT
Start: 2024-03-13 | End: 2024-03-19 | Stop reason: HOSPADM

## 2024-03-13 RX ORDER — ONDANSETRON 4 MG/1
4 TABLET, ORALLY DISINTEGRATING ORAL EVERY 8 HOURS PRN
Status: DISCONTINUED | OUTPATIENT
Start: 2024-03-13 | End: 2024-03-19 | Stop reason: HOSPADM

## 2024-03-13 RX ORDER — ACETAMINOPHEN 325 MG/1
650 TABLET ORAL EVERY 4 HOURS PRN
Status: DISCONTINUED | OUTPATIENT
Start: 2024-03-13 | End: 2024-03-13

## 2024-03-13 RX ORDER — ACETAMINOPHEN 650 MG/1
650 SUPPOSITORY RECTAL EVERY 6 HOURS PRN
Status: DISCONTINUED | OUTPATIENT
Start: 2024-03-13 | End: 2024-03-19 | Stop reason: HOSPADM

## 2024-03-13 RX ADMIN — TAMSULOSIN HYDROCHLORIDE 0.4 MG: 0.4 CAPSULE ORAL at 08:52

## 2024-03-13 RX ADMIN — IOPAMIDOL 100 ML: 755 INJECTION, SOLUTION INTRAVENOUS at 15:30

## 2024-03-13 RX ADMIN — SODIUM CHLORIDE, PRESERVATIVE FREE 10 ML: 5 INJECTION INTRAVENOUS at 20:37

## 2024-03-13 RX ADMIN — ASPIRIN 81 MG: 81 TABLET, CHEWABLE ORAL at 08:52

## 2024-03-13 RX ADMIN — ARFORMOTEROL TARTRATE: 15 SOLUTION RESPIRATORY (INHALATION) at 08:12

## 2024-03-13 RX ADMIN — ENOXAPARIN SODIUM 40 MG: 100 INJECTION SUBCUTANEOUS at 20:36

## 2024-03-13 RX ADMIN — METOPROLOL TARTRATE 12.5 MG: 25 TABLET, FILM COATED ORAL at 12:10

## 2024-03-13 RX ADMIN — ATORVASTATIN CALCIUM 40 MG: 40 TABLET, FILM COATED ORAL at 20:36

## 2024-03-13 RX ADMIN — FINASTERIDE 5 MG: 5 TABLET, FILM COATED ORAL at 08:52

## 2024-03-13 RX ADMIN — MELATONIN 3 MG: at 20:36

## 2024-03-13 RX ADMIN — METOPROLOL TARTRATE 5 MG: 5 INJECTION INTRAVENOUS at 14:30

## 2024-03-13 RX ADMIN — METOPROLOL TARTRATE 5 MG: 5 INJECTION INTRAVENOUS at 14:40

## 2024-03-13 RX ADMIN — METOPROLOL TARTRATE 5 MG: 5 INJECTION INTRAVENOUS at 14:35

## 2024-03-13 RX ADMIN — METOPROLOL TARTRATE 5 MG: 5 INJECTION INTRAVENOUS at 14:24

## 2024-03-13 RX ADMIN — METOPROLOL TARTRATE 12.5 MG: 25 TABLET, FILM COATED ORAL at 20:36

## 2024-03-13 RX ADMIN — NITROGLYCERIN 0.8 MG: 0.4 TABLET, ORALLY DISINTEGRATING SUBLINGUAL at 17:40

## 2024-03-13 ASSESSMENT — PAIN - FUNCTIONAL ASSESSMENT: PAIN_FUNCTIONAL_ASSESSMENT: NONE - DENIES PAIN

## 2024-03-13 NOTE — H&P
Hospitalist Admission Note    NAME:  Cale Mohan   :  1950   MRN:  [id number]     Date/Time:  3/13/2024 3:59 AM    Patient PCP: None, None    ______________________________________________________________________  Given the patient's current clinical presentation, I have a high level of concern for decompensation if discharged from the emergency department.  Complex decision making was performed, which includes reviewing the patient's available past medical records, laboratory results, and x-ray films.       My assessment of this patient's clinical condition and my plan of care is as follows.    Assessment / Plan:    Active Problems:  Syncope and collapse-concern for cardiac syncope  Elevated troponin of unclear significance  History of cryptogenic CVA  Essential hypertension  Hyperlipidemia  Underlying dementia-alert to self at baseline per chart documentation  Reactive airway disease  BPH    Plan:  Syncope and collapse-concern for cardiac syncope  Elevated troponin of unclear significance  History of cryptogenic CVA  Essential hypertension  Hyperlipidemia  Admit to telemetry monitoring  Cardiology consulted, greatly appreciate their expertise  Trend troponin  ACS heparin  Continue PTA aspirin  Continue PTA atorvastatin  Defer repeat TTE to cardiology preference-last performed 2 months prior    Underlying dementia-alert to self at baseline per chart documentation  Monitor for signs of delirium  Melatonin nightly    Reactive airway disease  Formulary substitution Dulera    BPH  Bladder management protocol  Continue PTA finasteride and Flomax    Medical Decision Making:   I personally reviewed labs: Yes, as listed below  I personally reviewed imaging: CT head, CXR  Toxic drug monitoring: None  Discussed case with: ED provider. After discussion I am in agreement that acuity of patient's medical condition necessitates hospital stay.      Code Status: Full  DVT Prophylaxis: Heparin  GI Prophylaxis: Not

## 2024-03-13 NOTE — ED PROVIDER NOTES
[facility] EMERGENCY DEPT  EMERGENCY DEPARTMENT ENCOUNTER       Pt Name: Cale Mohan  MRN: [id number]  Birthdate 1950  Date of evaluation: 3/12/2024  Provider: Maxime Tadeo MD   PCP: None, None  Note Started: 10:13 PM EDT 3/12/24     CHIEF COMPLAINT       Chief Complaint   Patient presents with    Loss of Consciousness     Arrived via [facility] after a syncope episode that lasted 3/4 minutes. Confused at baseline and a hx of a CVA with balance deficits. Blood sugar at scene was 144.      HISTORY OF PRESENT ILLNESS: 1 or more elements      History From: EMS report, History limited by: dementia/AMS     Cale Mohan is a 73 y.o. male w hx fo CVA with residual balance deficits, dementia who presents to the ED after a syncopal episode.  According to family he was in his wheelchair when he lost consciousness for 3-5 minutes.  He denies any complaints currently.         Please See MDM for Additional Details of the HPI/PMH  Nursing Notes were all reviewed and agreed with or any disagreements were addressed in the HPI.     REVIEW OF SYSTEMS        Positives and Pertinent negatives as per HPI.    PAST HISTORY     Past Medical History:  No past medical history on file.    Past Surgical History:  Past Surgical History:   Procedure Laterality Date    EP DEVICE PROCEDURE N/A 1/15/2024    Loop recorder insert performed by Sherly Latham MD at [facility] CARDIAC CATH LAB    IR MECHANICAL ART THROMBECTOMY INTRACRANIAL  1/11/2024    IR MECHANICAL ART THROMBECTOMY INTRACRANIAL 1/11/2024 [facility] RAD ANGIO IR       Family History:  No family history on file.    Social History:  Social History     Tobacco Use    Smoking status: Former     Current packs/day: 0.00     Types: Cigarettes     Quit date: 2014     Years since quitting: 10.2       Allergies:  No Known Allergies    CURRENT MEDICATIONS      Previous Medications    ASPIRIN 81 MG CHEWABLE TABLET    Take 1 tablet by mouth daily    ATORVASTATIN (LIPITOR) 40 MG TABLET    Take 1 tablet by mouth

## 2024-03-13 NOTE — ED NOTES
Wife called and notified of the patient's admission status. She advised that she will be in tomorrow to see him. She also advised that we can call her at any time during the night if we need.

## 2024-03-13 NOTE — CONSULTS
[facility]  [address] [place] [address]  [phone]  [email or web address]       CARDIOLOGY CONSULTATION       Date of  Admission: 3/12/2024  7:27 PM     Admission type:Emergency   Primary Care Physician:None, None     Attending Provider: Silas Gant MD  Cardiology Provider:   CC/REASON FOR CONSULT: Elevated troponin     Subjective:   73-year-old patient with elevate troponin    Essential hypertension  Early onset dementia  History of stroke  Benign prostate hyperplasia  Dyslipidemia    The patient was seen and examined at the bedside.  He reports no chest pain or shortness of breath.  Denies any other complaints to me.      Patient Active Problem List    Diagnosis Date Noted    Syncope and collapse 03/13/2024    Acute cerebrovascular accident (CVA) due to occlusion of left carotid artery (HCC) 01/12/2024    Primary hypertension 01/12/2024    Hyperlipidemia 01/12/2024    Acute cerebrovascular accident (CVA) (HCC) 01/11/2024      None, None  No past medical history on file.   Social History     Socioeconomic History    Marital status: Single   Tobacco Use    Smoking status: Former     Current packs/day: 0.00     Types: Cigarettes     Quit date: 2014     Years since quitting: 10.2   Substance and Sexual Activity    Sexual activity: Not Currently     Social Determinants of Health     Food Insecurity: No Food Insecurity (1/13/2024)    Hunger Vital Sign     Worried About Running Out of Food in the Last Year: Never true     Ran Out of Food in the Last Year: Never true   Transportation Needs: No Transportation Needs (1/13/2024)    PRAPARE - Transportation     Lack of Transportation (Medical): No     Lack of Transportation (Non-Medical): No   Housing Stability: High Risk (1/13/2024)    Housing Stability Vital Sign     Unable to Pay for Housing in the Last Year: No     Number of Places Lived in the Last Year: 0     Unstable Housing in the Last Year: Yes     No

## 2024-03-13 NOTE — ED NOTES
TRANSFER - OUT REPORT:    Verbal report given to REGIS Brady on Cale Mohan  being transferred to 2121 for routine progression of patient care       Report consisted of patient's Situation, Background, Assessment and   Recommendations(SBAR).     Information from the following report(s) Nurse Handoff Report, ED Encounter Summary, ED SBAR, and MAR was reviewed with the receiving nurse.    [place] Fall Assessment:    Presents to emergency department  because of falls (Syncope, seizure, or loss of consciousness): Yes  Age > 70: Yes  Altered Mental Status, Intoxication with alcohol or substance confusion (Disorientation, impaired judgment, poor safety awaremess, or inability to follow instructions): Yes  Impaired Mobility: Ambulates or transfers with assistive devices or assistance; Unable to ambulate or transer.: Yes  Nursing Judgement: Yes          Lines:   Peripheral IV 03/12/24 Right;Dorsal Hand (Active)       Peripheral IV 03/12/24 Left;Inferior Forearm (Active)   Site Assessment Clean, dry & intact 03/12/24 2058   Line Status Blood return noted;Flushed;Specimen collected 03/12/24 2058   Phlebitis Assessment No symptoms 03/12/24 2058   Infiltration Assessment 0 03/12/24 2058   Alcohol Cap Used No 03/12/24 2058   Dressing Status Clean, dry & intact 03/12/24 2058   Dressing Type Transparent 03/12/24 2058        Opportunity for questions and clarification was provided.      Patient transported with:  Monitor and Tech

## 2024-03-14 ENCOUNTER — APPOINTMENT (OUTPATIENT)
Facility: HOSPITAL | Age: 74
DRG: 312 | End: 2024-03-14
Payer: MEDICARE

## 2024-03-14 PROBLEM — R55 CONVULSIVE SYNCOPE: Status: ACTIVE | Noted: 2024-03-14

## 2024-03-14 PROBLEM — R41.82 ACUTE ALTERATION IN MENTAL STATUS: Status: ACTIVE | Noted: 2024-03-14

## 2024-03-14 LAB
ALBUMIN SERPL-MCNC: 2.8 G/DL (ref 3.5–5)
ALBUMIN/GLOB SERPL: 0.7 (ref 1.1–2.2)
ALP SERPL-CCNC: 69 U/L (ref 45–117)
ALT SERPL-CCNC: 30 U/L (ref 12–78)
ANION GAP SERPL CALC-SCNC: 3 MMOL/L (ref 5–15)
AST SERPL-CCNC: 12 U/L (ref 15–37)
BASOPHILS # BLD: 0 K/UL (ref 0–0.1)
BASOPHILS NFR BLD: 0 % (ref 0–1)
BILIRUB DIRECT SERPL-MCNC: 0.2 MG/DL (ref 0–0.2)
BILIRUB SERPL-MCNC: 0.9 MG/DL (ref 0.2–1)
BUN SERPL-MCNC: 13 MG/DL (ref 6–20)
BUN/CREAT SERPL: 13 (ref 12–20)
CALCIUM SERPL-MCNC: 9 MG/DL (ref 8.5–10.1)
CHLORIDE SERPL-SCNC: 111 MMOL/L (ref 97–108)
CK SERPL-CCNC: 87 U/L (ref 39–308)
CO2 SERPL-SCNC: 26 MMOL/L (ref 21–32)
CREAT SERPL-MCNC: 1.03 MG/DL (ref 0.7–1.3)
DIFFERENTIAL METHOD BLD: ABNORMAL
EOSINOPHIL # BLD: 0.2 K/UL (ref 0–0.4)
EOSINOPHIL NFR BLD: 3 % (ref 0–7)
ERYTHROCYTE [DISTWIDTH] IN BLOOD BY AUTOMATED COUNT: 12.6 % (ref 11.5–14.5)
GLOBULIN SER CALC-MCNC: 3.8 G/DL (ref 2–4)
GLUCOSE SERPL-MCNC: 88 MG/DL (ref 65–100)
HCT VFR BLD AUTO: 43.4 % (ref 36.6–50.3)
HGB BLD-MCNC: 13.8 G/DL (ref 12.1–17)
IMM GRANULOCYTES # BLD AUTO: 0 K/UL (ref 0–0.04)
IMM GRANULOCYTES NFR BLD AUTO: 1 % (ref 0–0.5)
LYMPHOCYTES # BLD: 1.7 K/UL (ref 0.8–3.5)
LYMPHOCYTES NFR BLD: 28 % (ref 12–49)
MCH RBC QN AUTO: 30.4 PG (ref 26–34)
MCHC RBC AUTO-ENTMCNC: 31.8 G/DL (ref 30–36.5)
MCV RBC AUTO: 95.6 FL (ref 80–99)
MONOCYTES # BLD: 0.7 K/UL (ref 0–1)
MONOCYTES NFR BLD: 11 % (ref 5–13)
NEUTS SEG # BLD: 3.6 K/UL (ref 1.8–8)
NEUTS SEG NFR BLD: 57 % (ref 32–75)
NRBC # BLD: 0 K/UL (ref 0–0.01)
NRBC BLD-RTO: 0 PER 100 WBC
PLATELET # BLD AUTO: 148 K/UL (ref 150–400)
PMV BLD AUTO: 10.3 FL (ref 8.9–12.9)
POTASSIUM SERPL-SCNC: 4 MMOL/L (ref 3.5–5.1)
PROT SERPL-MCNC: 6.6 G/DL (ref 6.4–8.2)
RBC # BLD AUTO: 4.54 M/UL (ref 4.1–5.7)
SODIUM SERPL-SCNC: 140 MMOL/L (ref 136–145)
WBC # BLD AUTO: 6.2 K/UL (ref 4.1–11.1)

## 2024-03-14 PROCEDURE — 95819 EEG AWAKE AND ASLEEP: CPT | Performed by: PSYCHIATRY & NEUROLOGY

## 2024-03-14 PROCEDURE — 75580 N-INVAS EST C FFR SW ALY CTA: CPT

## 2024-03-14 PROCEDURE — 80076 HEPATIC FUNCTION PANEL: CPT

## 2024-03-14 PROCEDURE — 2580000003 HC RX 258: Performed by: STUDENT IN AN ORGANIZED HEALTH CARE EDUCATION/TRAINING PROGRAM

## 2024-03-14 PROCEDURE — 6360000002 HC RX W HCPCS: Performed by: STUDENT IN AN ORGANIZED HEALTH CARE EDUCATION/TRAINING PROGRAM

## 2024-03-14 PROCEDURE — 94640 AIRWAY INHALATION TREATMENT: CPT

## 2024-03-14 PROCEDURE — 85025 COMPLETE CBC W/AUTO DIFF WBC: CPT

## 2024-03-14 PROCEDURE — 6370000000 HC RX 637 (ALT 250 FOR IP): Performed by: GENERAL ACUTE CARE HOSPITAL

## 2024-03-14 PROCEDURE — 1100000003 HC PRIVATE W/ TELEMETRY

## 2024-03-14 PROCEDURE — 36415 COLL VENOUS BLD VENIPUNCTURE: CPT

## 2024-03-14 PROCEDURE — 75580 N-INVAS EST C FFR SW ALY CTA: CPT | Performed by: INTERNAL MEDICINE

## 2024-03-14 PROCEDURE — 82550 ASSAY OF CK (CPK): CPT

## 2024-03-14 PROCEDURE — 6370000000 HC RX 637 (ALT 250 FOR IP): Performed by: STUDENT IN AN ORGANIZED HEALTH CARE EDUCATION/TRAINING PROGRAM

## 2024-03-14 PROCEDURE — 97530 THERAPEUTIC ACTIVITIES: CPT | Performed by: OCCUPATIONAL THERAPIST

## 2024-03-14 PROCEDURE — 97530 THERAPEUTIC ACTIVITIES: CPT

## 2024-03-14 PROCEDURE — 80048 BASIC METABOLIC PNL TOTAL CA: CPT

## 2024-03-14 PROCEDURE — 95816 EEG AWAKE AND DROWSY: CPT

## 2024-03-14 RX ADMIN — SODIUM CHLORIDE, PRESERVATIVE FREE 10 ML: 5 INJECTION INTRAVENOUS at 21:13

## 2024-03-14 RX ADMIN — MELATONIN 3 MG: at 21:13

## 2024-03-14 RX ADMIN — ENOXAPARIN SODIUM 40 MG: 100 INJECTION SUBCUTANEOUS at 21:12

## 2024-03-14 RX ADMIN — ATORVASTATIN CALCIUM 40 MG: 40 TABLET, FILM COATED ORAL at 21:13

## 2024-03-14 RX ADMIN — METOPROLOL TARTRATE 12.5 MG: 25 TABLET, FILM COATED ORAL at 08:53

## 2024-03-14 RX ADMIN — SODIUM CHLORIDE, PRESERVATIVE FREE 10 ML: 5 INJECTION INTRAVENOUS at 08:54

## 2024-03-14 RX ADMIN — METOPROLOL TARTRATE 12.5 MG: 25 TABLET, FILM COATED ORAL at 21:13

## 2024-03-14 RX ADMIN — TAMSULOSIN HYDROCHLORIDE 0.4 MG: 0.4 CAPSULE ORAL at 08:54

## 2024-03-14 RX ADMIN — ARFORMOTEROL TARTRATE: 15 SOLUTION RESPIRATORY (INHALATION) at 07:32

## 2024-03-14 RX ADMIN — ASPIRIN 81 MG: 81 TABLET, CHEWABLE ORAL at 08:54

## 2024-03-14 RX ADMIN — FINASTERIDE 5 MG: 5 TABLET, FILM COATED ORAL at 08:54

## 2024-03-14 NOTE — PROCEDURES
[facility]              [address] [place] [address]                                   EEG      PATIENT NAME: BREANA HAIDER                : 1950  MED REC NO: [id number]                       ROOM: 2121  ACCOUNT NO: [id number]                       ADMIT DATE: 2024  PROVIDER: Tyrone Onofre MD    DATE OF SERVICE:  2024    CLINICAL INDICATION:  The patient is a 73-year-old male with a history of dementia and sudden passing-out spell.  EEG to rule out seizures, rule out cortical abnormality, rule out epilepsy.    EEG CLASSIFICATION:  Dysrhythmia grade 1, generalized.    DESCRIPTION OF RECORD:  The background of this recording contained a posteriorly located occipital alpha rhythm 8 to 9 Hz that does attenuate with eye opening.  During the recording, there was slight borderline increase in some irregular 2 to 6 Hz activity seen, but no areas of clear focal slowing.  No clear spike or spike-and-wave discharges.  No recorded electrographic or dysrhythmic spells of any type seen.  Photic stimulation produces a very minimal driving response in the posterior head regions.  Hyperventilation was not performed.  The patient did enter brief stage of sleep with K complexes and sleep spindles seen in central head regions.    INTERPRETATION:  This is a somewhat borderline, but only a minimally abnormal electroencephalogram due to the generalized slowing seen most consistent with diffuse encephalopathy of toxic, metabolic, or degenerative type.  On the basis of this study, there was no clear evidence of any focal abnormality.  No clear spike or spike-and-wave discharges.  No recorded dysrhythmic electrographic spells of any type seen.  Clinical correlation recommended.        TYRONE ONOFRE MD      TAS/AQS  D:  2024 13:04:44  T:  2024 14:04:45  JOB #:  [id number]    CC:   Tyrone Onofre MD

## 2024-03-15 PROCEDURE — 97116 GAIT TRAINING THERAPY: CPT

## 2024-03-15 PROCEDURE — 1100000003 HC PRIVATE W/ TELEMETRY

## 2024-03-15 PROCEDURE — 2580000003 HC RX 258: Performed by: STUDENT IN AN ORGANIZED HEALTH CARE EDUCATION/TRAINING PROGRAM

## 2024-03-15 PROCEDURE — 6370000000 HC RX 637 (ALT 250 FOR IP): Performed by: STUDENT IN AN ORGANIZED HEALTH CARE EDUCATION/TRAINING PROGRAM

## 2024-03-15 PROCEDURE — 6370000000 HC RX 637 (ALT 250 FOR IP): Performed by: GENERAL ACUTE CARE HOSPITAL

## 2024-03-15 PROCEDURE — 97530 THERAPEUTIC ACTIVITIES: CPT

## 2024-03-15 PROCEDURE — 97535 SELF CARE MNGMENT TRAINING: CPT

## 2024-03-15 PROCEDURE — 6360000002 HC RX W HCPCS: Performed by: STUDENT IN AN ORGANIZED HEALTH CARE EDUCATION/TRAINING PROGRAM

## 2024-03-15 PROCEDURE — 94640 AIRWAY INHALATION TREATMENT: CPT

## 2024-03-15 RX ADMIN — FINASTERIDE 5 MG: 5 TABLET, FILM COATED ORAL at 09:29

## 2024-03-15 RX ADMIN — MELATONIN 3 MG: at 20:51

## 2024-03-15 RX ADMIN — ARFORMOTEROL TARTRATE: 15 SOLUTION RESPIRATORY (INHALATION) at 08:23

## 2024-03-15 RX ADMIN — ATORVASTATIN CALCIUM 40 MG: 40 TABLET, FILM COATED ORAL at 20:51

## 2024-03-15 RX ADMIN — ENOXAPARIN SODIUM 40 MG: 100 INJECTION SUBCUTANEOUS at 20:51

## 2024-03-15 RX ADMIN — ASPIRIN 81 MG: 81 TABLET, CHEWABLE ORAL at 09:30

## 2024-03-15 RX ADMIN — METOPROLOL TARTRATE 12.5 MG: 25 TABLET, FILM COATED ORAL at 20:52

## 2024-03-15 RX ADMIN — SODIUM CHLORIDE, PRESERVATIVE FREE 10 ML: 5 INJECTION INTRAVENOUS at 20:52

## 2024-03-15 RX ADMIN — SODIUM CHLORIDE, PRESERVATIVE FREE 10 ML: 5 INJECTION INTRAVENOUS at 09:30

## 2024-03-15 RX ADMIN — TAMSULOSIN HYDROCHLORIDE 0.4 MG: 0.4 CAPSULE ORAL at 09:29

## 2024-03-15 RX ADMIN — METOPROLOL TARTRATE 12.5 MG: 25 TABLET, FILM COATED ORAL at 09:29

## 2024-03-15 RX ADMIN — ACETAMINOPHEN 650 MG: 325 TABLET ORAL at 20:51

## 2024-03-16 PROCEDURE — 6360000002 HC RX W HCPCS: Performed by: STUDENT IN AN ORGANIZED HEALTH CARE EDUCATION/TRAINING PROGRAM

## 2024-03-16 PROCEDURE — 1100000003 HC PRIVATE W/ TELEMETRY

## 2024-03-16 PROCEDURE — 6370000000 HC RX 637 (ALT 250 FOR IP): Performed by: GENERAL ACUTE CARE HOSPITAL

## 2024-03-16 PROCEDURE — 6370000000 HC RX 637 (ALT 250 FOR IP): Performed by: STUDENT IN AN ORGANIZED HEALTH CARE EDUCATION/TRAINING PROGRAM

## 2024-03-16 PROCEDURE — 2580000003 HC RX 258: Performed by: STUDENT IN AN ORGANIZED HEALTH CARE EDUCATION/TRAINING PROGRAM

## 2024-03-16 PROCEDURE — 94640 AIRWAY INHALATION TREATMENT: CPT

## 2024-03-16 RX ADMIN — TAMSULOSIN HYDROCHLORIDE 0.4 MG: 0.4 CAPSULE ORAL at 10:14

## 2024-03-16 RX ADMIN — ARFORMOTEROL TARTRATE: 15 SOLUTION RESPIRATORY (INHALATION) at 07:55

## 2024-03-16 RX ADMIN — METOPROLOL TARTRATE 12.5 MG: 25 TABLET, FILM COATED ORAL at 10:14

## 2024-03-16 RX ADMIN — ENOXAPARIN SODIUM 40 MG: 100 INJECTION SUBCUTANEOUS at 20:45

## 2024-03-16 RX ADMIN — MELATONIN 3 MG: at 20:21

## 2024-03-16 RX ADMIN — FINASTERIDE 5 MG: 5 TABLET, FILM COATED ORAL at 10:14

## 2024-03-16 RX ADMIN — SODIUM CHLORIDE, PRESERVATIVE FREE 10 ML: 5 INJECTION INTRAVENOUS at 10:14

## 2024-03-16 RX ADMIN — SODIUM CHLORIDE, PRESERVATIVE FREE 10 ML: 5 INJECTION INTRAVENOUS at 20:21

## 2024-03-16 RX ADMIN — ATORVASTATIN CALCIUM 40 MG: 40 TABLET, FILM COATED ORAL at 20:21

## 2024-03-16 RX ADMIN — ARFORMOTEROL TARTRATE: 15 SOLUTION RESPIRATORY (INHALATION) at 22:30

## 2024-03-16 RX ADMIN — ASPIRIN 81 MG: 81 TABLET, CHEWABLE ORAL at 10:14

## 2024-03-16 RX ADMIN — ACETAMINOPHEN 650 MG: 325 TABLET ORAL at 20:21

## 2024-03-16 RX ADMIN — METOPROLOL TARTRATE 12.5 MG: 25 TABLET, FILM COATED ORAL at 20:21

## 2024-03-17 PROCEDURE — 6370000000 HC RX 637 (ALT 250 FOR IP): Performed by: STUDENT IN AN ORGANIZED HEALTH CARE EDUCATION/TRAINING PROGRAM

## 2024-03-17 PROCEDURE — 2580000003 HC RX 258: Performed by: STUDENT IN AN ORGANIZED HEALTH CARE EDUCATION/TRAINING PROGRAM

## 2024-03-17 PROCEDURE — 6370000000 HC RX 637 (ALT 250 FOR IP): Performed by: GENERAL ACUTE CARE HOSPITAL

## 2024-03-17 PROCEDURE — 94640 AIRWAY INHALATION TREATMENT: CPT

## 2024-03-17 PROCEDURE — 1100000003 HC PRIVATE W/ TELEMETRY

## 2024-03-17 PROCEDURE — 6360000002 HC RX W HCPCS: Performed by: STUDENT IN AN ORGANIZED HEALTH CARE EDUCATION/TRAINING PROGRAM

## 2024-03-17 RX ADMIN — MELATONIN 3 MG: at 20:55

## 2024-03-17 RX ADMIN — SODIUM CHLORIDE, PRESERVATIVE FREE 10 ML: 5 INJECTION INTRAVENOUS at 08:51

## 2024-03-17 RX ADMIN — FINASTERIDE 5 MG: 5 TABLET, FILM COATED ORAL at 08:50

## 2024-03-17 RX ADMIN — ARFORMOTEROL TARTRATE: 15 SOLUTION RESPIRATORY (INHALATION) at 20:34

## 2024-03-17 RX ADMIN — METOPROLOL TARTRATE 12.5 MG: 25 TABLET, FILM COATED ORAL at 20:58

## 2024-03-17 RX ADMIN — METOPROLOL TARTRATE 12.5 MG: 25 TABLET, FILM COATED ORAL at 08:50

## 2024-03-17 RX ADMIN — TAMSULOSIN HYDROCHLORIDE 0.4 MG: 0.4 CAPSULE ORAL at 08:50

## 2024-03-17 RX ADMIN — SODIUM CHLORIDE, PRESERVATIVE FREE 10 ML: 5 INJECTION INTRAVENOUS at 22:35

## 2024-03-17 RX ADMIN — ARFORMOTEROL TARTRATE: 15 SOLUTION RESPIRATORY (INHALATION) at 08:19

## 2024-03-17 RX ADMIN — ASPIRIN 81 MG: 81 TABLET, CHEWABLE ORAL at 08:50

## 2024-03-17 RX ADMIN — ATORVASTATIN CALCIUM 40 MG: 40 TABLET, FILM COATED ORAL at 20:55

## 2024-03-17 RX ADMIN — ENOXAPARIN SODIUM 40 MG: 100 INJECTION SUBCUTANEOUS at 20:55

## 2024-03-18 PROCEDURE — 6370000000 HC RX 637 (ALT 250 FOR IP): Performed by: STUDENT IN AN ORGANIZED HEALTH CARE EDUCATION/TRAINING PROGRAM

## 2024-03-18 PROCEDURE — 94640 AIRWAY INHALATION TREATMENT: CPT

## 2024-03-18 PROCEDURE — 1100000003 HC PRIVATE W/ TELEMETRY

## 2024-03-18 PROCEDURE — 6370000000 HC RX 637 (ALT 250 FOR IP): Performed by: GENERAL ACUTE CARE HOSPITAL

## 2024-03-18 PROCEDURE — 97116 GAIT TRAINING THERAPY: CPT

## 2024-03-18 PROCEDURE — 97530 THERAPEUTIC ACTIVITIES: CPT

## 2024-03-18 PROCEDURE — 6360000002 HC RX W HCPCS: Performed by: STUDENT IN AN ORGANIZED HEALTH CARE EDUCATION/TRAINING PROGRAM

## 2024-03-18 PROCEDURE — 2580000003 HC RX 258: Performed by: STUDENT IN AN ORGANIZED HEALTH CARE EDUCATION/TRAINING PROGRAM

## 2024-03-18 PROCEDURE — 97535 SELF CARE MNGMENT TRAINING: CPT | Performed by: OCCUPATIONAL THERAPIST

## 2024-03-18 RX ADMIN — ENOXAPARIN SODIUM 40 MG: 100 INJECTION SUBCUTANEOUS at 21:02

## 2024-03-18 RX ADMIN — METOPROLOL TARTRATE 12.5 MG: 25 TABLET, FILM COATED ORAL at 08:52

## 2024-03-18 RX ADMIN — ATORVASTATIN CALCIUM 40 MG: 40 TABLET, FILM COATED ORAL at 21:02

## 2024-03-18 RX ADMIN — ARFORMOTEROL TARTRATE: 15 SOLUTION RESPIRATORY (INHALATION) at 09:06

## 2024-03-18 RX ADMIN — ASPIRIN 81 MG: 81 TABLET, CHEWABLE ORAL at 08:52

## 2024-03-18 RX ADMIN — TAMSULOSIN HYDROCHLORIDE 0.4 MG: 0.4 CAPSULE ORAL at 08:52

## 2024-03-18 RX ADMIN — FINASTERIDE 5 MG: 5 TABLET, FILM COATED ORAL at 08:52

## 2024-03-18 RX ADMIN — SODIUM CHLORIDE, PRESERVATIVE FREE 10 ML: 5 INJECTION INTRAVENOUS at 21:03

## 2024-03-18 RX ADMIN — SODIUM CHLORIDE, PRESERVATIVE FREE 10 ML: 5 INJECTION INTRAVENOUS at 08:53

## 2024-03-18 RX ADMIN — MELATONIN 3 MG: at 21:02

## 2024-03-18 RX ADMIN — METOPROLOL TARTRATE 12.5 MG: 25 TABLET, FILM COATED ORAL at 21:02

## 2024-03-19 VITALS
HEIGHT: 66 IN | TEMPERATURE: 97.5 F | HEART RATE: 79 BPM | OXYGEN SATURATION: 93 % | WEIGHT: 179.68 LBS | BODY MASS INDEX: 28.88 KG/M2 | RESPIRATION RATE: 18 BRPM | SYSTOLIC BLOOD PRESSURE: 117 MMHG | DIASTOLIC BLOOD PRESSURE: 76 MMHG

## 2024-03-19 PROCEDURE — 6370000000 HC RX 637 (ALT 250 FOR IP): Performed by: GENERAL ACUTE CARE HOSPITAL

## 2024-03-19 PROCEDURE — 6360000002 HC RX W HCPCS: Performed by: STUDENT IN AN ORGANIZED HEALTH CARE EDUCATION/TRAINING PROGRAM

## 2024-03-19 PROCEDURE — 94640 AIRWAY INHALATION TREATMENT: CPT

## 2024-03-19 PROCEDURE — 6370000000 HC RX 637 (ALT 250 FOR IP): Performed by: STUDENT IN AN ORGANIZED HEALTH CARE EDUCATION/TRAINING PROGRAM

## 2024-03-19 PROCEDURE — 2580000003 HC RX 258: Performed by: STUDENT IN AN ORGANIZED HEALTH CARE EDUCATION/TRAINING PROGRAM

## 2024-03-19 RX ADMIN — ASPIRIN 81 MG: 81 TABLET, CHEWABLE ORAL at 09:27

## 2024-03-19 RX ADMIN — FINASTERIDE 5 MG: 5 TABLET, FILM COATED ORAL at 09:27

## 2024-03-19 RX ADMIN — METOPROLOL TARTRATE 12.5 MG: 25 TABLET, FILM COATED ORAL at 09:27

## 2024-03-19 RX ADMIN — SODIUM CHLORIDE, PRESERVATIVE FREE 10 ML: 5 INJECTION INTRAVENOUS at 09:27

## 2024-03-19 RX ADMIN — ARFORMOTEROL TARTRATE: 15 SOLUTION RESPIRATORY (INHALATION) at 07:20

## 2024-03-19 RX ADMIN — TAMSULOSIN HYDROCHLORIDE 0.4 MG: 0.4 CAPSULE ORAL at 09:27

## 2024-03-19 NOTE — PLAN OF CARE
Problem: Discharge Planning  Goal: Discharge to home or other facility with appropriate resources  Outcome: Progressing     Problem: Occupational Therapy - Adult  Goal: By Discharge: Performs self-care activities at highest level of function for planned discharge setting.  See evaluation for individualized goals.  Description: FUNCTIONAL STATUS PRIOR TO ADMISSION:    Receives Help From:  (s/o?  friend?), ADL Assistance: Needs assistance (per patient report), Bath: Maximum assistance, Dressing: Maximum assistance, Grooming: Independent, Feeding: Independent, Toileting: Independent,  , Ambulation Assistance: Needs assistance, Transfer Assistance: Needs assistance, Active : No  .  Flat affect and decreased orientation.  Appears to have decreased insight into deficits and cognition appears slowed.  Needs moderate cues to initiate and sequence tasks    Pt may not be a reliable --no family present this session.      HOME SUPPORT: pt reports that he lives with his friend who assisted him PTA on bathing/shower, dressing, and IADLs.  Pt reports that he uses a w/c (rollator is described) but does not use it in the bathroom.      Occupational Therapy Goals:  Initiated 3/13/2024  1.  Patient will perform grooming with Stand by Assist within 7 day(s).  2.  Patient will perform bathing with Moderate Assist within 7 day(s).  3.  Patient will perform upper body dressing and lower body dressing with Minimal Assist within 7 day(s).  4.  Patient will perform toilet transfers with Supervision  within 7 day(s).  5.  Patient will perform all aspects of toileting with Supervision within 7 day(s).  6.  Patient will participate in upper extremity therapeutic exercise/activities with Supervision for 5 minutes within 7 day(s).    7.  Patient will perform standing adls for at least 8 minutes using best DME within 7 days.   3/18/2024 1503 by Yuli Rogers, OTR/L  Outcome: Progressing     Problem: Respiratory - Adult  Goal: 
  Problem: Occupational Therapy - Adult  Goal: By Discharge: Performs self-care activities at highest level of function for planned discharge setting.  See evaluation for individualized goals.  Description: FUNCTIONAL STATUS PRIOR TO ADMISSION:    Receives Help From:  (s/o?  friend?), ADL Assistance: Needs assistance (per patient report), Bath: Maximum assistance, Dressing: Maximum assistance, Grooming: Independent, Feeding: Independent, Toileting: Independent,  , Ambulation Assistance: Needs assistance, Transfer Assistance: Needs assistance, Active : No  .  Flat affect and decreased orientation.  Appears to have decreased insight into deficits and cognition appears slowed.  Needs moderate cues to initiate and sequence tasks    Pt may not be a reliable --no family present this session.      HOME SUPPORT: pt reports that he lives with his friend who assisted him PTA on bathing/shower, dressing, and IADLs.  Pt reports that he uses a w/c (rollator is described) but does not use it in the bathroom.      Occupational Therapy Goals:  Initiated 3/13/2024  1.  Patient will perform grooming with Stand by Assist within 7 day(s).  2.  Patient will perform bathing with Moderate Assist within 7 day(s).  3.  Patient will perform upper body dressing and lower body dressing with Minimal Assist within 7 day(s).  4.  Patient will perform toilet transfers with Supervision  within 7 day(s).  5.  Patient will perform all aspects of toileting with Supervision within 7 day(s).  6.  Patient will participate in upper extremity therapeutic exercise/activities with Supervision for 5 minutes within 7 day(s).    7.  Patient will perform standing adls for at least 8 minutes using best DME within 7 days.   3/13/2024 1459 by Yuli Rogers, OTR/L  Outcome: Not Progressing     Problem: Physical Therapy - Adult  Goal: By Discharge: Performs mobility at highest level of function for planned discharge setting.  See evaluation for 
  Problem: Physical Therapy - Adult  Goal: By Discharge: Performs mobility at highest level of function for planned discharge setting.  See evaluation for individualized goals.  Description: FUNCTIONAL STATUS PRIOR TO ADMISSION: patient is poor historian, but seems to indicate he use a rollator walker to ambulate at home.    HOME SUPPORT PRIOR TO ADMISSION: The patient lived with significant other.     Physical Therapy Goals  Initiated 3/13/2024  1.  Patient will move from supine to sit and sit to supine, scoot up and down, and roll side to side in bed with supervision/set-up within 7 day(s).    2.  Patient will perform sit to stand with supervision/set-up within 7 day(s).  3.  Patient will transfer from bed to chair and chair to bed with standby assistance using the least restrictive device within 7 day(s).  4.  Patient will ambulate with standby assistance for 90 feet with the least restrictive device within 7 day(s).     Outcome: Not Progressing   PHYSICAL THERAPY EVALUATION    Patient: Cale Mohan (73 y.o. male)  Date: 3/13/2024  Primary Diagnosis: Syncope and collapse [R55]  NSTEMI (non-ST elevated myocardial infarction) ([facility]) [I21.4]       Precautions: Restrictions/Precautions: Fall Risk, Bed Alarm, Up as Tolerated  Required Braces or Orthoses?: No Required Braces or Orthoses?: No                    ASSESSMENT :   DEFICITS/IMPAIRMENTS:   The patient is limited by decreased functional mobility, independence in ADLs, high-level IADLs, ROM, strength, body mechanics, activity tolerance, endurance, safety awareness, cognition, attention/concentration, coordination, balance, fine-motor control following admission for NSTEMI and syncope on 3/12/24. 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    Based on the impairments listed above the patient is needing increased assistance for bed mobility, transfers and ambulation for safety and fall prevention. Baseline mobility is uncertain due to 
  Problem: Physical Therapy - Adult  Goal: By Discharge: Performs mobility at highest level of function for planned discharge setting.  See evaluation for individualized goals.  Description: FUNCTIONAL STATUS PRIOR TO ADMISSION: patient is poor historian, but seems to indicate he use a rollator walker to ambulate at home.    HOME SUPPORT PRIOR TO ADMISSION: The patient lived with significant other.     Physical Therapy Goals  Initiated 3/13/2024  1.  Patient will move from supine to sit and sit to supine, scoot up and down, and roll side to side in bed with supervision/set-up within 7 day(s).    2.  Patient will perform sit to stand with supervision/set-up within 7 day(s).  3.  Patient will transfer from bed to chair and chair to bed with standby assistance using the least restrictive device within 7 day(s).  4.  Patient will ambulate with standby assistance for 90 feet with the least restrictive device within 7 day(s).     Outcome: Progressing   PHYSICAL THERAPY TREATMENT    Patient: Cale Mohan (73 y.o. male)  Date: 3/15/2024  Diagnosis: Syncope and collapse [R55]  NSTEMI (non-ST elevated myocardial infarction) ([facility]) [I21.4] Syncope and collapse      Precautions: Fall Risk, Bed Alarm, Up as Tolerated                      ASSESSMENT:  Patient continues to benefit from skilled PT services and is progressing towards goals. Patient lying in bed when PT arrived and agreeable to mobilize. Came to sitting with extra time and intermittent moderate assistance to patients trunk to facilitate weight shifting. Sitting balance was good/fair with patient having difficulty reaching outside piotr. He tends to lean to his L more. Stood with mod a x 2 from the edge of bed with cues to improve posture and achieve stability. Gait training with RW in the room needed min a and frequent multi-modal cues to improve posture, sequencing with RW and improving stepping with RLE. The patient ambulated a total of 45 feet in the room before 
  Problem: Respiratory - Adult  Goal: Achieves optimal ventilation and oxygenation  3/15/2024 0836 by Anne-Marie Argueta RCP  Outcome: Progressing     Problem: Discharge Planning  Goal: Discharge to home or other facility with appropriate resources  Recent Flowsheet Documentation  Taken 3/15/2024 0800 by Silvia Ramachandran RN  Discharge to home or other facility with appropriate resources: Identify barriers to discharge with patient and caregiver     Problem: Chronic Conditions and Co-morbidities  Goal: Patient's chronic conditions and co-morbidity symptoms are monitored and maintained or improved  Recent Flowsheet Documentation  Taken 3/15/2024 0800 by Silvia Ramachandran RN  Care Plan - Patient's Chronic Conditions and Co-Morbidity Symptoms are Monitored and Maintained or Improved: Monitor and assess patient's chronic conditions and comorbid symptoms for stability, deterioration, or improvement     
  Problem: Respiratory - Adult  Goal: Achieves optimal ventilation and oxygenation  3/17/2024 2036 by Tali Carvajal RCP  Outcome: Progressing  3/17/2024 0821 by Anne-Marie Argueta RCP  Outcome: Progressing     
  Problem: Safety - Adult  Goal: Free from fall injury  3/13/2024 2227 by Jose Antonio De La Vega RN  Outcome: Progressing  3/13/2024 1723 by Evelyn Garcia RN  Outcome: Progressing  Flowsheets (Taken 3/13/2024 1511)  Free From Fall Injury: Instruct family/caregiver on patient safety     Problem: Discharge Planning  Goal: Discharge to home or other facility with appropriate resources  3/13/2024 2227 by Jose Antonio De La Vega RN  Outcome: Progressing  3/13/2024 1723 by Evelyn Garcia RN  Outcome: Progressing  Flowsheets  Taken 3/13/2024 1516  Discharge to home or other facility with appropriate resources:   Identify barriers to discharge with patient and caregiver   Arrange for needed discharge resources and transportation as appropriate  Taken 3/13/2024 0800  Discharge to home or other facility with appropriate resources:   Identify barriers to discharge with patient and caregiver   Identify discharge learning needs (meds, wound care, etc)   Arrange for needed discharge resources and transportation as appropriate     Problem: Skin/Tissue Integrity  Goal: Absence of new skin breakdown  Description: 1.  Monitor for areas of redness and/or skin breakdown  2.  Assess vascular access sites hourly  3.  Every 4-6 hours minimum:  Change oxygen saturation probe site  4.  Every 4-6 hours:  If on nasal continuous positive airway pressure, respiratory therapy assess nares and determine need for appliance change or resting period.  3/13/2024 2227 by Jose Antonio De La Vega RN  Outcome: Progressing  3/13/2024 1723 by Evelyn Garcia RN  Outcome: Progressing     Problem: Chronic Conditions and Co-morbidities  Goal: Patient's chronic conditions and co-morbidity symptoms are monitored and maintained or improved  3/13/2024 2227 by Jose Antonio De La Vega RN  Outcome: Progressing  3/13/2024 1723 by Evelyn Garcia RN  Outcome: Progressing  Flowsheets (Taken 3/13/2024 0800)  Care Plan - Patient's Chronic Conditions and Co-Morbidity Symptoms are Monitored 
  Problem: ABCDS Injury Assessment  Goal: Absence of physical injury  3/14/2024 1014 by Evelyn Garcia, RN  Outcome: Adequate for Discharge  3/13/2024 2227 by Jose Antonio De La Vega, RN  Outcome: Progressing     
Laterality Date    EP DEVICE PROCEDURE N/A 1/15/2024    Loop recorder insert performed by Sherly Latham MD at [facility] CARDIAC CATH LAB    IR MECHANICAL ART THROMBECTOMY INTRACRANIAL  1/11/2024    IR MECHANICAL ART THROMBECTOMY INTRACRANIAL 1/11/2024 [facility] RAD ANGIO IR          Expanded or extensive additional review of patient history:   Social/Functional History  Lives With: Significant other, Friend(s)  Home Layout: One level  Home Equipment: Rollator  Has the patient had two or more falls in the past year or any fall with injury in the past year?: Yes  Receives Help From:  (s/o?  friend?)  ADL Assistance: Needs assistance (per patient report)  Bath: Maximum assistance  Dressing: Maximum assistance  Grooming: Independent  Feeding: Independent  Toileting: Independent  Homemaking Responsibilities: No  Ambulation Assistance: Needs assistance  Transfer Assistance: Needs assistance  Active : No  Leisure & Hobbies: pt reports that he sits and watches TV      Hand Dominance: right     EXAMINATION OF PERFORMANCE DEFICITS:    Cognitive/Behavioral Status:  Orientation  Overall Orientation Status: Impaired (appears to have slowed cognitive processing--[place]? decreased attention)  Orientation Level: Oriented to person  Cognition  Overall Cognitive Status: Exceptions  Arousal/Alertness: Delayed responses to stimuli  Following Commands: Follows one step commands with increased time  Attention Span: Attends with cues to redirect;Difficulty dividing attention  Safety Judgement: Decreased awareness of need for assistance;Decreased awareness of need for safety  Problem Solving: Decreased awareness of errors;Assistance required to correct errors made;Assistance required to identify errors made  Insights: Decreased awareness of deficits  Initiation: Requires cues for some  Sequencing: Requires cues for some  Cognition Comment: overall slowed processing and decreased orientation as well as decreased insight into deficits    Skin: generally 
Mobility Training: Yes  Overall Level of Assistance: Minimum assistance  Interventions: Verbal cues;Manual cues  Supine to Sit: Minimum assistance;Additional time  Scooting: Moderate assistance  Transfers:  Transfer Training  Transfer Training: Yes  Overall Level of Assistance: Assist X2;Maximum assistance  Interventions: Manual cues;Safety awareness training;Tactile cues;Verbal cues;Visual cues  Sit to Stand: Moderate assistance;Assist X2  Stand to Sit: Minimum assistance;Assist X2  Bed to Chair: Moderate assistance;Assist X2 (bilateral armhold assistance with patient losing balance backwards due to posterior cog with hips behind feet)  Balance:  Balance  Sitting: Impaired  Sitting - Static: Good (unsupported)  Sitting - Dynamic: Fair (occasional)  Standing: Impaired  Standing - Static: Constant support;Poor (posterior cog with near constant lob backwards)  Standing - Dynamic: Poor;Constant support   Ambulation/Gait Training:      Deferred this session due to poor standing balance.                                                                                                                                                                                                                                        [facility] AM-[facility]AC®      Basic Mobility Inpatient Short Form (6-Clicks) Version 2  How much HELP from another person do you currently need... (If the patient hasn't done an activity recently, how much help from another person do you think they would need if they tried?) Total A Lot A Little None   1.  Turning from your back to your side while in a flat bed without using bedrails? []  1 [x]  2 []  3  []  4   2.  Moving from lying on your back to sitting on the side of a flat bed without using bedrails? []  1 [x]  2 []  3  []  4   3.  Moving to and from a bed to a chair (including a wheelchair)? []  1 [x]  2 []  3  []  4   4. Standing up from a chair using your arms (e.g. wheelchair or bedside chair)? []  1 
assistance  Problem Solving: Assistance required to correct errors made;Decreased awareness of errors;Assistance required to identify errors made    Functional Mobility and Transfers for ADLs:  Bed Mobility:  Bed Mobility Training  Bed Mobility Training: Yes  Overall Level of Assistance: Minimum assistance;Assist X1;Additional time;Adaptive equipment  Rolling: Minimum assistance;Assist X1;Additional time  Supine to Sit: Minimum assistance;Assist X1;Additional time;Adaptive equipment  Scooting: Minimum assistance;Assist X1     Transfers:   Transfer Training  Transfer Training: Yes  Sit to Stand: Moderate assistance;Assist X2  Stand to Sit: Minimum assistance;Assist X1  Bed to Chair: Contact-guard assistance;Minimum assistance;Assist X2           Balance:  Standing: Impaired (with RW support)  Balance  Sitting: Impaired  Sitting - Static: Good (unsupported)  Sitting - Dynamic: Fair (occasional)  Standing: Impaired (with RW support)  Standing - Static: Good;Fair;Constant support  Standing - Dynamic: Fair;Constant support      ADL Intervention:                   Grooming: Contact guard assistance;Supervision   Grooming Skilled Clinical Factors: seated this date.  encouragement for thoroughness                            LE Dressing: Maximum assistance  LE Dressing Skilled Clinical Factors: unable to reach to feet, states his friend assists him PRN--cannot bend forward nor use crossed leg technique       Toileting Skilled Clinical Factors: using male pure wick system                    Skin Care: Foam skin cleanser;Bath wipes  needed cues and additional time for thoroughness.   Performed sitting this date                Pain Rating:  Did not report pain  Pain Intervention(s):         Activity Tolerance:   Fair , requires rest breaks, SpO2 stable on room air,  Please refer to the flowsheet for vital signs taken during this treatment.    After treatment:   Patient left in no apparent distress sitting up in chair, Call bell 
decreased (< 100 feet/min)  Step Length: Right shortened;Left shortened  Gait Abnormalities: Decreased step clearance;Trunk sway increased;Step to gait;Shuffling gait  Neuro Re-Education:              Pain Rating:  Denied c/o pain    Activity Tolerance:   VSS on RA throughout    After treatment:   Patient left in no apparent distress sitting up in chair, Call bell within reach, and Bed/ chair alarm activated      COMMUNICATION/EDUCATION:   The patient's plan of care was discussed with: occupational therapist and registered nurse    Patient Education  Education Given To: Patient  Education Provided: Role of Therapy  Education Method: Verbal  Barriers to Learning: Cognition  Education Outcome: Verbalized understanding;Continued education needed      Keira Riley, PT, DPT  Minutes: 23   
and containers                                                 Toileting Skilled Clinical Factors: pt recently used the bed pan for BM  and declined further toileting.  Pt feels that he can walk to the bathroom independently           Functional Mobility:  (Assist X2)        Educated on use of BSC and recommended if pt returns home                Pain Ratin/10   Pain Intervention(s):         Activity Tolerance:   VSS  Please refer to the flowsheet for vital signs taken during this treatment.    After treatment:   Patient left in no apparent distress sitting up in chair, Call bell within reach, Bed/ chair alarm activated, and nurse informed    COMMUNICATION/EDUCATION:   The patient's plan of care was discussed with: physical therapist and registered nurse    Patient Education  Education Given To: Patient  Education Provided: Plan of Care;Transfer Training  Education Method: Verbal  Barriers to Learning: Cognition (baseline dementia ? levelat baseline?  lacks  insight)  Education Outcome: Continued education needed    Thank you for this referral.  Yuli Rogers OTR/L  Minutes: 15         
physical therapist and registered nurse    Patient Education  Education Given To: Patient  Education Provided: Plan of Care;Transfer Training;Role of Therapy;ADL Adaptive Strategies;Precautions;Fall Prevention Strategies  Education Provided Comments: benefit of using RUE for functional tasks  Education Method: Verbal  Barriers to Learning: Cognition  Education Outcome: Continued education needed;Verbalized understanding    Thank you for this referral.  Jeane Gibbons OT  Minutes: 31

## 2024-03-19 NOTE — CARE COORDINATION
Care Management Initial Assessment       RUR: 17% (moderate RUR)  Readmission? No  1st IM letter given? Yes - 3/12/2024  1st  letter given: No     Patient does not appear to be fully alert and oriented per nursing assessment.  Therapy confirmed patient is still moderate assist for some activities; IPR recommended at this time.  MD was speaking with patient's significant other, Magalis Cotter and CM spoke with her afterwards.      Magalis confirmed that patient first received a dementia diagnosis about a year and a half ago and she typically assists him with ADLs.  Patient was ambulating some previously with the rollator.  He was at [facility] for about 2 weeks, then [facility] for about 1.5 weeks (presumed for IPR), then [facility] more recently (Magalis believes he just left SNF this past weekend on Saturday).  Magalis confirmed that the patient is managed by [facility] provides him with PCP care at home.  He receives services through [facility] PT/OT/SLP.     Magalis has known the patient for 45 years and has been with him for 33 years.  She confirmed that the patient's mother  in children, his father  when the patient was about 11 years old and said she does not know of any family that the patient might have.  She said that patient is not , he has no living children and all known cousins are .  Magalis later said that the patient had a sister who was last living in [place], but she does not have contact information (name or phone number) for this individual as the patient was not in contact with her.     Magalis is agreeable to IPR for the patient but had concerns about insurance approving the stay as he was in rehabs recently.  CM let her know that [facility] is out-of-network with patient's insurance but CM can look into other options in [place], including [facility], [facility] and [facility]; Magalis is agreeable.    When asked if patient or Magalis had any POA documents, she said that they did not, 
Transition of Care Plan:    RUR: 16%   Prior Level of Functioning: need assist  Disposition: [facility] - approved  report number is [phone] room# 913-2  If SNF or IPR: Date FOC offered:   Date FOC received:   Accepting facility:   Date authorization started with reference number: [id number]  Date authorization received and expires: 3/19 to 3/21 Valerio Sanchez  [phone] ref# [id number]  Follow up appointments:   DME needed: per facility  Transportation at discharge:   IM/IMM Medicare/ letter given: 3/19/2024  Is patient a [place] and connected with VA?    If yes, was [place] transfer form completed and VA notified?   Caregiver Contact: Katy [place]emaker[place] Domestic Partner - [phone] - [email or web address]   Discharge Caregiver contacted prior to discharge?   Care Conference needed?   Barriers to discharge:       Auth received per [facility] from [facility] insurance- [employer] to  patient at 3:30 pm- Katy Domestic Partner made aware-  called [facility] bedside nurse made aware. SUE ALVARADO, MSW  [phone]    Transition of Care Plan to SNF/Rehab    Communication to Patient/Family:  Met with patient and family and they are agreeable to the transition plan. The Plan for Transition of Care is related to the following treatment goals: SNF    The Patient and/or patient representative was provided with a choice of provider and agrees  with the discharge plan.      Yes [x] No []    A Freedom of choice list was provided with basic dialogue that supports the patient's individualized plan of care/goals and shares the quality data associated with the providers.       Yes [x] No []    SNF/Rehab Transition:  Patient has been accepted to [facility] and meets criteria for admission.   Patient will transported by [employer] and expected to leave at 3:30 pm.    Communication to SNF/Rehab:  Bedside RN, Shanta, has been notified to update the transition plan to the facility and call report (phone number).  Discharge 
Transition of Care Plan:    RUR: 16% (moderate RUR)  Prior Level of Functioning: Needing assistance  Disposition: [facility], auth pending  If SNF or IPR: Date FOC offered: 3/14 IPR  Date FOC received: 3/14 - no preference  Accepting facility: [facility]  Date authorization started with reference number: 3/16/2024, Ref ID #: [id number]   Date authorization received and expires: TBD  Follow up appointments: defer to IPR  DME needed: defer to IPR.  Patient has a rollator at home.  Trans[facility] at discharge: [facility] anticipated  IM/IMM Medicare/ letter given: 2nd IM done 3/15/2024.  Is patient a [place] and connected with VA? No.   If yes, was [place] transfer form completed and VA notified? N/A  Caregiver Contact: Magalisanaya Cotter - Domestic Partner - [phone] - [email or web address]   Discharge Caregiver contacted prior to discharge? Patient to contact.  Care Conference needed? No.  Barriers to discharge: placement and auth    CM received update that authorization is pending at this time.      JAYCOB MariaN, RN    Care Management  [phone]    
Transition of Care Plan:    RUR: 17% (moderate RUR)  Prior Level of Functioning: Needing assistance  Disposition: IPR referrals pending, auth needed  If SNF or IPR: Date FOC offered: 3/14 IPR  Date FOC received: 3/14 - no preference  Accepting facility: [facility]  Date authorization started with reference number: [facility]  Date authorization received and expires: TBD  Follow up appointments: defer to IPR  DME needed: defer to IPR.  Patient has a rollator at home.  Trans[facility] at discharge: [facility] anticipated  IM/IMM Medicare/ letter given: 2nd IM done 3/15/2024.  Is patient a [place] and connected with VA? No.   If yes, was [place] transfer form completed and VA notified? N/A  Caregiver Contact: Magalis Cotter - Domestic Partner - [phone] - [email or web address]   Discharge Caregiver contacted prior to discharge? Patient to contact.  Care Conference needed? No.  Barriers to discharge: placement and auth    0912 -  discussed lack of POA/family for patient with CM Supervisor who confirmed significant other can continue to make decisions for the patient that are not end-of-life decisions.  CM reached out to [facility] to see if they can accept patient due to [facility] and [facility] declining.  [facility] is out-of-network for patient's insurance.    1112 - CM notified Magalis Cotter of [facility] reviewing patient.      Heidy Yadav, JAYCOBN, RN    Care Management  [phone]  
Transition of Care Plan:    RUR: 17% (moderate RUR)  Prior Level of Functioning: Needing assistance  Disposition: IPR referrals pending, auth needed  If SNF or IPR: Date FOC offered: 3/14 IPR  Date FOC received: 3/14 - no preference  Accepting facility: Pending   Date authorization started with reference number: Pending  Date authorization received and expires:   Follow up appointments: defer to IPR  DME needed: defer to IPR.  Patient has a rollator at home.  Transportation at discharge: [facility] anticipated  IM/IMM Medicare/ letter given: 2nd IM done 3/15/2024.  Is patient a  and connected with VA? No.   If yes, was  transfer form completed and VA notified? N/A  Caregiver Contact: Magalis Vahe - Domestic Partner - [phone] - [email or web address]   Discharge Caregiver contacted prior to discharge? Patient to contact.  Care Conference needed? No.  Barriers to discharge: placement and auth    CM spoke to Donaldo Mcgee [phone]  at [facility] for updates. He indicated that the patient's chart is still under review to assess suitability for acceptance at the facility. He assured that he would follow up with updates accordingly.       Page Gonzalez, RN  Case Management  [phone]    
on discharge.  STEPHANIE contacted Cat at [facility] admissions at [phone] who confirmed patient had 100 days when he last admitted and only used 18.  Cat confirmed they can accept patient and will start auth; updated PT/OT notes sent to them via [employer].      Heidy Yadav, JAYCOBN, RN    Care Management  [phone]

## 2024-03-19 NOTE — PROGRESS NOTES
03/13/24 1102 03/13/24 1107 03/13/24 1115   Vital Signs   Pulse 84 83  --    BP (!) 129/90 (!) 141/85 (!) 139/93   MAP (Calculated) 103 104 108   BP Location Left upper arm  --   --    BP Method Automatic  --   --    Patient Position Trendelenburg;Semi fowlers Sitting Standing      03/13/24 1129   Vital Signs   Pulse 94   /81   MAP (Calculated) 97   BP Location Left upper arm   BP Method Automatic   Patient Position Sitting  (post ambuatin activity and toilettransfer in bathroom)     Yuli Rogers, OTR/L    
1400: Brooke from CT placed 18g in left upper arm.   
1900: Bedside and Verbal shift change report given to REGIS Brady (oncoming nurse) by Regis Rivas (offgoing nurse). Report included the following information Nurse Handoff Report, Index, Adult Overview, Intake/Output, MAR, Recent Results, and Cardiac Rhythm NSR .     1945: heparin d/c per cardiology MD Ericka    End of Shift Note    Bedside shift change report given to REGIS Rivas (oncoming nurse) by Jose Antonio De La Vega RN (offgoing nurse).  Report included the following information SBAR, Kardex, Intake/Output, MAR, Recent Results, and Cardiac Rhythm NSR    Shift worked:  7p-7a     Shift summary and any significant changes:          Concerns for physician to address:       Zone phone for oncoming shift:          Activity:     Number times ambulated in hallways past shift: 0  Number of times OOB to chair past shift: 0    Cardiac:   Cardiac Monitoring: Yes           Access:  Current line(s): PIV     Genitourinary:   Urinary status: external catheter    Respiratory:      Chronic home O2 use?: NO  Incentive spirometer at bedside: NO       GI:     Current diet:  ADULT DIET; Regular; Low Fat/Low Chol/High Fiber/VINCE  Passing flatus: YES  Tolerating current diet: YES       Pain Management:   Patient states pain is manageable on current regimen: YES    Skin:     Interventions: specialty bed, float heels, increase time out of bed, PT/OT consult, and internal/external urinary devices    Patient Safety:  Fall Score:    Interventions: bed/chair alarm, assistive device (walker, cane. etc), gripper socks, pt to call before getting OOB, and stay with me (per policy)       Length of Stay:  Expected LOS: 3  Actual LOS: 1      Jose Antonio De La Vega RN                            
ADULT PROTOCOL: JET AEROSOL ASSESSMENT    Patient  Cale Mohan     73 y.o.   male     3/16/2024  1:59 PM    Breath Sounds Pre Procedure: Breath Sounds Pre-Tx MINERVA: Diminished                                  Breath Sounds Pre-Tx LLL: Diminished        Breath Sounds Pre-Tx RUL: Diminished        Breath Sounds Pre-Tx RML: Diminished        Breath Sounds Pre-Tx RLL: Diminished  Breath Sounds Post Procedure: Breath Sounds Post-Tx MINERVA: Diminished          Breath Sounds Post-Tx LLL: Diminished          Breath Sounds Post-Tx RUL: Diminished          Breath Sounds Post-Tx RML: Diminished          Breath Sounds Post-Tx RLL: Diminished                                     Heart Rate: Pre procedure Pre-Tx Pulse: 67           Post procedure      Resp Rate: Pre procedure Pre-Tx Resps: 21           Post procedure      Peak Flow: Pre bronchodilator             Post bronchodilator       Oxygen: O2 Therapy: Room air        Changed: No    SpO2:  SpO2: 95 %   without Oxygen                Nebulizer Therapy: Current medications Medications: Arformoterol, Budesonide      Changed: No    Comments:        Problem List:   Patient Active Problem List   Diagnosis    Acute cerebrovascular accident (CVA) (HCC)    Acute cerebrovascular accident (CVA) due to occlusion of left carotid artery (HCC)    Primary hypertension    Hyperlipidemia    Syncope and collapse    Acute alteration in mental status    Convulsive syncope       Respiratory Therapist: Anne-Marie Argueta RCP    
Dr. Harding notified of CTA results. Dr. Harding to review and will notify RN if heparin drip is to be d/bonita.   
End of Shift Note    Bedside shift change report given to ,Ryne STACY (Oncoming nurse) by Cherelle Blizzard RN (Offgoing nurse). Report included the following information: SBAR, Kardex, Cardiac Rhythm sinus rhythm/sinus tachycardia, and Alarm Parameters.    Patient Active Problem List    Diagnosis Date Noted    Acute alteration in mental status 03/14/2024    Convulsive syncope 03/14/2024    Syncope and collapse 03/13/2024    Acute cerebrovascular accident (CVA) due to occlusion of left carotid artery (HCC) 01/12/2024    Primary hypertension 01/12/2024    Hyperlipidemia 01/12/2024    Acute cerebrovascular accident (CVA) ([facility]) 01/11/2024       Activity:  Activity: In bed  Number times ambulated in hallways past shift: 0  Number of times OOB to chair past shift: 1  Cardiac:   Cardiac Monitoring: Yes    Access:   Current line(s):PIV    Genitourinary:   Urinary status: Voiding AUOP    Respiratory:   O2 Device: None (Room air)    GI:  Last BM (including prior to admit): 03/17/24  Current diet:  ADULT DIET; Regular; Low Fat/Low Chol/High Fiber/VINCE    Tolerating current diet: Yes  Pain Management:   Patient denies any pain.     Skin:  Craig Scale Score: 16  Interventions: Turn and reposition    Patient Safety:  Fall Score: Ceballos Total Score: 100  Interventions: bed/chair alarm     DVT prophylaxis:  DVT prophylaxis Med: Lovenox    Wounds: (If Applicable)  Wounds: N/A    Active Consults:  IP CONSULT TO CARDIOLOGY    Length of Stay:  Expected LOS: 7  Actual LOS: 5  Discharge Plan: Home/SNF/IPR when medically stable    
End of Shift Note    Bedside shift change report given to REGIS Canela (Oncoming nurse) by Rianna Randle RN (Offgoing nurse). Report included the following information: SBAR, Kardex, Cardiac Rhythm sinus rhythm/sinus tachycardia, and Alarm Parameters.    Patient Active Problem List    Diagnosis Date Noted    Acute alteration in mental status 03/14/2024    Convulsive syncope 03/14/2024    Syncope and collapse 03/13/2024    Acute cerebrovascular accident (CVA) due to occlusion of left carotid artery (HCC) 01/12/2024    Primary hypertension 01/12/2024    Hyperlipidemia 01/12/2024    Acute cerebrovascular accident (CVA) ([facility]) 01/11/2024       Activity:  Activity: In bed  Number times ambulated in hallways past shift: 0  Number of times OOB to chair past shift: 1  Cardiac:   Cardiac Monitoring: Yes    Access:   Current line(s):PIV    Genitourinary:   Urinary status: Voiding AUOP    Respiratory:   O2 Device: None (Room air)    GI:  Last BM (including prior to admit): 03/17/24  Current diet:  ADULT DIET; Regular; Low Fat/Low Chol/High Fiber/VINCE    Tolerating current diet: Yes  Pain Management:   Patient denies any pain.     Skin:  Craig Scale Score: 16  Interventions: Turn and reposition    Patient Safety:  Fall Score: Ceballos Total Score: 100  Interventions: bed/chair alarm     DVT prophylaxis:  DVT prophylaxis Med: Lovenox    Wounds: (If Applicable)  Wounds: N/A    Active Consults:  IP CONSULT TO CARDIOLOGY    Length of Stay:  Expected LOS: 6  Actual LOS: 5  Discharge Plan: Home/SNF/IPR when medically stable    
End of Shift Note    Bedside shift change report given to Silvia (oncoming nurse) by Blessed Samreen RN (offgoing nurse).  Report included the following information SBAR, MAR, and Cardiac Rhythm sinus rhythm    Shift worked:  7p-7a     Shift summary and any significant changes:     No significant changes to report overnight     Concerns for physician to address:  None     Zone phone for oncoming shift:   [phone]       Activity:     Number times ambulated in hallways past shift: 0  Number of times OOB to chair past shift: 0    Cardiac:   Cardiac Monitoring: Yes           Access:  Current line(s): PIV     Genitourinary:   Urinary status: external catheter    Respiratory:      Chronic home O2 use?: N/A  Incentive spirometer at bedside: N/A       GI:     Current diet:  ADULT DIET; Regular; Low Fat/Low Chol/High Fiber/VINCE  Passing flatus: YES  Tolerating current diet: YES       Pain Management:   Patient states pain is manageable on current regimen: YES    Skin:     Interventions: increase time out of bed and PT/OT consult    Patient Safety:  Fall Score:    Interventions: bed/chair alarm       Length of Stay:  Expected LOS: 2  Actual LOS: 2      Blessed Samreen RN                            
Magalis Cotter, (patient's friend that he lives with) will come around [phone] to pick patient up.   
Patient's EEG just showed minimal borderline slowing, but no focal abnormality no spike or spike and wave discharges and no recorded spells of any type.  
Reviewed results from coronary CT angiogram. Overall, unremarkable. Discontinue heparin. The patient is okay for outpatient follow up from cardiology standpoint.     Thanks, please call with questions.   
Routine EEG competed.  
Spiritual Care Partner Volunteer visited patient at [facility] in [facility] 2 CARDIAC MEDICAL STEP DOWN on 3/19/2024   Documented by:  Taylor Ybarra MPS, BCC, Staff   [facility]     Paging Service [phone] ([phone])      
TRANSFER - IN REPORT:    Verbal report received from REGIS Goodson on Cale Mohan  being received from ED for routine progression of patient care      Report consisted of patient's Situation, Background, Assessment and   Recommendations(SBAR).     Information from the following report(s) Nurse Handoff Report, Index, Adult Overview, Intake/Output, MAR, Recent Results, and Cardiac Rhythm NSR  was reviewed with the receiving nurse.    Opportunity for questions and clarification was provided.      0633: Assessment completed upon patient's arrival to unit and care assumed.     0716: shift report given to REGIS Rivas.   
  03/15/24 1630 -- 98 °F (36.7 °C) Axillary -- -- -- --   03/15/24 1515 137/73 97.7 °F (36.5 °C) Oral 89 24 -- --   03/15/24 1230 -- 97.7 °F (36.5 °C) Oral -- -- -- --           Intake/Output Summary (Last 24 hours) at 3/16/2024 1204  Last data filed at 3/15/2024 1840  Gross per 24 hour   Intake 480 ml   Output 301 ml   Net 179 ml          I had a face to face encounter and independently examined this patient, as outlined below:  PHYSICAL EXAM:  General: WD, WN. No acute distress    EENT:  EOMI. Anicteric sclerae. MMM  Resp:  CTA bilaterally, no wheezing or rales.  No accessory muscle use  CV:  Regular  rhythm,  No edema  GI/:  Soft. Non distended. No tenderness.  +Bowel sounds  Neurologic:  Alert and oriented X 2, normal speech,   Psych:   Not anxious nor agitated  Skin/MSK: No rashes.  No jaundice      Current Inpatient Medications:      Current Facility-Administered Medications:     aspirin chewable tablet 81 mg, 81 mg, Oral, Daily, Adebayo Jacobsen DO, 81 mg at 03/16/24 1014    atorvastatin (LIPITOR) tablet 40 mg, 40 mg, Oral, Nightly, Adebayo Jacobsen DO, 40 mg at 03/15/24 2051    finasteride (PROSCAR) tablet 5 mg, 5 mg, Oral, Daily, Adebayo Jacobsen DO, 5 mg at 03/16/24 1014    tamsulosin (FLOMAX) capsule 0.4 mg, 0.4 mg, Oral, Daily, Adebayo Jacobsen DO, 0.4 mg at 03/16/24 1014    sodium chloride flush 0.9 % injection 5-40 mL, 5-40 mL, IntraVENous, 2 times per day, Adebayo Jacobsen DO, 10 mL at 03/16/24 1014    sodium chloride flush 0.9 % injection 5-40 mL, 5-40 mL, IntraVENous, PRN, Adebayo Jacobsen, DO    0.9 % sodium chloride infusion, , IntraVENous, PRN, Adebayo Jacobsen DO, Stopped at 03/13/24 0439    ondansetron (ZOFRAN-ODT) disintegrating tablet 4 mg, 4 mg, Oral, Q8H PRN **OR** ondansetron (ZOFRAN) injection 4 mg, 4 mg, IntraVENous, Q6H PRN, Adebayo Jacobsen,     polyethylene glycol (GLYCOLAX) packet 17 g, 17 g, Oral, Daily PRN, Adebayo Jacobsenn, 
Date/Time    Rapid influenza A/B antigens [[id number]] Collected: 03/12/24 2047    Order Status: Completed Specimen: Nasopharyngeal Updated: 03/12/24 2110     Influenza A Ag Negative        Influenza B Ag Negative       COVID-19, Rapid [[id number]] Collected: 03/12/24 2047    Order Status: Completed Specimen: Nasopharyngeal Updated: 03/12/24 2110     Source Nasopharyngeal        SARS-CoV-2, Rapid Not detected        Comment: Rapid Abbott ID Now     Rapid NAAT:  The specimen is NEGATIVE for SARS-CoV-2, the novel coronavirus associated with COVID-19.     Negative results should be treated as presumptive and, if inconsistent with clinical signs and symptoms or necessary for patient management, should be tested with an alternative molecular assay.  Negative results do not preclude SARS-CoV-2 infection and should not be used as the sole basis for patient management decisions.     This test has been authorized by the FDA under an Emergency Use Authorization (EUA) for use by authorized laboratories.   Fact sheet for Healthcare Providers:  https://www.fda.gov/media/642766/download  Fact sheet for Patients: https://www.fda.gov/media/065213/download     Methodology: Isothermal Nucleic Acid Amplification               ECHO:   01/11/24    ECHO (TTE) COMPLETE (PRN CONTRAST/BUBBLE/STRAIN/3D) 01/12/2024  2:04 PM (Final)    Interpretation Summary    Left Ventricle: Normal left ventricular systolic function with a visually estimated EF of 55 - 60%. Left ventricle size is normal. Normal wall thickness. Normal wall motion.    Interatrial Septum: No interatrial shunt visualized with color Doppler. Agitated saline study was negative with and without provocation.    Image quality is fair.    Signed by: Salas Hall MD on 1/12/2024  2:04 PM    Procedures: see electronic medical records for all procedures/Xrays and details which were not copied into this note but were reviewed prior to creation of Plan.    Reviewed most current lab 
decline in flow with terminal LCx FFR of 0.83  - RCA show moderate decline in flow with mid RCA FFR of 0.77 distal to the  proximal calcified plaque.       Microbiology:  Results       Procedure Component Value Units Date/Time    Rapid influenza A/B antigens [[id number]] Collected: 03/12/24 2047    Order Status: Completed Specimen: Nasopharyngeal Updated: 03/12/24 2110     Influenza A Ag Negative        Influenza B Ag Negative       COVID-19, Rapid [[id number]] Collected: 03/12/24 2047    Order Status: Completed Specimen: Nasopharyngeal Updated: 03/12/24 2110     Source Nasopharyngeal        SARS-CoV-2, Rapid Not detected        Comment: Rapid Abbott ID Now     Rapid NAAT:  The specimen is NEGATIVE for SARS-CoV-2, the novel coronavirus associated with COVID-19.     Negative results should be treated as presumptive and, if inconsistent with clinical signs and symptoms or necessary for patient management, should be tested with an alternative molecular assay.  Negative results do not preclude SARS-CoV-2 infection and should not be used as the sole basis for patient management decisions.     This test has been authorized by the FDA under an Emergency Use Authorization (EUA) for use by authorized laboratories.   Fact sheet for Healthcare Providers:  https://www.fda.gov/media/602555/download  Fact sheet for Patients: https://www.fda.gov/media/011217/download     Methodology: Isothermal Nucleic Acid Amplification               ECHO:   01/11/24    ECHO (TTE) COMPLETE (PRN CONTRAST/BUBBLE/STRAIN/3D) 01/12/2024  2:04 PM (Final)    Interpretation Summary    Left Ventricle: Normal left ventricular systolic function with a visually estimated EF of 55 - 60%. Left ventricle size is normal. Normal wall thickness. Normal wall motion.    Interatrial Septum: No interatrial shunt visualized with color Doppler. Agitated saline study was negative with and without provocation.    Image quality is fair.    Signed by: Salas Hall MD on 
and details which were not copied into this note but were reviewed prior to creation of Plan.    Reviewed most current lab test results and cultures  YES  Reviewed most current radiology test results   YES  Review and summation of old records today    NO  Reviewed patient's current orders and MAR    YES  PMH/SH reviewed - no change compared to H&P    ________________________________________________________________________      Total NON critical care TIME:        Total CRITICAL CARE TIME Spent:   Minutes non procedure based          Comments   >50% of visit spent in counseling and coordination of care x    ________________________________________________________________________        Signed: David L Mendel, MD              
https://www.fda.gov/media/522948/download  Fact sheet for Patients: https://www.fda.gov/media/233354/download     Methodology: Isothermal Nucleic Acid Amplification               ECHO:   01/11/24    ECHO (TTE) COMPLETE (PRN CONTRAST/BUBBLE/STRAIN/3D) 01/12/2024  2:04 PM (Final)    Interpretation Summary    Left Ventricle: Normal left ventricular systolic function with a visually estimated EF of 55 - 60%. Left ventricle size is normal. Normal wall thickness. Normal wall motion.    Interatrial Septum: No interatrial shunt visualized with color Doppler. Agitated saline study was negative with and without provocation.    Image quality is fair.    Signed by: Salas Hall MD on 1/12/2024  2:04 PM    Procedures: see electronic medical records for all procedures/Xrays and details which were not copied into this note but were reviewed prior to creation of Plan.    Reviewed most current lab test results and cultures  YES  Reviewed most current radiology test results   YES  Review and summation of old records today    NO  Reviewed patient's current orders and MAR    YES  PMH/ reviewed - no change compared to H&P    ________________________________________________________________________      Total NON critical care TIME:  35  Minutes      Total CRITICAL CARE TIME Spent:   Minutes non procedure based          Comments   >50% of visit spent in counseling and coordination of care x    ________________________________________________________________________        Signed: Silas Gant MD

## 2024-03-19 NOTE — DISCHARGE INSTRUCTIONS
You were treated for passing out (syncope). Your medications were adjusted and you were seen by Cardiology. Please followup with their office and take the medications as listed in this paperwork.

## 2024-03-20 NOTE — DISCHARGE SUMMARY
Discharge Summary    Name: Cale Mohan  [id number]  YOB: 1950 (Age: 73 y.o.)   Date of Admission: 3/12/2024  Date of Discharge: 3/19/2024  Attending Physician: Dr. Mendel    Discharge Diagnosis:     Syncope and collapse-concern for cardiac syncope  Elevated troponin of unclear significance  History of cryptogenic CVA, on Jan 2024  Essential hypertension  Hyperlipidemia  BPH   Reactive airway disease   Underlying dementia      Consultations:  IP CONSULT TO CARDIOLOGY      Brief Admission History/Reason for Admission Per Adebayo Jacobsen, DO:     Cale Mohan is a 73 y.o.  male with PMHx as listed below presenting to the emergency department after witnessed loss of consciousness lasting reportedly 3-4 minutes without any seizure-like activity that occurred while resting.  Patient with baseline dementia alert only to self.  Denies any complaints or concerns at this time.  No further history available.     In the ED, patient afebrile and hemodynamically stable saturating upper 90s on room air.  ECG demonstrates normal sinus rhythm without definitive ischemic change.  CT head negative for acute process.  CXR negative for acute process.  COVID and flu negative.  Labs demonstrate: High sensitive troponin 210, sodium 140, potassium 4.0, glucose 108, BUN 13, creatinine 1.06, LFTs grossly unremarkable (minimal hyperbilirubinemia 1.6), CBC unremarkable.  Patient given full-strength aspirin and started on ACS heparin by ED provider.     We were asked to admit for work up and evaluation of the above problems.     Brief Hospital Course by Main Problems:     Syncope and collapse-concern for cardiac syncope  Elevated troponin of unclear significance  History of cryptogenic CVA, on Jan 2024  Essential hypertension  Hyperlipidemia  Cardiology consulted, greatly appreciate their expertise  off Heparin drip  CTA coronary not that remarkable as per Cardio, OK to f/up as outpt as

## 2024-04-30 ENCOUNTER — HOSPITAL ENCOUNTER (OUTPATIENT)
Facility: HOSPITAL | Age: 74
Discharge: HOME | End: 2024-05-03
Payer: MEDICARE

## 2024-04-30 DIAGNOSIS — R13.12 OROPHARYNGEAL DYSPHAGIA: ICD-10-CM

## 2024-04-30 DIAGNOSIS — I63.9 IMPENDING CEREBROVASCULAR ACCIDENT (HCC): ICD-10-CM

## 2024-04-30 PROCEDURE — 92611 MOTION FLUOROSCOPY/SWALLOW: CPT

## 2024-04-30 PROCEDURE — 74230 X-RAY XM SWLNG FUNCJ C+: CPT

## 2024-04-30 NOTE — PROGRESS NOTES
[facility]  SPEECH PATHOLOGY MODIFIED BARIUM SWALLOW STUDY  Patient: Cale Mohan (73 y.o. male)  Date: 4/30/2024  Referring Provider:  NARA Hummel    SUBJECTIVE:   Patient and wife c/o coughing after all PO at times: solids, liquids. This started 2 weeks ago.       OBJECTIVE:   Past Medical History: L MCA CVA in Jan with mild oral dysphagia.    No past medical history on file.  Past Surgical History:   Procedure Laterality Date    EP DEVICE PROCEDURE N/A 1/15/2024    Loop recorder insert performed by Sherly Latham MD at [facility] CARDIAC CATH LAB    IR MECHANICAL ART THROMBECTOMY INTRACRANIAL  1/11/2024    IR MECHANICAL ART THROMBECTOMY INTRACRANIAL 1/11/2024 [facility] RAD ANGIO IR     Current Dietary Status:  regular, thins  Type of Study: Modified barium swallow  Film Views: Lateral  Radiologist: Isabel  Patient Position: upright in Hausted chair      Trial 1: Trial 2:   Consistencies Administered:  (thins, pudding, cracker, barium pill)     How Presented: SLP-fed/Presented;Self-fed/presented;Spoon;Straw;Successive Swallows  ORAL PHASE:      Bolus Acceptance: Impaired (tended to take sips)     Bolus Formation/Control: Impaired (reduced chew of solids) swallowed fairly large chunks of cracker whole               Oral Residue: None      PHARYNGEAL PHASE:      Timing: No impairment     Penetration: None     Aspiration/Timing: No evidence of aspiration     Pharyngeal Clearance: Vallecular residue;10-50% (with purees-mild-moderate. with solids: moderate). A moderate chunk of cracker hung between valleculae and pyriforms.  He eventually swallowed this without incident.      Attempted Modifications: Alternate liquids/solids (cleared most of solid residue)      He had 1 wet cough in this MBS that was not related to any aspiration or penetration. He also had several wet coughs after MBS.        Trial 3: Trial 4:    ESOPHAGEAL PHASE:   Brief esophageal scroll into upper esophagus did not reveal anything

## 2024-06-21 ENCOUNTER — HOSPITAL ENCOUNTER (INPATIENT)
Facility: HOSPITAL | Age: 74
LOS: 5 days | Discharge: NURSING, REHAB OR LONG-TERM CARE | DRG: 871 | End: 2024-06-26
Attending: EMERGENCY MEDICINE | Admitting: STUDENT IN AN ORGANIZED HEALTH CARE EDUCATION/TRAINING PROGRAM
Payer: MEDICARE

## 2024-06-21 ENCOUNTER — APPOINTMENT (OUTPATIENT)
Facility: HOSPITAL | Age: 74
DRG: 871 | End: 2024-06-21
Payer: MEDICARE

## 2024-06-21 DIAGNOSIS — R60.9 ASYMMETRIC EDEMA OF BOTH LOWER EXTREMITIES: ICD-10-CM

## 2024-06-21 DIAGNOSIS — J18.9 COMMUNITY ACQUIRED PNEUMONIA, UNSPECIFIED LATERALITY: Primary | ICD-10-CM

## 2024-06-21 LAB
ALBUMIN SERPL-MCNC: 3.5 G/DL (ref 3.5–5)
ALBUMIN/GLOB SERPL: 0.7 (ref 1.1–2.2)
ALP SERPL-CCNC: 94 U/L (ref 45–117)
ALT SERPL-CCNC: 68 U/L (ref 12–78)
ANION GAP BLD CALC-SCNC: 11 (ref 10–20)
ANION GAP SERPL CALC-SCNC: 5 MMOL/L (ref 5–15)
APPEARANCE UR: CLEAR
AST SERPL-CCNC: 61 U/L (ref 15–37)
BACTERIA URNS QL MICRO: NEGATIVE /HPF
BASE DEFICIT BLD-SCNC: 0.2 MMOL/L
BASOPHILS # BLD: 0 K/UL (ref 0–0.1)
BASOPHILS NFR BLD: 0 % (ref 0–1)
BILIRUB SERPL-MCNC: 0.9 MG/DL (ref 0.2–1)
BILIRUB UR QL: NEGATIVE
BUN SERPL-MCNC: 19 MG/DL (ref 6–20)
BUN/CREAT SERPL: 17 (ref 12–20)
CA-I BLD-MCNC: 1.27 MMOL/L (ref 1.12–1.32)
CALCIUM SERPL-MCNC: 9.5 MG/DL (ref 8.5–10.1)
CHLORIDE BLD-SCNC: 104 MMOL/L (ref 100–108)
CHLORIDE SERPL-SCNC: 108 MMOL/L (ref 97–108)
CO2 BLD-SCNC: 30 MMOL/L (ref 19–24)
CO2 SERPL-SCNC: 27 MMOL/L (ref 21–32)
COLOR UR: NORMAL
CREAT SERPL-MCNC: 1.14 MG/DL (ref 0.7–1.3)
CREAT UR-MCNC: 0.7 MG/DL (ref 0.6–1.3)
DIFFERENTIAL METHOD BLD: ABNORMAL
ECHO BSA: 2.03 M2
EOSINOPHIL # BLD: 0.1 K/UL (ref 0–0.4)
EOSINOPHIL NFR BLD: 1 % (ref 0–7)
EPITH CASTS URNS QL MICRO: NORMAL /LPF
ERYTHROCYTE [DISTWIDTH] IN BLOOD BY AUTOMATED COUNT: 13.9 % (ref 11.5–14.5)
GLOBULIN SER CALC-MCNC: 4.7 G/DL (ref 2–4)
GLUCOSE BLD STRIP.AUTO-MCNC: 77 MG/DL (ref 74–99)
GLUCOSE SERPL-MCNC: 88 MG/DL (ref 65–100)
GLUCOSE UR STRIP.AUTO-MCNC: NEGATIVE MG/DL
HCO3 BLDA-SCNC: 29 MMOL/L
HCT VFR BLD AUTO: 44.3 % (ref 36.6–50.3)
HGB BLD-MCNC: 13.9 G/DL (ref 12.1–17)
HGB UR QL STRIP: NEGATIVE
HYALINE CASTS URNS QL MICRO: NORMAL /LPF (ref 0–2)
IMM GRANULOCYTES # BLD AUTO: 0 K/UL (ref 0–0.04)
IMM GRANULOCYTES NFR BLD AUTO: 0 % (ref 0–0.5)
KETONES UR QL STRIP.AUTO: NEGATIVE MG/DL
LACTATE BLD-SCNC: 0.86 MMOL/L (ref 0.4–2)
LACTATE BLD-SCNC: 3.19 MMOL/L (ref 0.4–2)
LEUKOCYTE ESTERASE UR QL STRIP.AUTO: NEGATIVE
LYMPHOCYTES # BLD: 0.5 K/UL (ref 0.8–3.5)
LYMPHOCYTES NFR BLD: 6 % (ref 12–49)
MCH RBC QN AUTO: 29.8 PG (ref 26–34)
MCHC RBC AUTO-ENTMCNC: 31.4 G/DL (ref 30–36.5)
MCV RBC AUTO: 95.1 FL (ref 80–99)
MONOCYTES # BLD: 0.7 K/UL (ref 0–1)
MONOCYTES NFR BLD: 9 % (ref 5–13)
NEUTS SEG # BLD: 6.4 K/UL (ref 1.8–8)
NEUTS SEG NFR BLD: 84 % (ref 32–75)
NITRITE UR QL STRIP.AUTO: NEGATIVE
NRBC # BLD: 0 K/UL (ref 0–0.01)
NRBC BLD-RTO: 0 PER 100 WBC
PCO2 BLDV: 66.7 MMHG (ref 41–51)
PH BLDV: 7.25 (ref 7.32–7.42)
PH UR STRIP: 6 (ref 5–8)
PLATELET # BLD AUTO: 197 K/UL (ref 150–400)
PMV BLD AUTO: 10 FL (ref 8.9–12.9)
PO2 BLDV: <27 MMHG (ref 25–40)
POTASSIUM BLD-SCNC: 4.1 MMOL/L (ref 3.5–5.5)
POTASSIUM SERPL-SCNC: 3.9 MMOL/L (ref 3.5–5.1)
PROCALCITONIN SERPL-MCNC: 0.08 NG/ML
PROT SERPL-MCNC: 8.2 G/DL (ref 6.4–8.2)
PROT UR STRIP-MCNC: NEGATIVE MG/DL
RBC # BLD AUTO: 4.66 M/UL (ref 4.1–5.7)
RBC #/AREA URNS HPF: NORMAL /HPF (ref 0–5)
RBC MORPH BLD: ABNORMAL
SERVICE CMNT-IMP: ABNORMAL
SODIUM BLD-SCNC: 145 MMOL/L (ref 136–145)
SODIUM SERPL-SCNC: 140 MMOL/L (ref 136–145)
SP GR UR REFRACTOMETRY: 1.02
SPECIMEN SITE: ABNORMAL
TROPONIN I SERPL HS-MCNC: 17 NG/L (ref 0–76)
TROPONIN I SERPL HS-MCNC: 38 NG/L (ref 0–76)
URINE CULTURE IF INDICATED: NORMAL
UROBILINOGEN UR QL STRIP.AUTO: 1 EU/DL (ref 0.2–1)
WBC # BLD AUTO: 7.7 K/UL (ref 4.1–11.1)
WBC URNS QL MICRO: NORMAL /HPF (ref 0–4)

## 2024-06-21 PROCEDURE — 84145 PROCALCITONIN (PCT): CPT

## 2024-06-21 PROCEDURE — 84484 ASSAY OF TROPONIN QUANT: CPT

## 2024-06-21 PROCEDURE — 36415 COLL VENOUS BLD VENIPUNCTURE: CPT

## 2024-06-21 PROCEDURE — 84132 ASSAY OF SERUM POTASSIUM: CPT

## 2024-06-21 PROCEDURE — 83605 ASSAY OF LACTIC ACID: CPT

## 2024-06-21 PROCEDURE — 2580000003 HC RX 258: Performed by: EMERGENCY MEDICINE

## 2024-06-21 PROCEDURE — 82947 ASSAY GLUCOSE BLOOD QUANT: CPT

## 2024-06-21 PROCEDURE — 71250 CT THORAX DX C-: CPT

## 2024-06-21 PROCEDURE — 82330 ASSAY OF CALCIUM: CPT

## 2024-06-21 PROCEDURE — 93005 ELECTROCARDIOGRAM TRACING: CPT | Performed by: EMERGENCY MEDICINE

## 2024-06-21 PROCEDURE — 96365 THER/PROPH/DIAG IV INF INIT: CPT

## 2024-06-21 PROCEDURE — 6370000000 HC RX 637 (ALT 250 FOR IP): Performed by: STUDENT IN AN ORGANIZED HEALTH CARE EDUCATION/TRAINING PROGRAM

## 2024-06-21 PROCEDURE — 99285 EMERGENCY DEPT VISIT HI MDM: CPT

## 2024-06-21 PROCEDURE — 82803 BLOOD GASES ANY COMBINATION: CPT

## 2024-06-21 PROCEDURE — 71045 X-RAY EXAM CHEST 1 VIEW: CPT

## 2024-06-21 PROCEDURE — 1100000003 HC PRIVATE W/ TELEMETRY

## 2024-06-21 PROCEDURE — 94640 AIRWAY INHALATION TREATMENT: CPT

## 2024-06-21 PROCEDURE — 6360000002 HC RX W HCPCS: Performed by: EMERGENCY MEDICINE

## 2024-06-21 PROCEDURE — 87040 BLOOD CULTURE FOR BACTERIA: CPT

## 2024-06-21 PROCEDURE — 6360000002 HC RX W HCPCS: Performed by: STUDENT IN AN ORGANIZED HEALTH CARE EDUCATION/TRAINING PROGRAM

## 2024-06-21 PROCEDURE — 85025 COMPLETE CBC W/AUTO DIFF WBC: CPT

## 2024-06-21 PROCEDURE — 81001 URINALYSIS AUTO W/SCOPE: CPT

## 2024-06-21 PROCEDURE — 80053 COMPREHEN METABOLIC PANEL: CPT

## 2024-06-21 PROCEDURE — 2580000003 HC RX 258: Performed by: STUDENT IN AN ORGANIZED HEALTH CARE EDUCATION/TRAINING PROGRAM

## 2024-06-21 PROCEDURE — 84295 ASSAY OF SERUM SODIUM: CPT

## 2024-06-21 PROCEDURE — 93970 EXTREMITY STUDY: CPT

## 2024-06-21 PROCEDURE — 6370000000 HC RX 637 (ALT 250 FOR IP)

## 2024-06-21 RX ORDER — ASPIRIN 81 MG/1
81 TABLET ORAL DAILY
COMMUNITY

## 2024-06-21 RX ORDER — ENOXAPARIN SODIUM 100 MG/ML
1 INJECTION SUBCUTANEOUS 2 TIMES DAILY
Status: DISCONTINUED | OUTPATIENT
Start: 2024-06-21 | End: 2024-06-25

## 2024-06-21 RX ORDER — FLUTICASONE PROPIONATE AND SALMETEROL 100; 50 UG/1; UG/1
1 POWDER RESPIRATORY (INHALATION) 2 TIMES DAILY
COMMUNITY
Start: 2024-06-20

## 2024-06-21 RX ORDER — FINASTERIDE 5 MG/1
5 TABLET, FILM COATED ORAL DAILY
COMMUNITY
Start: 2024-06-20

## 2024-06-21 RX ORDER — APIXABAN 5 MG/1
5 TABLET, FILM COATED ORAL 2 TIMES DAILY
COMMUNITY
Start: 2024-06-10

## 2024-06-21 RX ORDER — FINASTERIDE 5 MG/1
5 TABLET, FILM COATED ORAL DAILY
Status: DISCONTINUED | OUTPATIENT
Start: 2024-06-22 | End: 2024-06-26 | Stop reason: HOSPADM

## 2024-06-21 RX ORDER — SODIUM CHLORIDE 0.9 % (FLUSH) 0.9 %
5-40 SYRINGE (ML) INJECTION EVERY 12 HOURS SCHEDULED
Status: DISCONTINUED | OUTPATIENT
Start: 2024-06-21 | End: 2024-06-26 | Stop reason: HOSPADM

## 2024-06-21 RX ORDER — POLYETHYLENE GLYCOL 3350 17 G/17G
17 POWDER, FOR SOLUTION ORAL DAILY PRN
Status: DISCONTINUED | OUTPATIENT
Start: 2024-06-21 | End: 2024-06-26 | Stop reason: HOSPADM

## 2024-06-21 RX ORDER — ATORVASTATIN CALCIUM 40 MG/1
40 TABLET, FILM COATED ORAL DAILY
COMMUNITY
Start: 2024-06-20

## 2024-06-21 RX ORDER — ACETAMINOPHEN 325 MG/1
650 TABLET ORAL EVERY 6 HOURS PRN
Status: DISCONTINUED | OUTPATIENT
Start: 2024-06-21 | End: 2024-06-26 | Stop reason: HOSPADM

## 2024-06-21 RX ORDER — POLYMYXIN B SULFATE AND TRIMETHOPRIM 1; 10000 MG/ML; [USP'U]/ML
1 SOLUTION OPHTHALMIC EVERY 6 HOURS
Status: DISCONTINUED | OUTPATIENT
Start: 2024-06-21 | End: 2024-06-26 | Stop reason: HOSPADM

## 2024-06-21 RX ORDER — SODIUM CHLORIDE 0.9 % (FLUSH) 0.9 %
5-40 SYRINGE (ML) INJECTION PRN
Status: DISCONTINUED | OUTPATIENT
Start: 2024-06-21 | End: 2024-06-26 | Stop reason: HOSPADM

## 2024-06-21 RX ORDER — ERGOCALCIFEROL 1.25 MG/1
50000 CAPSULE ORAL WEEKLY
COMMUNITY
Start: 2024-06-10

## 2024-06-21 RX ORDER — METOPROLOL TARTRATE 1 MG/ML
5 INJECTION, SOLUTION INTRAVENOUS EVERY 6 HOURS PRN
Status: DISCONTINUED | OUTPATIENT
Start: 2024-06-21 | End: 2024-06-26 | Stop reason: HOSPADM

## 2024-06-21 RX ORDER — ATORVASTATIN CALCIUM 40 MG/1
40 TABLET, FILM COATED ORAL DAILY
Status: DISCONTINUED | OUTPATIENT
Start: 2024-06-22 | End: 2024-06-26 | Stop reason: HOSPADM

## 2024-06-21 RX ORDER — ASPIRIN 81 MG/1
81 TABLET ORAL DAILY
Status: DISCONTINUED | OUTPATIENT
Start: 2024-06-22 | End: 2024-06-26 | Stop reason: HOSPADM

## 2024-06-21 RX ORDER — ONDANSETRON 2 MG/ML
4 INJECTION INTRAMUSCULAR; INTRAVENOUS EVERY 6 HOURS PRN
Status: DISCONTINUED | OUTPATIENT
Start: 2024-06-21 | End: 2024-06-26 | Stop reason: HOSPADM

## 2024-06-21 RX ORDER — ACETAMINOPHEN 650 MG/1
650 SUPPOSITORY RECTAL EVERY 4 HOURS PRN
Status: DISCONTINUED | OUTPATIENT
Start: 2024-06-21 | End: 2024-06-21 | Stop reason: SDUPTHER

## 2024-06-21 RX ORDER — POLYMYXIN B SULFATE AND TRIMETHOPRIM 1; 10000 MG/ML; [USP'U]/ML
1 SOLUTION OPHTHALMIC EVERY 6 HOURS
COMMUNITY
Start: 2024-06-17 | End: 2024-07-12

## 2024-06-21 RX ORDER — ACETAMINOPHEN 650 MG/1
650 SUPPOSITORY RECTAL EVERY 6 HOURS PRN
Status: DISCONTINUED | OUTPATIENT
Start: 2024-06-21 | End: 2024-06-26 | Stop reason: HOSPADM

## 2024-06-21 RX ORDER — ACETAMINOPHEN 650 MG/1
SUPPOSITORY RECTAL
Status: COMPLETED
Start: 2024-06-21 | End: 2024-06-21

## 2024-06-21 RX ORDER — 0.9 % SODIUM CHLORIDE 0.9 %
30 INTRAVENOUS SOLUTION INTRAVENOUS ONCE
Status: COMPLETED | OUTPATIENT
Start: 2024-06-21 | End: 2024-06-21

## 2024-06-21 RX ORDER — SODIUM CHLORIDE 9 MG/ML
INJECTION, SOLUTION INTRAVENOUS PRN
Status: DISCONTINUED | OUTPATIENT
Start: 2024-06-21 | End: 2024-06-26 | Stop reason: HOSPADM

## 2024-06-21 RX ORDER — CETIRIZINE HYDROCHLORIDE 10 MG/1
10 TABLET ORAL DAILY
COMMUNITY

## 2024-06-21 RX ORDER — ENOXAPARIN SODIUM 100 MG/ML
40 INJECTION SUBCUTANEOUS DAILY
Status: DISCONTINUED | OUTPATIENT
Start: 2024-06-21 | End: 2024-06-21

## 2024-06-21 RX ORDER — ONDANSETRON 4 MG/1
4 TABLET, ORALLY DISINTEGRATING ORAL EVERY 8 HOURS PRN
Status: DISCONTINUED | OUTPATIENT
Start: 2024-06-21 | End: 2024-06-26 | Stop reason: HOSPADM

## 2024-06-21 RX ADMIN — SODIUM CHLORIDE 50 ML: 9 INJECTION, SOLUTION INTRAVENOUS at 17:58

## 2024-06-21 RX ADMIN — ACETAMINOPHEN 650 MG: 650 SUPPOSITORY RECTAL at 23:08

## 2024-06-21 RX ADMIN — POLYMYXIN B SULFATE AND TRIMETHOPRIM 1 DROP: 10000; 1 SOLUTION OPHTHALMIC at 23:03

## 2024-06-21 RX ADMIN — AZITHROMYCIN MONOHYDRATE 500 MG: 500 INJECTION, POWDER, LYOPHILIZED, FOR SOLUTION INTRAVENOUS at 15:19

## 2024-06-21 RX ADMIN — SODIUM CHLORIDE 2613 ML: 9 INJECTION, SOLUTION INTRAVENOUS at 08:38

## 2024-06-21 RX ADMIN — SODIUM CHLORIDE 1000 MG: 900 INJECTION INTRAVENOUS at 08:37

## 2024-06-21 RX ADMIN — ACETAMINOPHEN 650 MG: 650 SUPPOSITORY RECTAL at 15:33

## 2024-06-21 RX ADMIN — ARFORMOTEROL TARTRATE: 15 SOLUTION RESPIRATORY (INHALATION) at 22:07

## 2024-06-21 RX ADMIN — PIPERACILLIN AND TAZOBACTAM 4500 MG: 4; .5 INJECTION, POWDER, LYOPHILIZED, FOR SOLUTION INTRAVENOUS at 17:59

## 2024-06-21 RX ADMIN — ENOXAPARIN SODIUM 90 MG: 100 INJECTION SUBCUTANEOUS at 17:59

## 2024-06-21 RX ADMIN — PIPERACILLIN AND TAZOBACTAM 3375 MG: 3; .375 INJECTION, POWDER, LYOPHILIZED, FOR SOLUTION INTRAVENOUS at 21:57

## 2024-06-21 RX ADMIN — POLYMYXIN B SULFATE AND TRIMETHOPRIM 1 DROP: 10000; 1 SOLUTION OPHTHALMIC at 18:50

## 2024-06-21 RX ADMIN — SODIUM CHLORIDE, PRESERVATIVE FREE 10 ML: 5 INJECTION INTRAVENOUS at 21:55

## 2024-06-21 ASSESSMENT — PAIN - FUNCTIONAL ASSESSMENT: PAIN_FUNCTIONAL_ASSESSMENT: 0-10

## 2024-06-21 ASSESSMENT — PAIN SCALES - GENERAL
PAINLEVEL_OUTOF10: 0
PAINLEVEL_OUTOF10: 0

## 2024-06-21 ASSESSMENT — LIFESTYLE VARIABLES
HOW MANY STANDARD DRINKS CONTAINING ALCOHOL DO YOU HAVE ON A TYPICAL DAY: PATIENT UNABLE TO ANSWER
HOW OFTEN DO YOU HAVE A DRINK CONTAINING ALCOHOL: PATIENT UNABLE TO ANSWER

## 2024-06-21 NOTE — H&P
Hospitalist Admission Note    NAME:   Tony Madsen   : 1950   MRN: [id number]     Date/Time: 2024 5:37 PM    Patient PCP: No, Pcp    ______________________________________________________________________  Given the patient's current clinical presentation, I have a high level of concern for decompensation if discharged from the emergency department.  Complex decision making was performed, which includes reviewing the patient's available past medical records, laboratory results, and x-ray films.       My assessment of this patient's clinical condition and my plan of care is as follows.    Assessment / Plan:  Severe sepsis likely from aspiration pneumonia   Elevated lactate 3.19--0.86   Generalized weakness may be due to above     CXR -No acute process   UA unremarkable   CTA chest :Moderate left and mild right lower lobe pneumonia. Debris in the trachea.Aspiration pneumonia is likely.    Plan:  Admit to medical tele  S/p septic bolus  Continue Zosyn  and Azithromycin   Follow up with Blood culture   NPO for now  SLP evaluation in am       Parkinson's Dementia   H/o stroke 2024 with residual deficit on right side   -Oral medications on hold due to mental status  -Patient is on Eliquis. Wife is unaware about medications.  -Bilateral LE edema : Will order US doppler to rule out DVT          Medical Decision Making:   I personally reviewed labs: yes   I personally reviewed imaging:yes   I personally reviewed EKG:irregular baseline , sinus tachycardia   Toxic drug monitoring: Zosyn   Discussed case with: ED provider. After discussion I am in agreement that acuity of patient's medical condition necessitates hospital stay.      Code Status:   DVT Prophylaxis:   Baseline: oriented to himself and person, walks with walker and roller aid     Subjective:   CHIEF COMPLAINT: generalized     HISTORY OF PRESENT ILLNESS:     Tony Madsen is a 73 y.o.  male with PMHx as mentioned below brought in by ambulance after  silhouette is within normal limits. The pulmonary vasculature is within normal limits. The lungs and pleural spaces are clear. The visualized bones and upper abdomen are age-appropriate.     No acute process on portable chest. Electronically signed by PRIMO KEITH     _______________________________________________________________________    TOTAL TIME:  76 Minutes    Critical Care Provided     Minutes non procedure based    Signed: Hailey Espinal MD    Procedures: see electronic medical records for all procedures/Xrays and details which were not copied into this note but were reviewed prior to creation of Plan.

## 2024-06-21 NOTE — CARE COORDINATION
5:07 PM  CM informed that pt's chart is marked for merge, patient name is Cale Mohan, MRN: [id number] . CM provided by RN with pt's domestic partner's number, Magalis Cotter [phone]. CM placed call to Ms. Cotter to complete initial assessment. Full note to follow.    2:24 PM  CM reviewed chart, attempted to meet with pt at bedside to conduct initial CM assessment. Pt does not rouse to knock at door or calling of name; pt noted to be a poor historian by ED MD and RN. Pt has no emergency contacts on chart, chart review revealed no additional information for contacts. Should family arrive at bedside, please add emergency contacts for care team.      JAMES Lucas.  Care Manager, [facility]  [phone]/Available on Perfect Serve

## 2024-06-21 NOTE — PROGRESS NOTES
End of Shift Note    Bedside shift change report given to  REGIS Engle  (oncoming nurse) by Ayaka Hannah RN .        Shift worked:  Days   Shift summary and any significant changes:    Patient admitted and oriented to unit   Patient on 1L O2 at this time  Admission assessment completed   IV ATB administered     Concerns for physician to address:  None   Zone phone for oncoming shift:  [phone]     Patient Information  Tony Madsen  73 y.o.  6/21/2024  7:25 AM by Hailey Espinal MD. Tony Madsen was admitted from [facility]    Problem List  Patient Active Problem List    Diagnosis Date Noted    Acute pneumonia 06/21/2024     No past medical history on file.    Core Measures:  CVA: no  CHF: no  PNA: yes    Activity:     Number times ambulated in hallways past shift: 0  Number of times OOB to chair past shift: 0    Cardiac:   Cardiac Monitoring: yes    Access:   Current line(s): PIV    Respiratory:   O2 Device: Nasal cannula    GI:     Current diet:  Diet NPO  Tolerating current diet: Yes    Pain Management:   Patient states pain is manageable on current regimen: yes    Skin:  Craig Scale Score: 12  Interventions: turn q2  Pressure injury: no    Patient Safety:  Fall Score:    Interventions: stay with me program  Self-release roll belt: No  Dexterity to release roll belt: N/A   (must document dexterity  here by stating Yes or No here, otherwise this is a restraint and must follow restraint documentation policy.)    DVT prophylaxis:  DVT prophylaxis: meds    Active Consults:  IP CONSULT TO PHARMACY    Length of Stay:  Expected LOS: 3  Actual LOS: 0    Ayaka Hannah RN

## 2024-06-21 NOTE — ED NOTES
Orders placed for abx att. Pharmacy did not verify. Message sent to pharmacy to verify @ 0883 to verify d/t code sepsis. Pharmacy responded and verified at 0814.

## 2024-06-21 NOTE — ED NOTES
Assumed care of pt at this time. Pt arrives with reports of sliding off toilet this am, pt family was able to assist to ground. Pt has hx of dementia and parkinsons and is wheelchair bound at baseline. Pt on monitor x3 with call bell in reach. Pt side rails x2, bed locked in low position, and bed alarm on.

## 2024-06-21 NOTE — CARE COORDINATION
Care Management Initial Assessment       RUR: 7% low   Readmission? No  1st IM letter given? No  1st  letter given: No      Initial note: Chart review completed prior to assessment. CM completed assessment with pt's partner/caregiver, Magalis Cotter, as pt is not appropriate at present time to complete assessment. CM introduced self, role of CM, verified demographics to ensure accuracy, and discussed transition of care planning. Pt resides with partner, shares time between his home (address on file) and pt's granddaughter's apartment: [address], [address], [place] [address]. Pt's partner unsure which location he will be discharging to at present, will know when d/c date is known. Pt and pt's partner do not drive, rely on pt's partner's son to provide transportation needs. Pt is established with [facility] for home-based primary care and pharmacy services. Pt requires support with all ADLs/IADLs, provided by Ms. Cotter. Pt is seen biweekly by home-based primary care, has upcoming home visit on 6/27/24. Pt owns a rollator and wheelchair. Pt is open with [facility] ( PT) would like to resume services with this agency. Pharmacy preference is [facility].    Care management will continue to be available to assist as transition of care planning needs arise. Full assessment below:         06/21/24 1730   Service Assessment   Patient Orientation Unable to Assess   Cognition Dementia / Early Alzheimer's  (Pt's partner reports known diagnosis of Dementia and Parkinson's.)   History Provided By Significant Other  (Magalis Cotter, domestic partner [phone])   Primary Caregiver Other (Comment)  (Partner, Magalis Cotter, is caregiver at home, assists with all ADLs/IADLs)   Support Systems Spouse/Significant Other   Patient's Healthcare Decision Maker is: (S)  Legal Next of Kin  (Pt has no ACP documents on file, partner reports no documents at home. Pt is not , has no children, has  wheelchair)   Potential Assistance Needed Home Care  (Anticipate need for [facility] vs SNF?)   Patient expects to be discharged to: House   Condition of Participation: Discharge Planning   Freedom of Choice list was provided with basic dialogue that supports the patient's individualized plan of care/goals, treatment preferences, and shares the quality data associated with the providers?  Yes         Advance Care Planning     General Advance Care Planning (ACP) Conversation    Date of Conversation: 6/21/2024  Conducted with: Magalis Cotter, pt's significant other   Other persons present: None    Healthcare Decision Maker: No healthcare decision makers have been documented.  Click here to complete HealthCare Decision Makers including selection of the Healthcare Decision Maker Relationship (ie \"Primary\")   Today we discussed healthcare decision makers and legal NOK hierarchy. Pt has no ACP documents on file, partner reports no documents at home. Pt is not , has no children, has no living parents, may have a sister that resides in [place] but uncertain as pt/partner have not talked to her in many years. At previous hospitalization, pt's significant other has made non-end of life related decisions for pt. Pt is not appropriate at present time to discuss/complete AMD. Would benefit from follow-up when appropriate and completion of AMD.    Content/Action Overview:  Has NO ACP documents-Information provided      Length of Voluntary ACP Conversation in minutes:  <16 minutes (Non-Billable)    JAMES May.  Care Manager, [facility]  [phone]/Available on Perfect Serve

## 2024-06-21 NOTE — PROGRESS NOTES
Pharmacy Medication Reconciliation     The patient was NOT interviewed regarding current PTA medication list, use and drug allergies;  patient's wife present over telephone and obtained permission from patient to discuss drug regimen with visitor(s) present. The patient was questioned regarding use of any other inhalers, topical products, over the counter medications, herbal medications, vitamin products or ophthalmic/nasal/otic medication use.     Allergy Update: Patient has no known allergies.    Recommendations/Findings:   The following amendments were made to the patient's active medication list on file at [facility]:   1) Additions:   +all medications below  2) Deletions: none  3) Changes: none  Pertinent Findings:   -patient has another chart up for merger    Clarified PTA med list with rx query, patient's wife/significant other interview. PTA medication list was corrected to the following:     Prior to Admission Medications   Prescriptions Last Dose Informant   ELIQUIS 5 MG TABS tablet 6/21/2024 Spouse/Significant Other   Sig: Take 1 tablet by mouth 2 times daily   WIXELA INHUB 100-50 MCG/ACT AEPB diskus inhaler 6/21/2024 Spouse/Significant Other   Sig: Inhale 1 puff into the lungs 2 times daily   aspirin 81 MG EC tablet 6/21/2024 Spouse/Significant Other   Sig: Take 1 tablet by mouth daily   atorvastatin (LIPITOR) 40 MG tablet 6/21/2024 Spouse/Significant Other   Sig: Take 1 tablet by mouth daily   carbidopa-levodopa (SINEMET)  MG per tablet 6/21/2024 Spouse/Significant Other   Sig: Take 1 tablet by mouth 3 times daily 7a, 3pm, 11pm   cetirizine (ZYRTEC) 10 MG tablet 6/21/2024 Spouse/Significant Other   Sig: Take 1 tablet by mouth daily   finasteride (PROSCAR) 5 MG tablet 6/21/2024 Spouse/Significant Other   Sig: Take 1 tablet by mouth daily   metoprolol tartrate (LOPRESSOR) 25 MG tablet 6/21/2024 Spouse/Significant Other   Sig: Take 0.5 tablets by mouth 2 times daily   trimethoprim-polymyxin b (POLYTRIM)

## 2024-06-21 NOTE — ED PROVIDER NOTES
[facility] EMERGENCY DEPT  EMERGENCY DEPARTMENT HISTORY AND PHYSICAL EXAM      Date: 6/21/2024  Patient Name: Tony Madsen  MRN: [id number]  Birthdate 1950  Date of evaluation: 6/21/2024  Provider: Eric King MD   Note Started: 7:54 AM EDT 6/21/24    HISTORY OF PRESENT ILLNESS     Chief Complaint   Patient presents with    Fall     Pt BIBEMS from home with reports of slipping off toilet PTA. Pt reportedly cannot use legs and complaining of generalized weakness. Pt responds to orientation questions with \"yes\" but will not state the correct answers. EMS states pt in Afib RVR       History Provided By: Patient    HPI: Tony Madsen is a 73 y.o. male brought in by EMS reports slipping off the toilet just prior to arrival.  Patient been complaining of some generalized weakness response to orientation questions appropriately.  EMS found the patient in A-fib with RVR.  Is unclear with the patient's neurologic baseline is he appears globally weak at this point time is a poor historian.  Patient does have SIRS criteria and has been highlighted as a sepsis case.    PAST MEDICAL HISTORY   Past Medical History:  No past medical history on file.    Past Surgical History:  No past surgical history on file.    Family History:  No family history on file.    Social History:       Allergies:  No Known Allergies    PCP: No, Pcp    Current Meds:   Current Facility-Administered Medications   Medication Dose Route Frequency Provider Last Rate Last Admin    cefTRIAXone (ROCEPHIN) 1,000 mg in sodium chloride 0.9 % 50 mL IVPB (mini-bag)  1,000 mg IntraVENous Q24H Eric King MD   Stopped at 06/21/24 0905    azithromycin (ZITHROMAX) 500 mg in sodium chloride 0.9 % 250 mL IVPB (Pdge0Mal)  500 mg IntraVENous Q24H Eric King MD         No current outpatient medications on file.       Social Determinants of Health:   Social Determinants of Health     Tobacco Use: Not on file   Alcohol Use: Patient Unable To Answer (6/21/2024)  no white blood cell count I suspect a viral syndrome at this point. [CS]   1324 After fluid bolus patient still in A-fib with RVR.  Will treat with diltiazem and reassess second troponin. [CS]   1349 I discussed the case with the hospitalist team will evaluate the patient for admission [CS]      ED Course User Index  [CS] Eric King MD       SEPSIS Reassessment: Sepsis Reassessment:    The patient meets sepsis criteria with a suspected source of Pneumonia/Respiratory  Cultures and antibiotics were initiated sequentially and appropriately as per orders.   Fluid resuscitation volume with 30ml/kg crystalloid bolus was: GIVEN: the patient received the full standard 30ml/kg bolus based on actual body weight.  I have performed a sepsis reassessment of the patient's clinical volume status and tissue perfusion at time: 1108 and the patient is adequately resuscitated..    Clinical Management Tools:      Patient was given the following medications:  Medications   cefTRIAXone (ROCEPHIN) 1,000 mg in sodium chloride 0.9 % 50 mL IVPB (mini-bag) (0 mg IntraVENous Stopped 6/21/24 0905)   azithromycin (ZITHROMAX) 500 mg in sodium chloride 0.9 % 250 mL IVPB ([id number]) (has no administration in time range)   sodium chloride 0.9 % bolus 2,613 mL (0 mLs IntraVENous Stopped 6/21/24 1040)       CONSULTS: See ED Course/MDM for further details.  None     Social Determinants affecting Diagnosis/Treatment: None    Smoking Cessation: Not Applicable    PROCEDURES   Unless otherwise noted above, none  Procedures      CRITICAL CARE TIME   CRITICAL CARE NOTE :    1:50 PM    IMPENDING DETERIORATION -Cardiovascular  ASSOCIATED RISK FACTORS - Hypotension, Hypoxia, Dysrhythmia, Metabolic changes, and Dehydration  MANAGEMENT- Bedside Assessment and Supervision of Care  INTERPRETATION -  Xrays, CT Scan, ECG, Blood Pressure, and Cardiac Output Measures   INTERVENTIONS - Metabolic interventions  CASE REVIEW - Hospitalist/Intensivist  TREATMENT

## 2024-06-22 LAB
ANION GAP SERPL CALC-SCNC: 5 MMOL/L (ref 5–15)
BASOPHILS # BLD: 0 K/UL (ref 0–0.1)
BASOPHILS NFR BLD: 0 % (ref 0–1)
BUN SERPL-MCNC: 15 MG/DL (ref 6–20)
BUN/CREAT SERPL: 12 (ref 12–20)
CALCIUM SERPL-MCNC: 8.3 MG/DL (ref 8.5–10.1)
CHLORIDE SERPL-SCNC: 112 MMOL/L (ref 97–108)
CO2 SERPL-SCNC: 25 MMOL/L (ref 21–32)
CREAT SERPL-MCNC: 1.22 MG/DL (ref 0.7–1.3)
DIFFERENTIAL METHOD BLD: ABNORMAL
EOSINOPHIL # BLD: 0 K/UL (ref 0–0.4)
EOSINOPHIL NFR BLD: 0 % (ref 0–7)
ERYTHROCYTE [DISTWIDTH] IN BLOOD BY AUTOMATED COUNT: 14.4 % (ref 11.5–14.5)
GLUCOSE BLD STRIP.AUTO-MCNC: 102 MG/DL (ref 65–117)
GLUCOSE BLD STRIP.AUTO-MCNC: 114 MG/DL (ref 65–117)
GLUCOSE BLD STRIP.AUTO-MCNC: 70 MG/DL (ref 65–117)
GLUCOSE BLD STRIP.AUTO-MCNC: 86 MG/DL (ref 65–117)
GLUCOSE SERPL-MCNC: 94 MG/DL (ref 65–100)
HCT VFR BLD AUTO: 37.9 % (ref 36.6–50.3)
HGB BLD-MCNC: 12.1 G/DL (ref 12.1–17)
IMM GRANULOCYTES # BLD AUTO: 0.1 K/UL (ref 0–0.04)
IMM GRANULOCYTES NFR BLD AUTO: 0 % (ref 0–0.5)
LYMPHOCYTES # BLD: 1.1 K/UL (ref 0.8–3.5)
LYMPHOCYTES NFR BLD: 9 % (ref 12–49)
MCH RBC QN AUTO: 30 PG (ref 26–34)
MCHC RBC AUTO-ENTMCNC: 31.9 G/DL (ref 30–36.5)
MCV RBC AUTO: 94 FL (ref 80–99)
MONOCYTES # BLD: 0.9 K/UL (ref 0–1)
MONOCYTES NFR BLD: 7 % (ref 5–13)
NEUTS SEG # BLD: 10.3 K/UL (ref 1.8–8)
NEUTS SEG NFR BLD: 84 % (ref 32–75)
NRBC # BLD: 0 K/UL (ref 0–0.01)
NRBC BLD-RTO: 0 PER 100 WBC
PLATELET # BLD AUTO: 169 K/UL (ref 150–400)
PMV BLD AUTO: 10.2 FL (ref 8.9–12.9)
POTASSIUM SERPL-SCNC: 3.8 MMOL/L (ref 3.5–5.1)
PROCALCITONIN SERPL-MCNC: 13.55 NG/ML
RBC # BLD AUTO: 4.03 M/UL (ref 4.1–5.7)
SERVICE CMNT-IMP: NORMAL
SODIUM SERPL-SCNC: 142 MMOL/L (ref 136–145)
WBC # BLD AUTO: 12.4 K/UL (ref 4.1–11.1)

## 2024-06-22 PROCEDURE — 6370000000 HC RX 637 (ALT 250 FOR IP): Performed by: STUDENT IN AN ORGANIZED HEALTH CARE EDUCATION/TRAINING PROGRAM

## 2024-06-22 PROCEDURE — 80048 BASIC METABOLIC PNL TOTAL CA: CPT

## 2024-06-22 PROCEDURE — 84145 PROCALCITONIN (PCT): CPT

## 2024-06-22 PROCEDURE — 94640 AIRWAY INHALATION TREATMENT: CPT

## 2024-06-22 PROCEDURE — 85025 COMPLETE CBC W/AUTO DIFF WBC: CPT

## 2024-06-22 PROCEDURE — 92612 ENDOSCOPY SWALLOW (FEES) VID: CPT | Performed by: SPEECH-LANGUAGE PATHOLOGIST

## 2024-06-22 PROCEDURE — 36415 COLL VENOUS BLD VENIPUNCTURE: CPT

## 2024-06-22 PROCEDURE — 6360000002 HC RX W HCPCS: Performed by: STUDENT IN AN ORGANIZED HEALTH CARE EDUCATION/TRAINING PROGRAM

## 2024-06-22 PROCEDURE — 92610 EVALUATE SWALLOWING FUNCTION: CPT | Performed by: SPEECH-LANGUAGE PATHOLOGIST

## 2024-06-22 PROCEDURE — 1100000003 HC PRIVATE W/ TELEMETRY

## 2024-06-22 PROCEDURE — 82962 GLUCOSE BLOOD TEST: CPT

## 2024-06-22 PROCEDURE — 2700000000 HC OXYGEN THERAPY PER DAY

## 2024-06-22 PROCEDURE — 2580000003 HC RX 258: Performed by: STUDENT IN AN ORGANIZED HEALTH CARE EDUCATION/TRAINING PROGRAM

## 2024-06-22 PROCEDURE — 94761 N-INVAS EAR/PLS OXIMETRY MLT: CPT

## 2024-06-22 RX ADMIN — SODIUM CHLORIDE, PRESERVATIVE FREE 10 ML: 5 INJECTION INTRAVENOUS at 21:10

## 2024-06-22 RX ADMIN — SODIUM CHLORIDE, PRESERVATIVE FREE 10 ML: 5 INJECTION INTRAVENOUS at 10:21

## 2024-06-22 RX ADMIN — PIPERACILLIN AND TAZOBACTAM 3375 MG: 3; .375 INJECTION, POWDER, LYOPHILIZED, FOR SOLUTION INTRAVENOUS at 21:15

## 2024-06-22 RX ADMIN — POLYMYXIN B SULFATE AND TRIMETHOPRIM 1 DROP: 10000; 1 SOLUTION OPHTHALMIC at 12:21

## 2024-06-22 RX ADMIN — PIPERACILLIN AND TAZOBACTAM 3375 MG: 3; .375 INJECTION, POWDER, LYOPHILIZED, FOR SOLUTION INTRAVENOUS at 05:09

## 2024-06-22 RX ADMIN — POLYMYXIN B SULFATE AND TRIMETHOPRIM 1 DROP: 10000; 1 SOLUTION OPHTHALMIC at 05:11

## 2024-06-22 RX ADMIN — PIPERACILLIN AND TAZOBACTAM 3375 MG: 3; .375 INJECTION, POWDER, LYOPHILIZED, FOR SOLUTION INTRAVENOUS at 13:54

## 2024-06-22 RX ADMIN — ATORVASTATIN CALCIUM 40 MG: 40 TABLET, FILM COATED ORAL at 10:19

## 2024-06-22 RX ADMIN — SODIUM CHLORIDE: 9 INJECTION, SOLUTION INTRAVENOUS at 13:52

## 2024-06-22 RX ADMIN — CARBIDOPA AND LEVODOPA 1 TABLET: 25; 100 TABLET ORAL at 10:20

## 2024-06-22 RX ADMIN — METOPROLOL TARTRATE 12.5 MG: 25 TABLET, FILM COATED ORAL at 10:19

## 2024-06-22 RX ADMIN — ENOXAPARIN SODIUM 90 MG: 100 INJECTION SUBCUTANEOUS at 10:20

## 2024-06-22 RX ADMIN — CARBIDOPA AND LEVODOPA 1 TABLET: 25; 100 TABLET ORAL at 21:09

## 2024-06-22 RX ADMIN — ARFORMOTEROL TARTRATE: 15 SOLUTION RESPIRATORY (INHALATION) at 21:15

## 2024-06-22 RX ADMIN — ASPIRIN 81 MG: 81 TABLET, COATED ORAL at 10:19

## 2024-06-22 RX ADMIN — POLYMYXIN B SULFATE AND TRIMETHOPRIM 1 DROP: 10000; 1 SOLUTION OPHTHALMIC at 17:25

## 2024-06-22 RX ADMIN — ENOXAPARIN SODIUM 90 MG: 100 INJECTION SUBCUTANEOUS at 21:10

## 2024-06-22 RX ADMIN — ARFORMOTEROL TARTRATE: 15 SOLUTION RESPIRATORY (INHALATION) at 08:22

## 2024-06-22 RX ADMIN — AZITHROMYCIN MONOHYDRATE 500 MG: 500 INJECTION, POWDER, LYOPHILIZED, FOR SOLUTION INTRAVENOUS at 13:47

## 2024-06-22 RX ADMIN — FINASTERIDE 5 MG: 5 TABLET, FILM COATED ORAL at 10:19

## 2024-06-22 RX ADMIN — METOPROLOL TARTRATE 12.5 MG: 25 TABLET, FILM COATED ORAL at 21:09

## 2024-06-22 RX ADMIN — CARBIDOPA AND LEVODOPA 1 TABLET: 25; 100 TABLET ORAL at 13:43

## 2024-06-22 NOTE — PLAN OF CARE
Speech LAnguage Pathology EVALUATION    Patient: Tony Madsen (73 y.o. male)  Date: 6/22/2024  Primary Diagnosis: Community acquired pneumonia, unspecified laterality [J18.9]  Acute pneumonia [J18.9]       Precautions: aspiration, fall                    ASSESSMENT :  Patient presents with suspected at least mild oropharyngeal dysphagia.  Overt coughing noted intermittently with all consistencies, however, also present outside of PO so difficult to ascertain relationship to aspiration vs cough related to pneumonia.  In light of history of PD, recent stroke in 2024, and chest CT revealing: \"Moderate left and mild right lower lobe pneumonia. Debris in the trachea.Aspiration pneumonia is likely\", would recommend FEES for further objective assessment of swallow function.      Patient will benefit from skilled intervention to address the above impairments.     PLAN :  Recommendations and Planned Interventions:  Diet:   FEES today for further assessment of swallow function          Acute SLP Services: Yes, patient will be followed by speech-language pathology 3x/week to address goals. Patient's rehabilitation potential is considered to be Fair.    Discharge Recommendations: Continue to assess pending progress     SUBJECTIVE:   Patient stated, “no it just started.” when asked if he coughs frequently when he eats    OBJECTIVE:   No past medical history on file.No past surgical history on file.  Prior Level of Function/Home Situation:   Social/Functional History  Lives With: Significant other  Type of Home: House  Home Layout: One level  Home Access: Stairs to enter with rails  Entrance Stairs - Number of Steps: 1  Bathroom Equipment: None  Home Equipment: Wheelchair - Manual, Rollator  Receives Help From: Family  ADL Assistance: Needs assistance  Toileting: Needs assistance  Homemaking Assistance: Needs assistance  Homemaking Responsibilities: No  Ambulation Assistance: Needs assistance (Ambulatory with rollator and  plan of care    Thank you,  Sally Onofre M.CD. CCC-SLP   Minutes: 18

## 2024-06-22 NOTE — PROGRESS NOTES
End of Shift Note    Bedside shift change report given to dionisio braun  (oncoming nurse) by Kadie Lira, RN .        Shift worked:  7p-7a   Shift summary and any significant changes:       Us low ext-neg-remains npo due to lethargic and coughing/asp precautions     Concerns for physician to address:  na   Zone phone for oncoming shift:   [phone]     Patient Information  Tony Madsen  73 y.o.  6/21/2024  7:25 AM by Hailey Espinal MD. Tony Madsen was admitted from [facility]    Problem List  Patient Active Problem List    Diagnosis Date Noted    Acute pneumonia 06/21/2024     No past medical history on file.    Core Measures:  CVA: nono  CHF: nono  PNA: tonny    Activity:  Level of Assistance: Dependent, patient does less than 25%  Number times ambulated in hallways past shift: 0  0  Number of times OOB to chair past shift: 0  0    Cardiac:   Cardiac Monitoring: yes, yes nsr 90s-dnr    Access:   Current line(s): PIVpiv x4-zosyn ivpb    Respiratory:   O2 Device: Nasal [id number] nc    GI:incont     Current diet:  Diet NPO  Tolerating current diet: Yes    Pain Management:   Patient states pain is manageable on current regimen: yes    Skin:  Craig Scale Score: 12  Interventions: turn q2turn and reposition e1e-gca at 45 degrees, heels elevated off of bed  Pressure injury: nono-reddness    Patient Safety:  Fall Score: Ceballos Total Score: 60  Interventions: bed alarmyes  Self-release roll belt: Nono  Dexterity to release roll belt: N/A na  (must document dexterity  here by stating Yes or No here, otherwise this is a restraint and must follow restraint documentation policy.)    DVT prophylaxis:  DVT prophylaxis: medslovenox    Active Consults:  IP CONSULT TO PHARMACY    Length of Stay:  Expected LOS: 3  Actual LOS: 1    Kadie Lira, RN

## 2024-06-22 NOTE — PROGRESS NOTES
End of Shift Note    Bedside shift change report given to REGIS Lemus(oncoming nurse) by Ayaka Hannah RN .        Shift worked: Days   Shift summary and any significant changes:    IV ABX administered   ST came to bedside and completed FEES   Q 2 turns documented    Concerns for physician to address:  None   Zone phone for oncoming shift:   [phone]     Patient Information  Tony Madsen  73 y.o.  6/21/2024  7:25 AM by Hailey Espinal MD. Tony Madsen was admitted from [facility]    Problem List  Patient Active Problem List    Diagnosis Date Noted    Acute pneumonia 06/21/2024     No past medical history on file.    Core Measures:  CVA: nono  CHF: nono  PNA: tonny    Activity:  Level of Assistance: Dependent, patient does less than 25%  Number times ambulated in hallways past shift: 0  0  Number of times OOB to chair past shift: 0  0    Cardiac:   Cardiac Monitoring: yes, yes nsr 90s-dnr    Access:   Current line(s): PIVpiv x4-zosyn ivpb    Respiratory:   O2 Device: Nasal jegailx1e nc    GI:incont     Current diet:  ADULT DIET; Easy to Chew  Tolerating current diet: Yes    Pain Management:   Patient states pain is manageable on current regimen: yes    Skin:  Craig Scale Score: 13  Interventions: turn q2turn and reposition o9o-jah at 45 degrees, heels elevated off of bed  Pressure injury: nono-reddness    Patient Safety:  Fall Score: Ceballos Total Score: 60  Interventions: bed alarmyes  Self-release roll belt: Nono  Dexterity to release roll belt: N/A na  (must document dexterity  here by stating Yes or No here, otherwise this is a restraint and must follow restraint documentation policy.)    DVT prophylaxis:  DVT prophylaxis: medslovenox    Active Consults:  IP CONSULT TO PHARMACY    Length of Stay:  Expected LOS: 3  Actual LOS: 1    Ayaka Hannah RN

## 2024-06-22 NOTE — PROGRESS NOTES
Hospitalist Progress Note    NAME:   Tony Madsen   : 1950   MRN: [id number]     Date/Time: 2024 1:37 PM  Patient PCP: No, Pcp    Estimated discharge date:  Barriers:clinical improvement     Assessment / Plan:    Severe sepsis likely from aspiration pneumonia   Elevated lactate 3.19--0.86   Generalized weakness may be due to above      CXR -No acute process   UA unremarkable   CTA chest :Moderate left and mild right lower lobe pneumonia. Debris in the trachea.Aspiration pneumonia is likely.     Plan:  S/p septic bolus  Leucocytosis trending up and had Tamx 100, tachycardia resolved   Patient more alert and able to answer his name   Continue Zosyn  and Azithromycin  , will allow atleast 48 hrs for antibiotics to work    Blood culture : No growth so far X1 day   S/p FESS :Pharyngeal swallow was functional for age with occasional spillage to the pyriforms prior to swallow initiation but otherwise adequate white out phase of the swallow.   SLP recommended :Diet: Easy to chew/ thin liquids         Parkinson's Dementia   H/o stroke 2024 with residual deficit on right side   -Oral medications resumed  -Patient is on Eliquis. Wife is unaware about medications.  -Bilateral LE edema : negative for  DVT            Medical Decision Making:   I personally reviewed labs: yes   I personally reviewed imaging:yes   I personally reviewed EKG  Toxic drug monitoring: Zosyn   Discussed case with: Patient, RN     Code Status: DNR  DVT Prophylaxis:   Baseline: oriented to himself and person, walks with walker and roller aid     Subjective:     Chief Complaint / Reason for Physician Visit  \"Alert and RN was planning for bedside swallow evaluation \".  Discussed with RN events overnight.       Objective:     VITALS:   Last 24hrs VS reviewed since prior progress note. Most recent are:  Patient Vitals for the past 24 hrs:   BP Temp Temp src Pulse Resp SpO2   24 1022 121/84 -- -- 85 -- --   24 0842 -- -- --

## 2024-06-22 NOTE — PLAN OF CARE
Problem: Discharge Planning  Goal: Discharge to home or other facility with appropriate resources  6/21/2024 2329 by Kadie Lira RN  Outcome: Progressing  6/21/2024 1837 by Ayaka Hannah RN  Outcome: Progressing     Problem: Pain  Goal: Verbalizes/displays adequate comfort level or baseline comfort level  6/21/2024 2329 by Kadie Lira RN  Outcome: Progressing  6/21/2024 1837 by Ayaka Hannah RN  Outcome: Progressing     Problem: Safety - Adult  Goal: Free from fall injury  6/21/2024 2329 by Kadie Lira RN  Outcome: Progressing  6/21/2024 1837 by Ayaka Hannah RN  Outcome: Progressing     Problem: Skin/Tissue Integrity  Goal: Absence of new skin breakdown  Description: 1.  Monitor for areas of redness and/or skin breakdown  2.  Assess vascular access sites hourly  3.  Every 4-6 hours minimum:  Change oxygen saturation probe site  4.  Every 4-6 hours:  If on nasal continuous positive airway pressure, respiratory therapy assess nares and determine need for appliance change or resting period.  6/21/2024 2329 by Kadie Lira RN  Outcome: Progressing  6/21/2024 1837 by Ayaka Hannah RN  Outcome: Progressing     Problem: Respiratory - Adult  Goal: Achieves optimal ventilation and oxygenation  6/21/2024 2329 by Kadie Lira RN  Outcome: Progressing  6/21/2024 2205 by Sarah Graff, RT  Outcome: Progressing  6/21/2024 1837 by Ayaka Hannah RN  Outcome: Progressing     Problem: Cardiovascular - Adult  Goal: Maintains optimal cardiac output and hemodynamic stability  6/21/2024 2329 by Kadie Lira RN  Outcome: Progressing  6/21/2024 1837 by Ayaka Hannah RN  Outcome: Progressing  Goal: Absence of cardiac dysrhythmias or at baseline  6/21/2024 2329 by Kadie Lira RN  Outcome: Progressing  6/21/2024 1837 by Ayaka Hannah RN  Outcome: Progressing     Problem: Skin/Tissue Integrity - Adult  Goal: Skin integrity remains intact  6/21/2024 2329 by Kadie Lira RN  Outcome:

## 2024-06-22 NOTE — PLAN OF CARE
Speech Language Pathology  Flexible Endoscopic Evaluation of Swallowing-FEES  Patient: Tony Madsen (73 y.o. male)  Date: 6/22/2024  Primary Diagnosis: Community acquired pneumonia, unspecified laterality [J18.9]  Acute pneumonia [J18.9]       Precautions: aspiration, fall                      ASSESSMENT :  Patient presents with slow but adequate mastication with full oral clearance.  Pharyngeal swallow was functional for age with occasional spillage to the pyriforms prior to swallow initiation but otherwise adequate white out phase of the swallow.  Presumed mildly reduced pharyngeal squeeze as evidenced by trace pharyngeal residue with all consistencies that is typical of age.  No penetration or aspiration observed including with large, rapid successive straw sips of thin liquids.  Frequent coughing was noted in the absence of airway compromise.  Mucous was noted to be coughed in and out of the trachea during a strong coughing episode when scope first passed, but no pooled pharyngeal secretions were present.     Patient will benefit from skilled intervention to address the above impairments.       PLAN :  Recommendations and Planned Interventions:  Diet: Easy to chew/ thin liquids  Note patient frequently coughs in the absence of aspiration.   Will follow for diet tolerance and any additional modifications       Acute SLP Services: Yes, patient will be followed by speech-language pathology 3x/week to address goals. Patient's rehabilitation potential is considered to be Good.  Discharge Recommendations: Continue to assess pending progress     SUBJECTIVE:   Patient stated “can I have that cookie?”. Referring to the fredi pennington after study finished.     OBJECTIVE:   No past medical history on file.No past surgical history on file.  Prior Level of Function/Home Situation:   Social/Functional History  Lives With: Significant other  Type of Home: House  Home Layout: One level  Home Access: Stairs to enter with

## 2024-06-23 LAB
ANION GAP SERPL CALC-SCNC: 4 MMOL/L (ref 5–15)
BASOPHILS # BLD: 0 K/UL (ref 0–0.1)
BASOPHILS NFR BLD: 0 % (ref 0–1)
BUN SERPL-MCNC: 14 MG/DL (ref 6–20)
BUN/CREAT SERPL: 12 (ref 12–20)
CALCIUM SERPL-MCNC: 9 MG/DL (ref 8.5–10.1)
CHLORIDE SERPL-SCNC: 113 MMOL/L (ref 97–108)
CO2 SERPL-SCNC: 25 MMOL/L (ref 21–32)
CREAT SERPL-MCNC: 1.18 MG/DL (ref 0.7–1.3)
DIFFERENTIAL METHOD BLD: ABNORMAL
EKG ATRIAL RATE: 117 BPM
EKG DIAGNOSIS: NORMAL
EKG P AXIS: 28 DEGREES
EKG P-R INTERVAL: 122 MS
EKG Q-T INTERVAL: 368 MS
EKG QRS DURATION: 62 MS
EKG QTC CALCULATION (BAZETT): 513 MS
EKG R AXIS: 39 DEGREES
EKG T AXIS: 79 DEGREES
EKG VENTRICULAR RATE: 117 BPM
EOSINOPHIL # BLD: 0 K/UL (ref 0–0.4)
EOSINOPHIL NFR BLD: 0 % (ref 0–7)
ERYTHROCYTE [DISTWIDTH] IN BLOOD BY AUTOMATED COUNT: 14 % (ref 11.5–14.5)
GLUCOSE BLD STRIP.AUTO-MCNC: 100 MG/DL (ref 65–117)
GLUCOSE BLD STRIP.AUTO-MCNC: 107 MG/DL (ref 65–117)
GLUCOSE BLD STRIP.AUTO-MCNC: 111 MG/DL (ref 65–117)
GLUCOSE BLD STRIP.AUTO-MCNC: 85 MG/DL (ref 65–117)
GLUCOSE SERPL-MCNC: 94 MG/DL (ref 65–100)
HCT VFR BLD AUTO: 39.2 % (ref 36.6–50.3)
HGB BLD-MCNC: 12.3 G/DL (ref 12.1–17)
IMM GRANULOCYTES # BLD AUTO: 0.1 K/UL (ref 0–0.04)
IMM GRANULOCYTES NFR BLD AUTO: 1 % (ref 0–0.5)
LYMPHOCYTES # BLD: 1.3 K/UL (ref 0.8–3.5)
LYMPHOCYTES NFR BLD: 14 % (ref 12–49)
MCH RBC QN AUTO: 29.1 PG (ref 26–34)
MCHC RBC AUTO-ENTMCNC: 31.4 G/DL (ref 30–36.5)
MCV RBC AUTO: 92.7 FL (ref 80–99)
MONOCYTES # BLD: 0.7 K/UL (ref 0–1)
MONOCYTES NFR BLD: 7 % (ref 5–13)
NEUTS SEG # BLD: 7.7 K/UL (ref 1.8–8)
NEUTS SEG NFR BLD: 78 % (ref 32–75)
NRBC # BLD: 0 K/UL (ref 0–0.01)
NRBC BLD-RTO: 0 PER 100 WBC
PLATELET # BLD AUTO: 169 K/UL (ref 150–400)
PMV BLD AUTO: 10.1 FL (ref 8.9–12.9)
POTASSIUM SERPL-SCNC: 3.8 MMOL/L (ref 3.5–5.1)
RBC # BLD AUTO: 4.23 M/UL (ref 4.1–5.7)
SERVICE CMNT-IMP: NORMAL
SODIUM SERPL-SCNC: 142 MMOL/L (ref 136–145)
WBC # BLD AUTO: 9.8 K/UL (ref 4.1–11.1)

## 2024-06-23 PROCEDURE — 6360000002 HC RX W HCPCS: Performed by: STUDENT IN AN ORGANIZED HEALTH CARE EDUCATION/TRAINING PROGRAM

## 2024-06-23 PROCEDURE — 94761 N-INVAS EAR/PLS OXIMETRY MLT: CPT

## 2024-06-23 PROCEDURE — 85025 COMPLETE CBC W/AUTO DIFF WBC: CPT

## 2024-06-23 PROCEDURE — 6370000000 HC RX 637 (ALT 250 FOR IP): Performed by: STUDENT IN AN ORGANIZED HEALTH CARE EDUCATION/TRAINING PROGRAM

## 2024-06-23 PROCEDURE — 94640 AIRWAY INHALATION TREATMENT: CPT

## 2024-06-23 PROCEDURE — 1100000003 HC PRIVATE W/ TELEMETRY

## 2024-06-23 PROCEDURE — 80048 BASIC METABOLIC PNL TOTAL CA: CPT

## 2024-06-23 PROCEDURE — 2580000003 HC RX 258: Performed by: STUDENT IN AN ORGANIZED HEALTH CARE EDUCATION/TRAINING PROGRAM

## 2024-06-23 PROCEDURE — 36415 COLL VENOUS BLD VENIPUNCTURE: CPT

## 2024-06-23 PROCEDURE — 82962 GLUCOSE BLOOD TEST: CPT

## 2024-06-23 RX ADMIN — POLYMYXIN B SULFATE AND TRIMETHOPRIM 1 DROP: 10000; 1 SOLUTION OPHTHALMIC at 18:53

## 2024-06-23 RX ADMIN — PIPERACILLIN AND TAZOBACTAM 3375 MG: 3; .375 INJECTION, POWDER, LYOPHILIZED, FOR SOLUTION INTRAVENOUS at 14:58

## 2024-06-23 RX ADMIN — METOPROLOL TARTRATE 12.5 MG: 25 TABLET, FILM COATED ORAL at 08:40

## 2024-06-23 RX ADMIN — PIPERACILLIN AND TAZOBACTAM 3375 MG: 3; .375 INJECTION, POWDER, LYOPHILIZED, FOR SOLUTION INTRAVENOUS at 05:19

## 2024-06-23 RX ADMIN — AZITHROMYCIN MONOHYDRATE 500 MG: 500 INJECTION, POWDER, LYOPHILIZED, FOR SOLUTION INTRAVENOUS at 13:49

## 2024-06-23 RX ADMIN — ASPIRIN 81 MG: 81 TABLET, COATED ORAL at 08:40

## 2024-06-23 RX ADMIN — POLYMYXIN B SULFATE AND TRIMETHOPRIM 1 DROP: 10000; 1 SOLUTION OPHTHALMIC at 12:06

## 2024-06-23 RX ADMIN — CARBIDOPA AND LEVODOPA 1 TABLET: 25; 100 TABLET ORAL at 20:52

## 2024-06-23 RX ADMIN — ARFORMOTEROL TARTRATE: 15 SOLUTION RESPIRATORY (INHALATION) at 20:58

## 2024-06-23 RX ADMIN — SODIUM CHLORIDE, PRESERVATIVE FREE 10 ML: 5 INJECTION INTRAVENOUS at 20:53

## 2024-06-23 RX ADMIN — PIPERACILLIN AND TAZOBACTAM 3375 MG: 3; .375 INJECTION, POWDER, LYOPHILIZED, FOR SOLUTION INTRAVENOUS at 21:01

## 2024-06-23 RX ADMIN — CARBIDOPA AND LEVODOPA 1 TABLET: 25; 100 TABLET ORAL at 08:40

## 2024-06-23 RX ADMIN — ENOXAPARIN SODIUM 90 MG: 100 INJECTION SUBCUTANEOUS at 08:40

## 2024-06-23 RX ADMIN — FINASTERIDE 5 MG: 5 TABLET, FILM COATED ORAL at 08:40

## 2024-06-23 RX ADMIN — SODIUM CHLORIDE, PRESERVATIVE FREE 10 ML: 5 INJECTION INTRAVENOUS at 08:40

## 2024-06-23 RX ADMIN — POLYMYXIN B SULFATE AND TRIMETHOPRIM 1 DROP: 10000; 1 SOLUTION OPHTHALMIC at 00:33

## 2024-06-23 RX ADMIN — ARFORMOTEROL TARTRATE: 15 SOLUTION RESPIRATORY (INHALATION) at 07:49

## 2024-06-23 RX ADMIN — CARBIDOPA AND LEVODOPA 1 TABLET: 25; 100 TABLET ORAL at 14:53

## 2024-06-23 RX ADMIN — ATORVASTATIN CALCIUM 40 MG: 40 TABLET, FILM COATED ORAL at 08:40

## 2024-06-23 RX ADMIN — METOPROLOL TARTRATE 12.5 MG: 25 TABLET, FILM COATED ORAL at 20:52

## 2024-06-23 RX ADMIN — POLYMYXIN B SULFATE AND TRIMETHOPRIM 1 DROP: 10000; 1 SOLUTION OPHTHALMIC at 05:19

## 2024-06-23 RX ADMIN — ENOXAPARIN SODIUM 90 MG: 100 INJECTION SUBCUTANEOUS at 20:52

## 2024-06-23 NOTE — PROGRESS NOTES
End of Shift Note    Bedside shift change report given to REGIS Be (oncoming nurse) by Emily Jang LPN .        Shift worked: Nights   Shift summary and any significant changes:    IV ABX administered; Q2h turns completed; Case Management following; Morning labs completed; No acute changes overnight.   Concerns for physician to address:  None   Zone phone for oncoming shift:   [phone]     Patient Information  Tony Madsen  73 y.o.  6/21/2024  7:25 AM by Hailey Espinal MD. Tony Madsen was admitted from [facility]    Problem List  Patient Active Problem List    Diagnosis Date Noted    Acute pneumonia 06/21/2024     No past medical history on file.    Core Measures:  CVA: no  CHF: no  PNA: yes    Activity:  Level of Assistance: Dependent, patient does less than 25%  Number times ambulated in hallways past shift: 0  0  Number of times OOB to chair past shift: 0  0    Cardiac:   Cardiac Monitoring: yes, yes nsr 90s-dnr    Access:   Current line(s): PIV     Respiratory:   O2 Device: None (Room air)  GI:incont     Current diet:  ADULT DIET; Easy to Chew  Tolerating current diet: Yes    Pain Management:   Patient states pain is manageable on current regimen: yes    Skin:  Craig Scale Score: 14  Interventions: turn q2turn and reposition [id number] at 45 degrees, heels elevated off of bed  Pressure injury: nono-reddness    Patient Safety:  Fall Score: Ceballos Total Score: 60  Interventions: bed alarm yes  Self-release roll belt: No   Dexterity to release roll belt: N/A    (must document dexterity  here by stating Yes or No here, otherwise this is a restraint and must follow restraint documentation policy.)    DVT prophylaxis:  DVT prophylaxis: meds; Lovenox    Active Consults:  IP CONSULT TO PHARMACY    Length of Stay:  Expected LOS: 3  Actual LOS: 2    Emily Jang LPN

## 2024-06-23 NOTE — PLAN OF CARE
Problem: Discharge Planning  Goal: Discharge to home or other facility with appropriate resources  6/22/2024 2329 by Emily Jang LPN  Outcome: Progressing  6/22/2024 1046 by Ayaka Hannah RN  Outcome: Progressing     Problem: Pain  Goal: Verbalizes/displays adequate comfort level or baseline comfort level  6/22/2024 2329 by Emily Jang LPN  Outcome: Progressing  6/22/2024 1046 by Ayaka Hannah RN  Outcome: Progressing     Problem: Safety - Adult  Goal: Free from fall injury  6/22/2024 2329 by Emily Jang LPN  Outcome: Progressing  6/22/2024 1046 by Ayaka Hannah RN  Outcome: Progressing     Problem: Skin/Tissue Integrity  Goal: Absence of new skin breakdown  Description: 1.  Monitor for areas of redness and/or skin breakdown  2.  Assess vascular access sites hourly  3.  Every 4-6 hours minimum:  Change oxygen saturation probe site  4.  Every 4-6 hours:  If on nasal continuous positive airway pressure, respiratory therapy assess nares and determine need for appliance change or resting period.  6/22/2024 2329 by Emily Jang LPN  Outcome: Progressing  6/22/2024 1046 by Ayaka Hannah RN  Outcome: Progressing     Problem: Respiratory - Adult  Goal: Achieves optimal ventilation and oxygenation  6/22/2024 2329 by Emily Jang LPN  Outcome: Progressing  6/22/2024 2113 by Sarah Graff, RT  Outcome: Progressing  6/22/2024 1046 by Ayaka Hannah RN  Outcome: Progressing     Problem: Cardiovascular - Adult  Goal: Maintains optimal cardiac output and hemodynamic stability  6/22/2024 2329 by Emily Jang LPN  Outcome: Progressing  6/22/2024 1046 by Ayaka Hannah RN  Outcome: Progressing  Goal: Absence of cardiac dysrhythmias or at baseline  6/22/2024 2329 by Emily Jang LPN  Outcome: Progressing  6/22/2024 1046 by Ayaka Hannah RN  Outcome: Progressing     Problem: Hematologic - Adult  Goal: Maintains hematologic stability  6/22/2024 2329 by Emily Jang  LPN  Outcome: Progressing  6/22/2024 1046 by Ayaka Hannah, RN  Outcome: Progressing     Problem: SLP Adult - Impaired Swallowing  Goal: By Discharge: Advance to least restrictive diet without signs or symptoms of aspiration for planned discharge setting.  See evaluation for individualized goals.  Description: Speech therapy goals  Initiated 6/22/2024   1. Patient will participate in FEES study today for further objective assessment of swallow MET 6/22/2024   2. Patient will tolerate easy to chew thin liquid diet without s/s of aspiration or adverse effects within 7 days   6/22/2024 1200 by Sally Onofre, SLP  Outcome: Progressing  6/22/2024 1127 by Sally Onofre, SLP  Outcome: Progressing

## 2024-06-23 NOTE — PROGRESS NOTES
Hospitalist Progress Note    NAME:   Tony Madsen   : 1950   MRN: [id number]     Date/Time: 2024 12:39 PM  Patient PCP: No, Pcp    Estimated discharge date:  Barriers:clinical improvement     Assessment / Plan:    Severe sepsis likely from aspiration pneumonia   Elevated lactate 3.19--0.86   Generalized weakness may be due to above      CXR -No acute process   UA unremarkable   CTA chest :Moderate left and mild right lower lobe pneumonia. Debris in the trachea.Aspiration pneumonia is likely.  S/p septic bolus  S/p FESS :Pharyngeal swallow was functional for age with occasional spillage to the pyriforms prior to swallow initiation but otherwise adequate white out phase of the swallow.   SLP recommended :Diet: Easy to chew/ thin liquids      Plan:  Remained afebrile   Leucocytosis resolved   Patient more alert and able to answer   Continue Zosyn  and Azithromycin     Blood culture : No growth so far X2 day   PT/OT evaluation       Parkinson's Dementia   H/o stroke 2024 with residual deficit on right side   -Oral medications resumed  -Patient is on Eliquis. Wife is unaware about medications.  -Bilateral LE edema : negative for  DVT            Medical Decision Making:   I personally reviewed labs: yes   I personally reviewed imaging:yes   I personally reviewed EKG  Toxic drug monitoring: Zosyn   Discussed case with: Patient, RN     Code Status: DNR  DVT Prophylaxis:   Baseline: oriented to himself and person, walks with walker and roller aid     Subjective:     Chief Complaint / Reason for Physician Visit  \"Alert and states feeling better  \".  Discussed with RN events overnight.       Objective:     VITALS:   Last 24hrs VS reviewed since prior progress note. Most recent are:  Patient Vitals for the past 24 hrs:   BP Temp Temp src Pulse Resp SpO2   24 0751 -- -- -- 64 16 92 %   24 0741 (!) 148/88 97.9 °F (36.6 °C) Oral 85 18 92 %   244 -- -- -- 69 20 100 %   24 --

## 2024-06-23 NOTE — PROGRESS NOTES
End of Shift Note    Bedside shift change report given to REGIS eLmus (oncoming nurse) by Indiana Lindo RN .        Shift worked: Days   Shift summary and any significant changes:    IV ABX administered; Q2h turns completed   Concerns for physician to address:  None   Zone phone for oncoming shift:   [phone]     Patient Information  Tony Madsen  73 y.o.  6/21/2024  7:25 AM by Hailey Espinal MD. Tony Madsen was admitted from [facility]    Problem List  Patient Active Problem List    Diagnosis Date Noted    Acute pneumonia 06/21/2024     No past medical history on file.    Core Measures:  CVA: no  CHF: no  PNA: yes    Activity:  Level of Assistance: Dependent, patient does less than 25%  Number times ambulated in hallways past shift: 0  0  Number of times OOB to chair past shift: 0  0    Cardiac:   Cardiac Monitoring: yes, yes nsr 90s-dnr    Access:   Current line(s): PIV     Respiratory:   O2 Device: None (Room air)  GI:incont  Last BM (including prior to admit): 06/23/24  Current diet:  ADULT DIET; Easy to Chew  Tolerating current diet: Yes    Pain Management:   Patient states pain is manageable on current regimen: yes    Skin:  Craig Scale Score: 14  Interventions: turn q2turn and reposition [id number] at 45 degrees, heels elevated off of bed  Pressure injury: nono-reddness    Patient Safety:  Fall Score: Ceballos Total Score: 60  Interventions: bed alarm yes  Self-release roll belt: No   Dexterity to release roll belt: N/A    (must document dexterity  here by stating Yes or No here, otherwise this is a restraint and must follow restraint documentation policy.)    DVT prophylaxis:  DVT prophylaxis: meds; Lovenox    Active Consults:  IP CONSULT TO PHARMACY    Length of Stay:  Expected LOS: 4  Actual LOS: 2    Indiana Lindo RN

## 2024-06-23 NOTE — PLAN OF CARE
Problem: Discharge Planning  Goal: Discharge to home or other facility with appropriate resources  6/23/2024 1059 by Indiana Lindo RN  Outcome: Progressing  6/22/2024 2329 by Emily Jang LPN  Outcome: Progressing     Problem: Pain  Goal: Verbalizes/displays adequate comfort level or baseline comfort level  6/23/2024 1059 by Indiana Lindo RN  Outcome: Progressing  6/22/2024 2329 by Emily Jang LPN  Outcome: Progressing     Problem: Safety - Adult  Goal: Free from fall injury  6/23/2024 1059 by Indiana Lindo RN  Outcome: Progressing  6/22/2024 2329 by Emily Jang LPN  Outcome: Progressing     Problem: Skin/Tissue Integrity  Goal: Absence of new skin breakdown  Description: 1.  Monitor for areas of redness and/or skin breakdown  2.  Assess vascular access sites hourly  3.  Every 4-6 hours minimum:  Change oxygen saturation probe site  4.  Every 4-6 hours:  If on nasal continuous positive airway pressure, respiratory therapy assess nares and determine need for appliance change or resting period.  6/23/2024 1059 by Indiana Lindo RN  Outcome: Progressing  6/22/2024 2329 by Emily Jang LPN  Outcome: Progressing     Problem: Respiratory - Adult  Goal: Achieves optimal ventilation and oxygenation  6/23/2024 1059 by Indiana Lindo RN  Outcome: Progressing  6/22/2024 2329 by Emily Jang LPN  Outcome: Progressing  6/22/2024 2113 by Sarah Graff, RT  Outcome: Progressing     Problem: Cardiovascular - Adult  Goal: Maintains optimal cardiac output and hemodynamic stability  6/23/2024 1059 by Indiana Lindo RN  Outcome: Progressing  6/22/2024 2329 by Emily Jang LPN  Outcome: Progressing  Goal: Absence of cardiac dysrhythmias or at baseline  6/23/2024 1059 by Indiana Lindo RN  Outcome: Progressing  6/22/2024 2329 by Emily Jang LPN  Outcome: Progressing     Problem: Skin/Tissue Integrity - Adult  Goal: Skin integrity remains intact  6/23/2024 1059 by

## 2024-06-24 LAB
ANION GAP SERPL CALC-SCNC: 3 MMOL/L (ref 5–15)
BASOPHILS # BLD: 0 K/UL (ref 0–0.1)
BASOPHILS NFR BLD: 0 % (ref 0–1)
BUN SERPL-MCNC: 11 MG/DL (ref 6–20)
BUN/CREAT SERPL: 9 (ref 12–20)
CALCIUM SERPL-MCNC: 9.1 MG/DL (ref 8.5–10.1)
CHLORIDE SERPL-SCNC: 113 MMOL/L (ref 97–108)
CO2 SERPL-SCNC: 26 MMOL/L (ref 21–32)
CREAT SERPL-MCNC: 1.17 MG/DL (ref 0.7–1.3)
DIFFERENTIAL METHOD BLD: ABNORMAL
EOSINOPHIL # BLD: 0.1 K/UL (ref 0–0.4)
EOSINOPHIL NFR BLD: 1 % (ref 0–7)
ERYTHROCYTE [DISTWIDTH] IN BLOOD BY AUTOMATED COUNT: 13.7 % (ref 11.5–14.5)
GLUCOSE BLD STRIP.AUTO-MCNC: 101 MG/DL (ref 65–117)
GLUCOSE BLD STRIP.AUTO-MCNC: 112 MG/DL (ref 65–117)
GLUCOSE BLD STRIP.AUTO-MCNC: 80 MG/DL (ref 65–117)
GLUCOSE BLD STRIP.AUTO-MCNC: 84 MG/DL (ref 65–117)
GLUCOSE BLD STRIP.AUTO-MCNC: 85 MG/DL (ref 65–117)
GLUCOSE SERPL-MCNC: 97 MG/DL (ref 65–100)
HCT VFR BLD AUTO: 40.1 % (ref 36.6–50.3)
HGB BLD-MCNC: 12.8 G/DL (ref 12.1–17)
IMM GRANULOCYTES # BLD AUTO: 0 K/UL (ref 0–0.04)
IMM GRANULOCYTES NFR BLD AUTO: 0 % (ref 0–0.5)
LYMPHOCYTES # BLD: 0.6 K/UL (ref 0.8–3.5)
LYMPHOCYTES NFR BLD: 10 % (ref 12–49)
MCH RBC QN AUTO: 29.5 PG (ref 26–34)
MCHC RBC AUTO-ENTMCNC: 31.9 G/DL (ref 30–36.5)
MCV RBC AUTO: 92.4 FL (ref 80–99)
MONOCYTES # BLD: 0.5 K/UL (ref 0–1)
MONOCYTES NFR BLD: 8 % (ref 5–13)
NEUTS SEG # BLD: 4.9 K/UL (ref 1.8–8)
NEUTS SEG NFR BLD: 81 % (ref 32–75)
NRBC # BLD: 0 K/UL (ref 0–0.01)
NRBC BLD-RTO: 0 PER 100 WBC
PLATELET # BLD AUTO: 174 K/UL (ref 150–400)
PMV BLD AUTO: 10.2 FL (ref 8.9–12.9)
POTASSIUM SERPL-SCNC: 3.7 MMOL/L (ref 3.5–5.1)
RBC # BLD AUTO: 4.34 M/UL (ref 4.1–5.7)
SERVICE CMNT-IMP: NORMAL
SODIUM SERPL-SCNC: 142 MMOL/L (ref 136–145)
WBC # BLD AUTO: 6 K/UL (ref 4.1–11.1)

## 2024-06-24 PROCEDURE — 82962 GLUCOSE BLOOD TEST: CPT

## 2024-06-24 PROCEDURE — 97530 THERAPEUTIC ACTIVITIES: CPT

## 2024-06-24 PROCEDURE — 6360000002 HC RX W HCPCS: Performed by: STUDENT IN AN ORGANIZED HEALTH CARE EDUCATION/TRAINING PROGRAM

## 2024-06-24 PROCEDURE — 97112 NEUROMUSCULAR REEDUCATION: CPT

## 2024-06-24 PROCEDURE — 1100000003 HC PRIVATE W/ TELEMETRY

## 2024-06-24 PROCEDURE — 97535 SELF CARE MNGMENT TRAINING: CPT

## 2024-06-24 PROCEDURE — 36415 COLL VENOUS BLD VENIPUNCTURE: CPT

## 2024-06-24 PROCEDURE — 92526 ORAL FUNCTION THERAPY: CPT

## 2024-06-24 PROCEDURE — 80048 BASIC METABOLIC PNL TOTAL CA: CPT

## 2024-06-24 PROCEDURE — 94640 AIRWAY INHALATION TREATMENT: CPT

## 2024-06-24 PROCEDURE — 6370000000 HC RX 637 (ALT 250 FOR IP): Performed by: STUDENT IN AN ORGANIZED HEALTH CARE EDUCATION/TRAINING PROGRAM

## 2024-06-24 PROCEDURE — 97166 OT EVAL MOD COMPLEX 45 MIN: CPT

## 2024-06-24 PROCEDURE — 85025 COMPLETE CBC W/AUTO DIFF WBC: CPT

## 2024-06-24 PROCEDURE — 97162 PT EVAL MOD COMPLEX 30 MIN: CPT

## 2024-06-24 PROCEDURE — 2580000003 HC RX 258: Performed by: STUDENT IN AN ORGANIZED HEALTH CARE EDUCATION/TRAINING PROGRAM

## 2024-06-24 PROCEDURE — 94761 N-INVAS EAR/PLS OXIMETRY MLT: CPT

## 2024-06-24 RX ADMIN — ARFORMOTEROL TARTRATE: 15 SOLUTION RESPIRATORY (INHALATION) at 09:38

## 2024-06-24 RX ADMIN — CARBIDOPA AND LEVODOPA 1 TABLET: 25; 100 TABLET ORAL at 21:27

## 2024-06-24 RX ADMIN — ASPIRIN 81 MG: 81 TABLET, COATED ORAL at 09:55

## 2024-06-24 RX ADMIN — METOPROLOL TARTRATE 12.5 MG: 25 TABLET, FILM COATED ORAL at 09:55

## 2024-06-24 RX ADMIN — SODIUM CHLORIDE, PRESERVATIVE FREE 10 ML: 5 INJECTION INTRAVENOUS at 21:29

## 2024-06-24 RX ADMIN — POLYMYXIN B SULFATE AND TRIMETHOPRIM 1 DROP: 10000; 1 SOLUTION OPHTHALMIC at 00:31

## 2024-06-24 RX ADMIN — ENOXAPARIN SODIUM 90 MG: 100 INJECTION SUBCUTANEOUS at 21:28

## 2024-06-24 RX ADMIN — POLYMYXIN B SULFATE AND TRIMETHOPRIM 1 DROP: 10000; 1 SOLUTION OPHTHALMIC at 13:52

## 2024-06-24 RX ADMIN — POLYMYXIN B SULFATE AND TRIMETHOPRIM 1 DROP: 10000; 1 SOLUTION OPHTHALMIC at 22:15

## 2024-06-24 RX ADMIN — POLYMYXIN B SULFATE AND TRIMETHOPRIM 1 DROP: 10000; 1 SOLUTION OPHTHALMIC at 17:58

## 2024-06-24 RX ADMIN — CARBIDOPA AND LEVODOPA 1 TABLET: 25; 100 TABLET ORAL at 13:52

## 2024-06-24 RX ADMIN — SODIUM CHLORIDE, PRESERVATIVE FREE 10 ML: 5 INJECTION INTRAVENOUS at 09:56

## 2024-06-24 RX ADMIN — ARFORMOTEROL TARTRATE: 15 SOLUTION RESPIRATORY (INHALATION) at 23:11

## 2024-06-24 RX ADMIN — POLYMYXIN B SULFATE AND TRIMETHOPRIM 1 DROP: 10000; 1 SOLUTION OPHTHALMIC at 05:14

## 2024-06-24 RX ADMIN — METOPROLOL TARTRATE 12.5 MG: 25 TABLET, FILM COATED ORAL at 21:27

## 2024-06-24 RX ADMIN — PIPERACILLIN AND TAZOBACTAM 3375 MG: 3; .375 INJECTION, POWDER, LYOPHILIZED, FOR SOLUTION INTRAVENOUS at 22:14

## 2024-06-24 RX ADMIN — ATORVASTATIN CALCIUM 40 MG: 40 TABLET, FILM COATED ORAL at 09:55

## 2024-06-24 RX ADMIN — SODIUM CHLORIDE: 9 INJECTION, SOLUTION INTRAVENOUS at 21:42

## 2024-06-24 RX ADMIN — PIPERACILLIN AND TAZOBACTAM 3375 MG: 3; .375 INJECTION, POWDER, LYOPHILIZED, FOR SOLUTION INTRAVENOUS at 13:55

## 2024-06-24 RX ADMIN — PIPERACILLIN AND TAZOBACTAM 3375 MG: 3; .375 INJECTION, POWDER, LYOPHILIZED, FOR SOLUTION INTRAVENOUS at 05:13

## 2024-06-24 RX ADMIN — ENOXAPARIN SODIUM 90 MG: 100 INJECTION SUBCUTANEOUS at 09:55

## 2024-06-24 RX ADMIN — FINASTERIDE 5 MG: 5 TABLET, FILM COATED ORAL at 09:55

## 2024-06-24 RX ADMIN — CARBIDOPA AND LEVODOPA 1 TABLET: 25; 100 TABLET ORAL at 09:56

## 2024-06-24 NOTE — PROGRESS NOTES
Hospitalist Progress Note    NAME:   Tony Madsen   : 1950   MRN: [id number]     Date/Time: 2024 2:26 PM  Patient PCP: No, Pcp    Estimated discharge date:  Barriers:Placement     Assessment / Plan:    Severe sepsis likely from aspiration pneumonia   Elevated lactate 3.19--0.86   Generalized weakness may be due to above      CXR -No acute process   UA unremarkable   CTA chest :Moderate left and mild right lower lobe pneumonia. Debris in the trachea.Aspiration pneumonia is likely.  S/p septic bolus  S/p FESS :Pharyngeal swallow was functional for age with occasional spillage to the pyriforms prior to swallow initiation but otherwise adequate white out phase of the swallow.   SLP recommended :Diet: Easy to chew/ thin liquids      Plan:  Remained afebrile   Leucocytosis resolved   Patient more alert and able to answer   Completed Azithromycin   Continue Zosyn  for total 7 days, oral antibiotics on discharge   Blood culture : No growth so far X3 day   PT/OT recommended SNF   CM working on placement      Parkinson's Dementia   H/o stroke 2024 with residual deficit on right side   -Oral medications resumed  -Patient is on Eliquis. Wife is unaware about medications.  -Bilateral LE edema : negative for  DVT            Medical Decision Making:   I personally reviewed labs: yes   I personally reviewed imaging:yes   I personally reviewed EKG  Toxic drug monitoring: Zosyn   Discussed case with: Patient, RN     Code Status: DNR  DVT Prophylaxis:   Baseline: oriented to himself and person, walks with walker and roller aid     Subjective:     Chief Complaint / Reason for Physician Visit  \"Alert and states feeling better  \".  Discussed with RN events overnight.       Objective:     VITALS:   Last 24hrs VS reviewed since prior progress note. Most recent are:  Patient Vitals for the past 24 hrs:   BP Temp Temp src Pulse Resp SpO2   24 0940 -- -- -- 76 18 94 %   24 0907 (!) 133/94 -- -- 91 -- --

## 2024-06-24 NOTE — PROGRESS NOTES
End of Shift Note    Bedside shift change report given to  REGIS Lara  (oncoming nurse) by REGIS Pope.        Shift worked:  Days   Shift summary and any significant changes:     Awaiting placement       Concerns for physician to address:     Zone phone for oncoming shift:        Patient Information  Tony Madsen  73 y.o.  6/21/2024  7:25 AM by Hailey Espinal MD. Tony Madsen was admitted from [facility]    Problem List  Patient Active Problem List    Diagnosis Date Noted    Acute pneumonia 06/21/2024     No past medical history on file.    Core Measures:  CVA: yes  CHF: no  PNA: no    Activity:  Level of Assistance: Dependent, patient does less than 25%  Number times ambulated in hallways past shift: 0  Number of times OOB to chair past shift: 1    Cardiac:   Cardiac Monitoring: No    Access:   Current line(s): PIV    Respiratory:   O2 Device: None (Room air)    GI:  Last BM (including prior to admit): 06/23/24  Current diet:  ADULT DIET; Easy to Chew  Tolerating current diet: Yes    Pain Management:   Patient states pain is manageable on current regimen: N/A    Skin:  Craig Scale Score: 15  Interventions: dual skin   Pressure injury: no    Patient Safety:  Fall Score: Ceballos Total Score: 60  Interventions: stay with me program  Self-release roll belt: No  Dexterity to release roll belt: N/A   (must document dexterity  here by stating Yes or No here, otherwise this is a restraint and must follow restraint documentation policy.)    DVT prophylaxis:  DVT prophylaxis: meds    Active Consults:  IP CONSULT TO PHARMACY    Length of Stay:  Expected LOS: 5  Actual LOS: 3    Toshia Hurtado RN

## 2024-06-24 NOTE — PLAN OF CARE
Problem: Discharge Planning  Goal: Discharge to home or other facility with appropriate resources  6/23/2024 2325 by Emily Jang LPN  Outcome: Progressing  6/23/2024 1059 by Indiana Lindo RN  Outcome: Progressing     Problem: Pain  Goal: Verbalizes/displays adequate comfort level or baseline comfort level  6/23/2024 2325 by Emily Jang LPN  Outcome: Progressing  6/23/2024 1059 by Indiana Lindo RN  Outcome: Progressing     Problem: Safety - Adult  Goal: Free from fall injury  6/23/2024 2325 by Emily Jang LPN  Outcome: Progressing  6/23/2024 1059 by Indiana Lindo RN  Outcome: Progressing     Problem: Skin/Tissue Integrity  Goal: Absence of new skin breakdown  Description: 1.  Monitor for areas of redness and/or skin breakdown  2.  Assess vascular access sites hourly  3.  Every 4-6 hours minimum:  Change oxygen saturation probe site  4.  Every 4-6 hours:  If on nasal continuous positive airway pressure, respiratory therapy assess nares and determine need for appliance change or resting period.  6/23/2024 2325 by Emily Jang LPN  Outcome: Progressing  6/23/2024 1059 by Indiana Lindo RN  Outcome: Progressing     Problem: Respiratory - Adult  Goal: Achieves optimal ventilation and oxygenation  6/23/2024 2325 by Emily Jang LPN  Outcome: Progressing  6/23/2024 2056 by Sarah Graff, RT  Outcome: Progressing  6/23/2024 1059 by Indiana Lindo RN  Outcome: Progressing     Problem: Cardiovascular - Adult  Goal: Maintains optimal cardiac output and hemodynamic stability  6/23/2024 2325 by Emily Jang LPN  Outcome: Progressing  6/23/2024 1059 by Indiana Lindo RN  Outcome: Progressing  Goal: Absence of cardiac dysrhythmias or at baseline  6/23/2024 2325 by Emily Jang LPN  Outcome: Progressing  6/23/2024 1059 by Indiana Lindo RN  Outcome: Progressing     Problem: Skin/Tissue Integrity - Adult  Goal: Skin integrity remains intact  6/23/2024 2325 by  Emily Jang LPN  Outcome: Progressing  6/23/2024 1059 by Indiana Lindo RN  Outcome: Progressing     Problem: Hematologic - Adult  Goal: Maintains hematologic stability  6/23/2024 2325 by Emily Jang LPN  Outcome: Progressing  6/23/2024 1059 by Indiana Lindo RN  Outcome: Progressing

## 2024-06-24 NOTE — PLAN OF CARE
Problem: Physical Therapy - Adult  Goal: By Discharge: Performs mobility at highest level of function for planned discharge setting.  See evaluation for individualized goals.  Description: FUNCTIONAL STATUS PRIOR TO ADMISSION: The patient was functional at the rollator/wheelchair level.    HOME SUPPORT PRIOR TO ADMISSION: The patient lived with partner, used rollator vs wheelchair at home.    Physical Therapy Goals  Initiated 6/24/2024  1.  Patient will move from supine to sit and sit to supine, scoot up and down, and roll side to side in bed with moderate assistance within 7 day(s).    2.  Patient will perform sit to stand with moderate assistance within 7 day(s).  3.  Patient will transfer from bed to chair and chair to bed with maximal assistance using the least restrictive device within 7 day(s).  4.  Patient/caregiver will be modified independent with home exercise program within 7 day(s).   Outcome: Progressing     PHYSICAL THERAPY EVALUATION    Patient: Tony Madsen (73 y.o. male)  Date: 6/24/2024  Primary Diagnosis: Community acquired pneumonia, unspecified laterality [J18.9]  Acute pneumonia [J18.9]       Precautions: Restrictions/Precautions: Fall Risk, Bed Alarm                      ASSESSMENT :   Pt seen for PT evaluation following admission after silpping off toilet and LE weakness, found with afib/RVR and sepsis d/t aspiration pneumonia. Pt with hx dementia, oriented to self, hospital (when given 2 choices). Pt performed B knee flexion while semi-go in bed, reported R knee soreness at end range. Transferred to sit at EOB with Max A x2, demos posterior trunk lean in sitting despite cues for anterior functional reach and manual facilitation, minimal improvement with increased time. Sit <> stand attempted from EOB with BHHA from therapists and Damaris Steady, Max A x2 both trials but unable to stand d/t decreased B knee flexion, posterior trunk lean. Sit > supine performed with Max A x2. Rolling  to Sit: Maximum assistance;Assist X2  Sit to Supine: Maximum assistance;Assist X2  Scooting: Maximum assistance  Transfers:     Transfer Training  Transfer Training: Yes  Sit to Stand: Maximum assistance;Assist X2 (attempted 2x with RW and 1x with oniel guzman, pt unable to come to full stand with significant posterior leaning noted)  Stand to Sit: Maximum assistance;Assist X2  Balance:               Balance  Sitting: Impaired  Sitting - Static: Poor (constant support);Fair (occasional) (poor progressing to fair; maintained posterior leaning, but without assist)  Sitting - Dynamic: Fair (occasional) (posterior leaning noted with max verbal cues to facilitate anterior trunk flexion/upright positioning)  Standing: Impaired (unable to come to full standing)  Standing - Static: Poor  Ambulation/Gait Training:                                                                                                                                                                                                                                                                                      [facility]      Basic Mobility Inpatient Short Form (6-Clicks) Version 2  How much HELP from another person do you currently need... (If the patient hasn't done an activity recently, how much help from another person do you think they would need if they tried?) Total A Lot A Little None   1.  Turning from your back to your side while in a flat bed without using bedrails? [x]  1 []  2 []  3  []  4   2.  Moving from lying on your back to sitting on the side of a flat bed without using bedrails? [x]  1 []  2 []  3  []  4   3.  Moving to and from a bed to a chair (including a wheelchair)? [x]  1 []  2 []  3  []  4   4. Standing up from a chair using your arms (e.g. wheelchair or bedside chair)? [x]  1 []  2 []  3  []  4   5.  Walking in hospital room? [x]  1 []  2 []  3  []  4   6.  Climbing 3-5 steps with a railing? [x]  1 []  2 []

## 2024-06-24 NOTE — PLAN OF CARE
Problem: Occupational Therapy - Adult  Goal: By Discharge: Performs self-care activities at highest level of function for planned discharge setting.  See evaluation for individualized goals.  Description: FUNCTIONAL STATUS PRIOR TO ADMISSION:  Lives with partner/caregiver, Magalis. Pt states he required assistance for ADLs, primarily utilized w/c, but ambulated \"a little bit\" with walker.  Receives Help From: Family, ADL Assistance: Needs assistance,  ,  ,  ,  , Toileting: Needs assistance, Homemaking Assistance: Needs assistance, Ambulation Assistance: Needs assistance (Ambulatory with rollator and stand-by support of partner to prevent falls), Transfer Assistance: Needs assistance, Active : No     HOME SUPPORT: Patient lived with partner/caregiver and required assistance for all ADLs and IADLs.    Occupational Therapy Goals:  Initiated 6/24/2024  1.  Patient will perform self-feeding with Set-up within 7 day(s).  2.  Patient will perform grooming with Stand by Assist within 7 day(s).  3.  Patient will perform upper body dressing with Minimal Assist within 7 day(s).  4.  Patient will perform toilet transfers with Moderate Assist  within 7 day(s).  5.  Patient will perform all aspects of toileting with Moderate Assist within 7 day(s).  6.  Patient will perform supine<>sit with Moderate Assist within 7 day(s).        Outcome: Progressing   OCCUPATIONAL THERAPY EVALUATION    Patient: Tony Madsen (73 y.o. male)  Date: 6/24/2024  Primary Diagnosis: Community acquired pneumonia, unspecified laterality [J18.9]  Acute pneumonia [J18.9]         Precautions:                    ASSESSMENT :  The patient is limited by decreased functional mobility, independence in ADLs, ROM, strength, activity tolerance, endurance, safety awareness, cognition, command following, attention/concentration, coordination, balance, posture, fine-motor control.    Based on the impairments listed above pt is presenting below his baseline,  moderate modifications of tasks or assist (eg. physical or verbal) with assist is necessary to enable pt to complete eval   Based on the above components, the patient evaluation is determined to be of the following complexity level: Medium

## 2024-06-24 NOTE — CARE COORDINATION
Transition of Care Plan:    RUR: 11% (low RUR)  Prior Level of Functioning: Needing some assistance  Disposition: SNF   If SNF or IPR: Date FOC offered: 06/24  Date FOC received: 06/24  Accepting facility: Referral pending   Date authorization started with reference number: Will need [facility] auth   Date authorization received and expires: Pending auth start   Follow up appointments: PCP/Specialists as indicated  DME needed: None at this time  Transportation at discharge: Partner's son may transport  IM/IMM Medicare/ letter given: 2nd needed prior to d/c  Is patient a [place] and connected with VA? NA   If yes, was [place] transfer form completed and VA notified? N/a  Caregiver Contact: Magalis Cotter, domestic partner, [phone]   Discharge Caregiver contacted prior to discharge? CM to contact  Care Conference needed? Not at this time  Barriers to discharge: Clinical improvement,placement, auth     0854 - Assumed transitions of care planning from  RICARDO Bekrowitz. Chart review complete. Current plan to d/c home with HERNANDEZ orders for [facility]. CM noted PT/OT still to see patient, will follow for any recommendations.     1529 - PT/OT rec SNF, CM discussed with pt's partner as pt is not alert and oriented x4. Partner is agreeable, reports pt has been to [facility] before. Partner believes pt's FOC may be limited d/t to managed plan requirements. Partner reports [facility] is the only one in network. CM placed referral to [facility], but provided FOC list at bedside for partner to review for additional options if there are no managed requirements.     JYOTI Sanders  Care Management  [facility]  [phone]

## 2024-06-24 NOTE — PROGRESS NOTES
End of Shift Note    Bedside shift change report given to REGIS Pope (oncoming nurse) by Emily Jang LPN .        Shift worked:  Nights   Shift summary and any significant changes:     Awaiting PT/OT. Q2H turns completed;  Case Management following; Scheduled IV ABX given; Morning labs completed. No acute changes overnight.   Concerns for physician to address:  none   Zone phone for oncoming shift:   [phone]     Patient Information  Tony Madsen  73 y.o.  6/21/2024  7:25 AM by Hailey Espinal MD. Tony Madsen was admitted from [facility]    Problem List  Patient Active Problem List    Diagnosis Date Noted    Acute pneumonia 06/21/2024     No past medical history on file.    Core Measures:  CVA: N/A  CHF: N/A  PNA: yes    Activity:  Level of Assistance: Dependent, patient does less than 25%  Number times ambulated in hallways past shift: 0  Number of times OOB to chair past shift: 0    Cardiac:   Cardiac Monitoring: yes, tele box 139    Access:   Current line(s): PIV    Respiratory:   O2 Device: None (Room air)    GI:  Last BM (including prior to admit): 06/23/24  Current diet:  ADULT DIET; Easy to Chew  Tolerating current diet: Yes    Pain Management:   Patient states pain is manageable on current regimen: N/A    Skin:  Craig Scale Score: 14  Interventions: turn q2  Pressure injury: no    Patient Safety:  Fall Score: Ceballos Total Score: 60  Interventions: bed alarm  Self-release roll belt: No  Dexterity to release roll belt: N/A   (must document dexterity  here by stating Yes or No here, otherwise this is a restraint and must follow restraint documentation policy.)    DVT prophylaxis:  DVT prophylaxis: meds    Active Consults:  IP CONSULT TO PHARMACY    Length of Stay:  Expected LOS: 4  Actual LOS: 2    Emily Jang LPN

## 2024-06-24 NOTE — PLAN OF CARE
Speech LAnguage Pathology TREATMENT/DISCHARGE    Patient: Tony Madsen (73 y.o. male)  Date: 6/24/2024  Primary Diagnosis: Community acquired pneumonia, unspecified laterality [J18.9]  Acute pneumonia [J18.9]       Precautions:  Fall Risk, Bed Alarm                  ASSESSMENT :  Patient alert with lunch tray present at bedside. SLP assisted patient in gaining upright positioning and placing lunch tray in at bedside table. Patient fed himself bites of easy to chew foods, puree, and sips of thin via straw. Mastication mildly prolonged. Patient's family reports this food texture is what patient was previously eating while at rehab facility. Noted strong cough X1 during session (not immediately or around bite/sip). Per FEES report 6/22, patient with frequent coughing in the absence of aspiration. SLP reviewed swallow strategies and aspiration precautions. Patient appears at/near baseline function. No further acute SLP service warranted at this time. SLP will sign off.     Patient will be discharged from skilled speech-language pathology services at this time.     PLAN :  Recommendations and Planned Interventions:  Diet: Easy to chew and thin liquids  --Medications as tolerated   --Upright all PO intake   --Oral hygiene 2-3x/day          Acute SLP Services: No, patient will be discharged from acute skilled speech-language pathology at this time.    Discharge Recommendations: No, additional SLP treatment not indicated at discharge     SUBJECTIVE:   Patient's family at bedside and report patient was previously receiving foods similar to easy to chew texture while at rehab facility.     OBJECTIVE:   No past medical history on file.No past surgical history on file.  Prior Level of Function/Home Situation:   Social/Functional History  Lives With: Significant other  Type of Home: House  Home Layout: One level  Home Access: Stairs to enter with rails  Entrance Stairs - Number of Steps: 1  Bathroom Equipment: None  Home

## 2024-06-24 NOTE — PLAN OF CARE
Problem: Discharge Planning  Goal: Discharge to home or other facility with appropriate resources  6/24/2024 1109 by Toshia Hurtado RN  Outcome: Progressing  6/23/2024 2325 by Emily Jang LPN  Outcome: Progressing     Problem: Pain  Goal: Verbalizes/displays adequate comfort level or baseline comfort level  6/24/2024 1109 by Toshia Hurtado RN  Outcome: Progressing  6/23/2024 2325 by Emily Jang LPN  Outcome: Progressing     Problem: Safety - Adult  Goal: Free from fall injury  6/24/2024 1109 by Toshia Hurtado RN  Outcome: Progressing  6/23/2024 2325 by Emily Jang LPN  Outcome: Progressing     Problem: Skin/Tissue Integrity  Goal: Absence of new skin breakdown  Description: 1.  Monitor for areas of redness and/or skin breakdown  2.  Assess vascular access sites hourly  3.  Every 4-6 hours minimum:  Change oxygen saturation probe site  4.  Every 4-6 hours:  If on nasal continuous positive airway pressure, respiratory therapy assess nares and determine need for appliance change or resting period.  6/24/2024 1109 by Toshia Hurtado RN  Outcome: Progressing  6/23/2024 2325 by Emily Jang LPN  Outcome: Progressing     Problem: Respiratory - Adult  Goal: Achieves optimal ventilation and oxygenation  6/24/2024 1109 by Toshia Hurtado RN  Outcome: Progressing  6/24/2024 0948 by Heidy Freedman RCP  Outcome: Progressing  6/23/2024 2325 by Emily Jang LPN  Outcome: Progressing     Problem: Cardiovascular - Adult  Goal: Maintains optimal cardiac output and hemodynamic stability  6/24/2024 1109 by Toshia Hurtado RN  Outcome: Progressing  6/23/2024 2325 by Emily Jang LPN  Outcome: Progressing  Goal: Absence of cardiac dysrhythmias or at baseline  6/24/2024 1109 by Toshia Hurtado RN  Outcome: Progressing  6/23/2024 2325 by Emily Jang LPN  Outcome: Progressing     Problem: Skin/Tissue Integrity - Adult  Goal: Skin integrity remains intact  6/24/2024 1109 by Toshia Hurtado

## 2024-06-25 PROBLEM — J18.9 ACUTE PNEUMONIA: Status: RESOLVED | Noted: 2024-06-21 | Resolved: 2024-06-25

## 2024-06-25 LAB
ANION GAP SERPL CALC-SCNC: 6 MMOL/L (ref 5–15)
BASOPHILS # BLD: 0 K/UL (ref 0–0.1)
BASOPHILS NFR BLD: 0 % (ref 0–1)
BUN SERPL-MCNC: 11 MG/DL (ref 6–20)
BUN/CREAT SERPL: 10 (ref 12–20)
CALCIUM SERPL-MCNC: 8.6 MG/DL (ref 8.5–10.1)
CHLORIDE SERPL-SCNC: 110 MMOL/L (ref 97–108)
CO2 SERPL-SCNC: 25 MMOL/L (ref 21–32)
CREAT SERPL-MCNC: 1.11 MG/DL (ref 0.7–1.3)
DIFFERENTIAL METHOD BLD: ABNORMAL
EOSINOPHIL # BLD: 0.1 K/UL (ref 0–0.4)
EOSINOPHIL NFR BLD: 4 % (ref 0–7)
ERYTHROCYTE [DISTWIDTH] IN BLOOD BY AUTOMATED COUNT: 13.6 % (ref 11.5–14.5)
GLUCOSE BLD STRIP.AUTO-MCNC: 113 MG/DL (ref 65–117)
GLUCOSE BLD STRIP.AUTO-MCNC: 90 MG/DL (ref 65–117)
GLUCOSE BLD STRIP.AUTO-MCNC: 95 MG/DL (ref 65–117)
GLUCOSE SERPL-MCNC: 90 MG/DL (ref 65–100)
HCT VFR BLD AUTO: 37.8 % (ref 36.6–50.3)
HGB BLD-MCNC: 12.1 G/DL (ref 12.1–17)
IMM GRANULOCYTES # BLD AUTO: 0 K/UL (ref 0–0.04)
IMM GRANULOCYTES NFR BLD AUTO: 0 % (ref 0–0.5)
LYMPHOCYTES # BLD: 0.5 K/UL (ref 0.8–3.5)
LYMPHOCYTES NFR BLD: 15 % (ref 12–49)
MAGNESIUM SERPL-MCNC: 2 MG/DL (ref 1.6–2.4)
MCH RBC QN AUTO: 29.4 PG (ref 26–34)
MCHC RBC AUTO-ENTMCNC: 32 G/DL (ref 30–36.5)
MCV RBC AUTO: 92 FL (ref 80–99)
MONOCYTES # BLD: 0.2 K/UL (ref 0–1)
MONOCYTES NFR BLD: 8 % (ref 5–13)
NEUTS BAND NFR BLD MANUAL: 2 %
NEUTS SEG # BLD: 2.2 K/UL (ref 1.8–8)
NEUTS SEG NFR BLD: 71 % (ref 32–75)
NRBC # BLD: 0 K/UL (ref 0–0.01)
NRBC BLD-RTO: 0 PER 100 WBC
PLATELET # BLD AUTO: 181 K/UL (ref 150–400)
PMV BLD AUTO: 10.3 FL (ref 8.9–12.9)
POTASSIUM SERPL-SCNC: 3.4 MMOL/L (ref 3.5–5.1)
RBC # BLD AUTO: 4.11 M/UL (ref 4.1–5.7)
RBC MORPH BLD: ABNORMAL
SERVICE CMNT-IMP: NORMAL
SODIUM SERPL-SCNC: 141 MMOL/L (ref 136–145)
WBC # BLD AUTO: 3 K/UL (ref 4.1–11.1)

## 2024-06-25 PROCEDURE — 85025 COMPLETE CBC W/AUTO DIFF WBC: CPT

## 2024-06-25 PROCEDURE — 6360000002 HC RX W HCPCS: Performed by: STUDENT IN AN ORGANIZED HEALTH CARE EDUCATION/TRAINING PROGRAM

## 2024-06-25 PROCEDURE — 94761 N-INVAS EAR/PLS OXIMETRY MLT: CPT

## 2024-06-25 PROCEDURE — 80048 BASIC METABOLIC PNL TOTAL CA: CPT

## 2024-06-25 PROCEDURE — 94640 AIRWAY INHALATION TREATMENT: CPT

## 2024-06-25 PROCEDURE — 82962 GLUCOSE BLOOD TEST: CPT

## 2024-06-25 PROCEDURE — 6370000000 HC RX 637 (ALT 250 FOR IP): Performed by: STUDENT IN AN ORGANIZED HEALTH CARE EDUCATION/TRAINING PROGRAM

## 2024-06-25 PROCEDURE — 2580000003 HC RX 258: Performed by: STUDENT IN AN ORGANIZED HEALTH CARE EDUCATION/TRAINING PROGRAM

## 2024-06-25 PROCEDURE — 1100000003 HC PRIVATE W/ TELEMETRY

## 2024-06-25 PROCEDURE — 97530 THERAPEUTIC ACTIVITIES: CPT

## 2024-06-25 PROCEDURE — 83735 ASSAY OF MAGNESIUM: CPT

## 2024-06-25 PROCEDURE — 97112 NEUROMUSCULAR REEDUCATION: CPT

## 2024-06-25 PROCEDURE — 36415 COLL VENOUS BLD VENIPUNCTURE: CPT

## 2024-06-25 PROCEDURE — 97535 SELF CARE MNGMENT TRAINING: CPT

## 2024-06-25 RX ORDER — AMOXICILLIN AND CLAVULANATE POTASSIUM 875; 125 MG/1; MG/1
1 TABLET, FILM COATED ORAL 2 TIMES DAILY
Qty: 6 TABLET | Refills: 0 | Status: SHIPPED | OUTPATIENT
Start: 2024-06-25 | End: 2024-06-28

## 2024-06-25 RX ADMIN — SODIUM CHLORIDE, PRESERVATIVE FREE 10 ML: 5 INJECTION INTRAVENOUS at 08:38

## 2024-06-25 RX ADMIN — CARBIDOPA AND LEVODOPA 1 TABLET: 25; 100 TABLET ORAL at 21:23

## 2024-06-25 RX ADMIN — POTASSIUM BICARBONATE 40 MEQ: 782 TABLET, EFFERVESCENT ORAL at 08:36

## 2024-06-25 RX ADMIN — METOPROLOL TARTRATE 12.5 MG: 25 TABLET, FILM COATED ORAL at 08:36

## 2024-06-25 RX ADMIN — POLYMYXIN B SULFATE AND TRIMETHOPRIM 1 DROP: 10000; 1 SOLUTION OPHTHALMIC at 11:19

## 2024-06-25 RX ADMIN — CARBIDOPA AND LEVODOPA 1 TABLET: 25; 100 TABLET ORAL at 15:41

## 2024-06-25 RX ADMIN — PIPERACILLIN AND TAZOBACTAM 3375 MG: 3; .375 INJECTION, POWDER, LYOPHILIZED, FOR SOLUTION INTRAVENOUS at 05:57

## 2024-06-25 RX ADMIN — SODIUM CHLORIDE: 9 INJECTION, SOLUTION INTRAVENOUS at 21:23

## 2024-06-25 RX ADMIN — ARFORMOTEROL TARTRATE: 15 SOLUTION RESPIRATORY (INHALATION) at 20:48

## 2024-06-25 RX ADMIN — ASPIRIN 81 MG: 81 TABLET, COATED ORAL at 08:36

## 2024-06-25 RX ADMIN — POLYMYXIN B SULFATE AND TRIMETHOPRIM 1 DROP: 10000; 1 SOLUTION OPHTHALMIC at 06:24

## 2024-06-25 RX ADMIN — APIXABAN 5 MG: 5 TABLET, FILM COATED ORAL at 12:35

## 2024-06-25 RX ADMIN — POLYMYXIN B SULFATE AND TRIMETHOPRIM 1 DROP: 10000; 1 SOLUTION OPHTHALMIC at 17:03

## 2024-06-25 RX ADMIN — ARFORMOTEROL TARTRATE: 15 SOLUTION RESPIRATORY (INHALATION) at 08:44

## 2024-06-25 RX ADMIN — ATORVASTATIN CALCIUM 40 MG: 40 TABLET, FILM COATED ORAL at 08:36

## 2024-06-25 RX ADMIN — ENOXAPARIN SODIUM 90 MG: 100 INJECTION SUBCUTANEOUS at 08:36

## 2024-06-25 RX ADMIN — PIPERACILLIN AND TAZOBACTAM 3375 MG: 3; .375 INJECTION, POWDER, LYOPHILIZED, FOR SOLUTION INTRAVENOUS at 21:23

## 2024-06-25 RX ADMIN — METOPROLOL TARTRATE 12.5 MG: 25 TABLET, FILM COATED ORAL at 21:20

## 2024-06-25 RX ADMIN — CARBIDOPA AND LEVODOPA 1 TABLET: 25; 100 TABLET ORAL at 08:36

## 2024-06-25 RX ADMIN — APIXABAN 5 MG: 5 TABLET, FILM COATED ORAL at 21:20

## 2024-06-25 RX ADMIN — FINASTERIDE 5 MG: 5 TABLET, FILM COATED ORAL at 08:36

## 2024-06-25 RX ADMIN — PIPERACILLIN AND TAZOBACTAM 3375 MG: 3; .375 INJECTION, POWDER, LYOPHILIZED, FOR SOLUTION INTRAVENOUS at 12:36

## 2024-06-25 RX ADMIN — POLYMYXIN B SULFATE AND TRIMETHOPRIM 1 DROP: 10000; 1 SOLUTION OPHTHALMIC at 21:25

## 2024-06-25 ASSESSMENT — PAIN SCALES - GENERAL: PAINLEVEL_OUTOF10: 0

## 2024-06-25 NOTE — PROGRESS NOTES
End of Shift Note     Bedside shift change report given to REGIS Lara  (oncoming nurse) by REGIS Sylvester.          Shift worked:  Days   Shift summary and any significant changes:      Awaiting placement, patient medically stable. Patient turned Q2.         Concerns for physician to address:  None   Zone phone for oncoming shift:   [phone]      Patient Information  Tony Madsen  73 y.o.  6/21/2024  7:25 AM by Hailey Espinal MD. Tony Madsen was admitted from [facility]     Problem List       Patient Active Problem List     Diagnosis Date Noted    Acute pneumonia 06/21/2024      Past Medical History   No past medical history on file.        Core Measures:  CVA: yes  CHF: no  PNA: no     Activity:  Level of Assistance: Dependent, patient does less than 25%  Number times ambulated in hallways past shift: 0  Number of times OOB to chair past shift: 1     Cardiac:   Cardiac Monitoring: No     Access:   Current line(s): PIV     Respiratory:   O2 Device: None (Room air)     GI:  Last BM (including prior to admit): 06/23/24  Current diet:  ADULT DIET; Easy to Chew  Tolerating current diet: Yes     Pain Management:   Patient states pain is manageable on current regimen: N/A     Skin:  Craig Scale Score: 15  Interventions: dual skin   Pressure injury: no    Patient Safety:  Fall Score: Ceballos Total Score: 60  Interventions: stay with me program  Self-release roll belt: No  Dexterity to release roll belt: N/A   (must document dexterity  here by stating Yes or No here, otherwise this is a restraint and must follow restraint documentation policy.)     DVT prophylaxis:  DVT prophylaxis: meds     Active Consults:  IP CONSULT TO PHARMACY     Length of Stay:  Expected LOS: 5  Actual LOS: 5

## 2024-06-25 NOTE — PLAN OF CARE
Problem: Physical Therapy - Adult  Goal: By Discharge: Performs mobility at highest level of function for planned discharge setting.  See evaluation for individualized goals.  Description: FUNCTIONAL STATUS PRIOR TO ADMISSION: The patient was functional at the rollator/wheelchair level.    HOME SUPPORT PRIOR TO ADMISSION: The patient lived with partner, used rollator vs wheelchair at home.    Physical Therapy Goals  Initiated 6/24/2024  1.  Patient will move from supine to sit and sit to supine, scoot up and down, and roll side to side in bed with moderate assistance within 7 day(s).    2.  Patient will perform sit to stand with moderate assistance within 7 day(s).  3.  Patient will transfer from bed to chair and chair to bed with maximal assistance using the least restrictive device within 7 day(s).  4.  Patient/caregiver will be modified independent with home exercise program within 7 day(s).   Outcome: Progressing     PHYSICAL THERAPY TREATMENT    Patient: Tony Madsen (73 y.o. male)  Date: 6/25/2024  Diagnosis: Community acquired pneumonia, unspecified laterality [J18.9]  Acute pneumonia [J18.9] Acute pneumonia      Precautions: Fall Risk, Bed Alarm                      ASSESSMENT:  Patient continues to benefit from skilled PT services and is progressing towards goals. Pt encountered semi-reclined in bed in NAD, agreeable to participate in therapy. Supine > sit performed with Min A x2, cueing for hand placement and sequencing, lifting assist at trunk and BLE. Anterior scooting performed with Min A. Static sitting maintained with S, dynamic sitting with CG-Min A. Sit <> stand from EOB attempted with RW and Max A x2, pt upright but with max posterior trunk lean. Damaris Stedy lift used for sit <> stand transfer, pt independently placing feet on foot plate, required Mod A x2 to pull to stand and transfer to chair. Pt with episode of urinary incontinence, transferred chair > bed with Damaris Stedy and mod A x2. Sit >

## 2024-06-25 NOTE — PROGRESS NOTES
Hospitalist Progress Note    NAME:   Tony Madsen   : 1950   MRN: [id number]     Date/Time: 2024 11:44 AM  Patient PCP: No, Pcp    Estimated discharge date:  Barriers:Placement     Assessment / Plan:    Severe sepsis likely from aspiration pneumonia   Elevated lactate 3.19--0.86   Generalized weakness may be due to above      CXR -No acute process   UA unremarkable   CTA chest :Moderate left and mild right lower lobe pneumonia. Debris in the trachea.Aspiration pneumonia is likely.  S/p septic bolus  S/p FESS :Pharyngeal swallow was functional for age with occasional spillage to the pyriforms prior to swallow initiation but otherwise adequate white out phase of the swallow.   SLP recommended :Diet: Easy to chew/ thin liquids      Plan:  Patient more alert and able to answer   Completed Azithromycin   Continue Zosyn  for total 7 days, oral antibiotics on discharge   Blood culture : No growth so far X3 day   PT/OT recommended SNF   CM working on placement   On : No active issues     Parkinson's Dementia   H/o stroke 2024 with residual deficit on right side   -Oral medications resumed  -Patient is on Eliquis.   -Bilateral LE edema : negative for  DVT            Medical Decision Making:   I personally reviewed labs: yes   I personally reviewed imaging:yes   I personally reviewed EKG  Toxic drug monitoring: Zosyn   Discussed case with: Patient, RN     Code Status: DNR  DVT Prophylaxis:   Baseline: oriented to himself and person, walks with walker and roller aid     Subjective:     Chief Complaint / Reason for Physician Visit  \"Alert and states feeling better  \".  Discussed with RN events overnight.       Objective:     VITALS:   Last 24hrs VS reviewed since prior progress note. Most recent are:  Patient Vitals for the past 24 hrs:   BP Temp Temp src Pulse Resp SpO2   24 0847 -- -- -- -- -- 95 %   24 0736 (!) 132/91 97.5 °F (36.4 °C) Axillary 78 20 94 %   24 2311 -- -- -- 72  16 92 %   06/24/24 2127 113/74 98.8 °F (37.1 °C) Oral 79 17 92 %         Intake/Output Summary (Last 24 hours) at 6/25/2024 1144  Last data filed at 6/25/2024 0620  Gross per 24 hour   Intake --   Output 1875 ml   Net -1875 ml          I had a face to face encounter and independently examined this patient on 6/25/2024, as outlined below:  PHYSICAL EXAM:  General: Alert, cooperative  EENT:  EOMI. Anicteric sclerae.  Resp:  Bilateral clear breath sounds   CV:  Regular  rhythm,  No edema  GI:  Soft, Non distended, Non tender.  +Bowel sounds  Neurologic:  Alert and oriented X 1, residual right sided weakness    Skin:  No rashes.  No jaundice    Reviewed most current lab test results and cultures  YES  Reviewed most current radiology test results   YES  Review and summation of old records today    NO  Reviewed patient's current orders and MAR    YES  PMH/SH reviewed - no change compared to H&P    Procedures: see electronic medical records for all procedures/Xrays and details which were not copied into this note but were reviewed prior to creation of Plan.      LABS:  I reviewed today's most current labs and imaging studies.  Pertinent labs include:  Recent Labs     06/23/24  0043 06/24/24  0038 06/25/24  0351   WBC 9.8 6.0 3.0*   HGB 12.3 12.8 12.1   HCT 39.2 40.1 37.8    174 181     Recent Labs     06/23/24  0043 06/24/24  0038 06/25/24  0351    142 141   K 3.8 3.7 3.4*   * 113* 110*   CO2 25 26 25   GLUCOSE 94 97 90   BUN 14 11 11   CREATININE 1.18 1.17 1.11   CALCIUM 9.0 9.1 8.6   MG  --   --  2.0       Signed: Hailey Espinal MD

## 2024-06-25 NOTE — PLAN OF CARE
with sitting and standing balance, though continues to present with poor standing balance when utilizing RW, with posterior leaning noted. Utilized oniel stedy for transfer for recliner with x2 assist. Pt requiring less assistance for standing with oniel stedy. Increased BUE strength and AROM noted, enabling improved coordination for AD management. Pt remains below baseline and will benefit from SNF.              PLAN :  Patient continues to benefit from skilled intervention to address the above impairments.  Continue treatment per established plan of care to address goals.    Recommend with staff: OOB with oniel stedy and x2 assist; commode for toileting with oniel stedy    Recommend next OT session: continue POC    Recommendation for discharge: (in order for the patient to meet his/her long term goals): Therapy up to 5 days/week in Skilled nursing facility    Other factors to consider for discharge: poor safety awareness, impaired cognition, high risk for falls, not safe to be alone, and concern for safely navigating or managing the home environment    IF patient discharges home will need the following DME: continuing to assess with progress       SUBJECTIVE:   Patient stated “I don't follow a team.”    OBJECTIVE DATA SUMMARY:   Cognitive/Behavioral Status:  Orientation  Overall Orientation Status: Impaired  Orientation Level: Oriented to person  Cognition  Overall Cognitive Status: Exceptions  Arousal/Alertness: Delayed responses to stimuli  Following Commands: Follows one step commands with increased time;Follows one step commands with repetition  Attention Span: Attends with cues to redirect  Safety Judgement: Decreased awareness of need for safety  Problem Solving: Assistance required to identify errors made;Assistance required to implement solutions;Decreased awareness of errors  Insights: Decreased awareness of deficits  Initiation: Requires cues for some  Sequencing: Requires cues for some    Functional  Mobility and Transfers for ADLs:  Bed Mobility:  Bed Mobility Training  Bed Mobility Training: Yes  Interventions: Verbal cues;Tactile cues;Safety awareness training;Manual cues  Supine to Sit: Minimum assistance;Assist X2  Sit to Supine: Maximum assistance;Assist X2  Scooting: Minimum assistance;Assist X2     Transfers:   Transfer Training  Transfer Training: Yes  Sit to Stand: Maximum assistance;Assist X2;Moderate assistance (Max A x2 with RW +max posterior lean. Mod A x2 with oniel stedy)  Stand to Sit: Moderate assistance;Maximum assistance;Assist X2  Bed to Chair: Moderate assistance;Assist X2 (+Oniel Stedy)           Balance:     Balance  Sitting - Static: Good (unsupported)  Sitting - Dynamic: Fair (occasional)  Standing: Impaired  Standing - Static: Constant support;Poor      ADL Intervention:                                                                                    Skin Care: Chlorhexidine wipes;Foam skin cleanser              Pain Rating:  No pain reported  Pain Intervention(s):         Activity Tolerance:   Fair   Please refer to the flowsheet for vital signs taken during this treatment.    After treatment:   Patient left in no apparent distress sitting up in chair, Call bell within reach, and Bed/ chair alarm activated    COMMUNICATION/EDUCATION:   The patient's plan of care was discussed with: physical therapist and registered nurse    Patient Education  Education Given To: Patient  Education Provided: Role of Therapy;Transfer Training;Equipment;Plan of Care;Fall Prevention Strategies;Mobility Training  Education Method: Verbal;Demonstration  Barriers to Learning: Cognition  Education Outcome: Continued education needed;Verbalized understanding    Thank you for this referral.  Natalia Tobar OT  Minutes: 24

## 2024-06-25 NOTE — CARE COORDINATION
Transition of Care Plan:     RUR: 11% (low RUR)  Prior Level of Functioning: Needing some assistance  Disposition: SNF   If SNF or IPR: Date FOC offered: 06/24  Date FOC received: 06/24  Accepting facility: [facility]   Date authorization started with reference number: Will need [facility] auth - auth started on 6/25  Date authorization received and expires: Received on 6/25 (approved)  Follow up appointments: Defer to SNF  DME needed: None at this time  Transportation at discharge: Partner's son may transport  IM/IMM Medicare/ letter given: 2nd needed prior to d/c  Is patient a [place] and connected with [facility]? NA              If yes, was  transfer form completed and VA notified? N/a  Caregiver Contact: Magalis Cotter, domestic partner, [phone]   Discharge Caregiver contacted prior to discharge? CM to contact  Care Conference needed? Not at this time  Barriers to discharge: Auth    Pt medically cleared for discharge. Pt [facility] by [facility]. STEPHANIE sent a message to Clotilde Shelton representative [phone], via [employer] to request that auth request be started for pt. Cat confirmed that auth will be started today, 6/25.    5:02 PM: CM received confirmation from Cat that pt's auth has been approved for SNF placement at [facility]. Bed available for pt tomorrow, 6/26.     Kelley Rendon LMSW,   [phone]

## 2024-06-25 NOTE — PROGRESS NOTES
End of Shift Note    Bedside shift change report given to  REGIS Sylvester  (oncoming nurse) by REGIS Pope.        Shift worked:  nights   Shift summary and any significant changes:     Awaiting placement, abx, no acute issues       Concerns for physician to address:  none   Zone phone for oncoming shift:        Patient Information  Tony Madsen  73 y.o.  6/21/2024  7:25 AM by Hailey Espinal MD. Tony Madsen was admitted from [facility]    Problem List  Patient Active Problem List    Diagnosis Date Noted    Acute pneumonia 06/21/2024     No past medical history on file.    Core Measures:  CVA: yes  CHF: no  PNA: no    Activity:  Level of Assistance: Dependent, patient does less than 25%  Number times ambulated in hallways past shift: 0  Number of times OOB to chair past shift: 1    Cardiac:   Cardiac Monitoring: No    Access:   Current line(s): PIV    Respiratory:   O2 Device: None (Room air)    GI:  Last BM (including prior to admit): 06/23/24  Current diet:  ADULT DIET; Easy to Chew  Tolerating current diet: Yes    Pain Management:   Patient states pain is manageable on current regimen: N/A    Skin:  Craig Scale Score: 14  Interventions: dual skin   Pressure injury: no    Patient Safety:  Fall Score: Ceballos Total Score: 50  Interventions: stay with me program  Self-release roll belt: No  Dexterity to release roll belt: N/A   (must document dexterity  here by stating Yes or No here, otherwise this is a restraint and must follow restraint documentation policy.)    DVT prophylaxis:  DVT prophylaxis: meds    Active Consults:  IP CONSULT TO PHARMACY    Length of Stay:  Expected LOS: 5  Actual LOS: 4    Destiny Becerril RN

## 2024-06-25 NOTE — PLAN OF CARE
Problem: Discharge Planning  Goal: Discharge to home or other facility with appropriate resources  Outcome: Progressing     Problem: Pain  Goal: Verbalizes/displays adequate comfort level or baseline comfort level  Outcome: Progressing     Problem: Safety - Adult  Goal: Free from fall injury  Outcome: Progressing     Problem: Skin/Tissue Integrity  Goal: Absence of new skin breakdown  Description: 1.  Monitor for areas of redness and/or skin breakdown  2.  Assess vascular access sites hourly  3.  Every 4-6 hours minimum:  Change oxygen saturation probe site  4.  Every 4-6 hours:  If on nasal continuous positive airway pressure, respiratory therapy assess nares and determine need for appliance change or resting period.  Outcome: Progressing     Problem: Respiratory - Adult  Goal: Achieves optimal ventilation and oxygenation  Outcome: Progressing  Flowsheets (Taken 6/24/2024 2030)  Achieves optimal ventilation and oxygenation: Assess for changes in respiratory status     Problem: Cardiovascular - Adult  Goal: Maintains optimal cardiac output and hemodynamic stability  Outcome: Progressing  Flowsheets (Taken 6/24/2024 2030)  Maintains optimal cardiac output and hemodynamic stability: Monitor blood pressure and heart rate  Goal: Absence of cardiac dysrhythmias or at baseline  Outcome: Progressing     Problem: Skin/Tissue Integrity - Adult  Goal: Skin integrity remains intact  Outcome: Progressing  Flowsheets  Taken 6/25/2024 0238  Skin Integrity Remains Intact: Monitor for areas of redness and/or skin breakdown  Taken 6/24/2024 2030  Skin Integrity Remains Intact: Monitor for areas of redness and/or skin breakdown     Problem: Hematologic - Adult  Goal: Maintains hematologic stability  Outcome: Progressing     Problem: Physical Therapy - Adult  Goal: By Discharge: Performs mobility at highest level of function for planned discharge setting.  See evaluation for individualized goals.  Description: FUNCTIONAL STATUS

## 2024-06-26 VITALS
HEART RATE: 80 BPM | BODY MASS INDEX: 30.14 KG/M2 | TEMPERATURE: 97.3 F | DIASTOLIC BLOOD PRESSURE: 74 MMHG | SYSTOLIC BLOOD PRESSURE: 137 MMHG | WEIGHT: 192.02 LBS | HEIGHT: 67 IN | RESPIRATION RATE: 20 BRPM | OXYGEN SATURATION: 97 %

## 2024-06-26 LAB
ANION GAP SERPL CALC-SCNC: 5 MMOL/L (ref 5–15)
BASOPHILS # BLD: 0 K/UL (ref 0–0.1)
BASOPHILS NFR BLD: 1 % (ref 0–1)
BUN SERPL-MCNC: 11 MG/DL (ref 6–20)
BUN/CREAT SERPL: 9 (ref 12–20)
CALCIUM SERPL-MCNC: 8.9 MG/DL (ref 8.5–10.1)
CHLORIDE SERPL-SCNC: 110 MMOL/L (ref 97–108)
CO2 SERPL-SCNC: 25 MMOL/L (ref 21–32)
CREAT SERPL-MCNC: 1.16 MG/DL (ref 0.7–1.3)
DIFFERENTIAL METHOD BLD: NORMAL
EOSINOPHIL # BLD: 0 K/UL (ref 0–0.4)
EOSINOPHIL NFR BLD: 1 % (ref 0–7)
ERYTHROCYTE [DISTWIDTH] IN BLOOD BY AUTOMATED COUNT: 13.7 % (ref 11.5–14.5)
GLUCOSE SERPL-MCNC: 91 MG/DL (ref 65–100)
HCT VFR BLD AUTO: 39.2 % (ref 36.6–50.3)
HGB BLD-MCNC: 12.5 G/DL (ref 12.1–17)
IMM GRANULOCYTES # BLD AUTO: 0 K/UL (ref 0–0.04)
IMM GRANULOCYTES NFR BLD AUTO: 0 % (ref 0–0.5)
LYMPHOCYTES # BLD: 0.9 K/UL (ref 0.8–3.5)
LYMPHOCYTES NFR BLD: 22 % (ref 12–49)
MCH RBC QN AUTO: 29.1 PG (ref 26–34)
MCHC RBC AUTO-ENTMCNC: 31.9 G/DL (ref 30–36.5)
MCV RBC AUTO: 91.2 FL (ref 80–99)
MONOCYTES # BLD: 0.6 K/UL (ref 0–1)
MONOCYTES NFR BLD: 13 % (ref 5–13)
NEUTS SEG # BLD: 2.8 K/UL (ref 1.8–8)
NEUTS SEG NFR BLD: 63 % (ref 32–75)
NRBC # BLD: 0 K/UL (ref 0–0.01)
NRBC BLD-RTO: 0 PER 100 WBC
PLATELET # BLD AUTO: 207 K/UL (ref 150–400)
PMV BLD AUTO: 10.2 FL (ref 8.9–12.9)
POTASSIUM SERPL-SCNC: 3.9 MMOL/L (ref 3.5–5.1)
RBC # BLD AUTO: 4.3 M/UL (ref 4.1–5.7)
RBC MORPH BLD: NORMAL
SODIUM SERPL-SCNC: 140 MMOL/L (ref 136–145)
WBC # BLD AUTO: 4.3 K/UL (ref 4.1–11.1)
WBC MORPH BLD: NORMAL

## 2024-06-26 PROCEDURE — 36415 COLL VENOUS BLD VENIPUNCTURE: CPT

## 2024-06-26 PROCEDURE — 6370000000 HC RX 637 (ALT 250 FOR IP): Performed by: STUDENT IN AN ORGANIZED HEALTH CARE EDUCATION/TRAINING PROGRAM

## 2024-06-26 PROCEDURE — 94761 N-INVAS EAR/PLS OXIMETRY MLT: CPT

## 2024-06-26 PROCEDURE — 85025 COMPLETE CBC W/AUTO DIFF WBC: CPT

## 2024-06-26 PROCEDURE — 94640 AIRWAY INHALATION TREATMENT: CPT

## 2024-06-26 PROCEDURE — 6360000002 HC RX W HCPCS: Performed by: STUDENT IN AN ORGANIZED HEALTH CARE EDUCATION/TRAINING PROGRAM

## 2024-06-26 PROCEDURE — 2580000003 HC RX 258: Performed by: STUDENT IN AN ORGANIZED HEALTH CARE EDUCATION/TRAINING PROGRAM

## 2024-06-26 PROCEDURE — 80048 BASIC METABOLIC PNL TOTAL CA: CPT

## 2024-06-26 RX ADMIN — METOPROLOL TARTRATE 12.5 MG: 25 TABLET, FILM COATED ORAL at 08:55

## 2024-06-26 RX ADMIN — ASPIRIN 81 MG: 81 TABLET, COATED ORAL at 08:55

## 2024-06-26 RX ADMIN — POLYMYXIN B SULFATE AND TRIMETHOPRIM 1 DROP: 10000; 1 SOLUTION OPHTHALMIC at 05:33

## 2024-06-26 RX ADMIN — ATORVASTATIN CALCIUM 40 MG: 40 TABLET, FILM COATED ORAL at 08:55

## 2024-06-26 RX ADMIN — APIXABAN 5 MG: 5 TABLET, FILM COATED ORAL at 08:55

## 2024-06-26 RX ADMIN — CARBIDOPA AND LEVODOPA 1 TABLET: 25; 100 TABLET ORAL at 08:55

## 2024-06-26 RX ADMIN — ARFORMOTEROL TARTRATE: 15 SOLUTION RESPIRATORY (INHALATION) at 08:22

## 2024-06-26 RX ADMIN — FINASTERIDE 5 MG: 5 TABLET, FILM COATED ORAL at 08:55

## 2024-06-26 RX ADMIN — PIPERACILLIN AND TAZOBACTAM 3375 MG: 3; .375 INJECTION, POWDER, LYOPHILIZED, FOR SOLUTION INTRAVENOUS at 05:33

## 2024-06-26 NOTE — CARE COORDINATION
Completed    []The screening denied because it was determined individual did not need/did not qualify for nursing facility level of care.  [] Out of state residents seeking direct admission to a VA nursing facility.  [] Individuals who are inpatients of an out of state hospital, or in state or out of state veterans/ hospital and seek direct admission to a VA nursing facility  [] Individuals who are pateints or residents of a state owned/operated facility that is licensed by Department of Behavioral Services ([facility]) and seek direct admission to VA nursing facility  [] A screening not required for enrollment in Medicaid Hospice services as set out in 12 VAC 30-  [] [facility] ([facility]) staff shall perform screenings of the [facility] clients.    Advanced Care Plan:  []Surrogate Decision Maker of Care  []POA  [x]Communicated Code Status and copy sent.    Other:            06/26/24 0854   Services At/After Discharge   Transition of Care Consult (CM Consult) SNF   Services At/After Discharge Skilled Nursing Facility (SNF)   [place] Resource Information Provided? No  (N/a)   Mode of Transport at Discharge Other (see comment)  (Family)   Confirm Follow Up Transport Family   Condition of Participation: Discharge Planning   The Plan for Transition of Care is related to the following treatment goals: SNF prior to returning home to continue working on strength and mobility goals   The Patient and/or Patient Representative was provided with a Choice of Provider? Patient Representative   Name of the Patient Representative who was provided with the Choice of Provider and agrees with the Discharge Plan?  Magalis Cotter   The Patient and/Or Patient Representative agree with the Discharge Plan? Yes   Freedom of Choice list was provided with basic dialogue that supports the patient's individualized plan of care/goals, treatment preferences, and shares the quality data associated with the providers?  Yes          Pilar Causey, MSW  Care Management  [facility]  [phone]

## 2024-06-26 NOTE — H&P
Two attempts made to call report-  answered both times. I called back and left number, they can call here at their convenience. Did inform patient was being transported at 1300.

## 2024-06-26 NOTE — PLAN OF CARE
Problem: Discharge Planning  Goal: Discharge to home or other facility with appropriate resources  Outcome: Progressing     Problem: Pain  Goal: Verbalizes/displays adequate comfort level or baseline comfort level  Outcome: Progressing     Problem: Safety - Adult  Goal: Free from fall injury  Outcome: Progressing     Problem: Skin/Tissue Integrity  Goal: Absence of new skin breakdown  Description: 1.  Monitor for areas of redness and/or skin breakdown  2.  Assess vascular access sites hourly  3.  Every 4-6 hours minimum:  Change oxygen saturation probe site  4.  Every 4-6 hours:  If on nasal continuous positive airway pressure, respiratory therapy assess nares and determine need for appliance change or resting period.  Outcome: Progressing     Problem: Respiratory - Adult  Goal: Achieves optimal ventilation and oxygenation  Outcome: Progressing  Flowsheets (Taken 6/25/2024 2048 by Denisse Joe RCP)  Achieves optimal ventilation and oxygenation:   Respiratory therapy support as indicated   Encourage broncho-pulmonary hygiene including cough, deep breathe, incentive spirometry     Problem: Cardiovascular - Adult  Goal: Maintains optimal cardiac output and hemodynamic stability  Outcome: Progressing  Goal: Absence of cardiac dysrhythmias or at baseline  Outcome: Progressing     Problem: Skin/Tissue Integrity - Adult  Goal: Skin integrity remains intact  Outcome: Progressing  Flowsheets  Taken 6/26/2024 0309  Skin Integrity Remains Intact: Monitor for areas of redness and/or skin breakdown  Taken 6/25/2024 2000  Skin Integrity Remains Intact: Monitor for areas of redness and/or skin breakdown     Problem: Hematologic - Adult  Goal: Maintains hematologic stability  Outcome: Progressing     Problem: Occupational Therapy - Adult  Goal: By Discharge: Performs self-care activities at highest level of function for planned discharge setting.  See evaluation for individualized goals.  Description: FUNCTIONAL STATUS PRIOR

## 2024-06-26 NOTE — DISCHARGE INSTRUCTIONS
Aspiration Pneumonia: Care Instructions  Your Care Instructions     Aspiration pneumonia is an inflammation of the lungs. It may occur after you breathe in foreign material, such as food, liquid, vomit, or mucus.  Aspiration may happen because of a health problem that makes it hard to swallow. These problems include stroke or seizure.  Pneumonia makes it hard to breathe.  Follow-up care is a key part of your treatment and safety. Be sure to make and go to all appointments, and call your doctor if you are having problems. It's also a good idea to know your test results and keep a list of the medicines you take.  How can you care for yourself at home?  To help with swallowing   You may need to do exercises to train your muscles to work together to help you swallow. You may also need to learn how to position your body or how to put food in your mouth to be able to swallow better.  You may need to change the foods you eat. Your doctor may tell you to eat certain foods and liquids to make swallowing easier.  You may need to change how you prepare foods. For example, you may need to add thickeners to some liquids, or puree certain foods in a .  To help with pneumonia   Take your antibiotics as directed. Do not stop taking them just because you feel better. You need to take the full course of antibiotics.  Take your medicines exactly as prescribed. For example, your doctor may have given you medicine that makes breathing easier. Call your doctor if you think you are having a problem with your medicine.  Get plenty of rest and sleep. You may feel weak and tired for a while, but your energy level will improve with time.  Take care of your cough so you can rest. A cough that brings up mucus from your lungs is common with pneumonia. It is one way your body gets rid of the infection. But if coughing keeps you from resting or causes severe fatigue and chest-wall pain, talk to your doctor. They may suggest that you

## 2024-06-26 NOTE — PROGRESS NOTES
End of Shift Note    Bedside shift change report given to  REGIS gonzalez  (oncoming nurse) by REGIS Pope.        Shift worked:  nights   Shift summary and any significant changes:     Awaiting placement to hernesto brady, no acute issues       Concerns for physician to address:  none   Zone phone for oncoming shift:        Patient Information  Tony Madsen  73 y.o.  6/21/2024  7:25 AM by Hailey Espinal MD. Tony Madsen was admitted from [facility]    Problem List  There are no problems to display for this patient.    No past medical history on file.    Core Measures:  CVA: yes  CHF: no  PNA: no    Activity:  Level of Assistance: Maximum assist, patient does 25-49%  Number times ambulated in hallways past shift: 0  Number of times OOB to chair past shift: 1    Cardiac:   Cardiac Monitoring: No    Access:   Current line(s): PIV    Respiratory:   O2 Device: None (Room air)    GI:  Last BM (including prior to admit): 06/26/24  Current diet:  ADULT DIET; Easy to Chew  Tolerating current diet: Yes    Pain Management:   Patient states pain is manageable on current regimen: N/A    Skin:  Craig Scale Score: 13  Interventions: dual skin   Pressure injury: no    Patient Safety:  Fall Score: Ceballos Total Score: 50  Interventions: stay with me program  Self-release roll belt: No  Dexterity to release roll belt: N/A   (must document dexterity  here by stating Yes or No here, otherwise this is a restraint and must follow restraint documentation policy.)    DVT prophylaxis:  DVT prophylaxis: meds    Active Consults:  IP CONSULT TO PHARMACY    Length of Stay:  Expected LOS: 5  Actual LOS: 5    Destiny Becerril RN

## 2024-06-26 NOTE — DISCHARGE SUMMARY
Discharge Summary    Name: Tony Madsen  [id number]  YOB: 1950 (Age: 73 y.o.)   Date of Admission: 6/21/2024  Date of Discharge: 6/26/2024  Attending Physician: Delfina Domingo MD    Discharge Diagnosis:   Severe sepsis likely from aspiration pneumonia   Elevated lactate 3.19--0.86   Generalized weakness may be due to above    Parkinson's Dementia   H/o stroke 1/2024 with residual deficit on right side   Consultations:  IP CONSULT TO PHARMACY      Brief Admission History/Reason for Admission Per Hailey Espinal MD:     Tony Madsen is a 73 y.o.  male with PMHx as mentioned below brought in by ambulance after he slipped off the toilet just prior to arrival.  As per ED note patient was complaining of some generalized weakness, responding to orientation questions.  Patient is a poor historian.  History obtained from wife:  As per her he had cough, runny nose since few days.  Normally he walks with roller aid and wheel chair. He was not moving his leg today.  He appeared to be weak.  He had been admitted on January for stroke.  He has been following up with physical therapy since then.  He has residual right-sided weakness due to stroke.  As per wife he has been evaluated by SLP in the past for dysphagia-advised for soft food.  Patient's wife-is unaware of most of his past medical history.  She advised her to review the medical records.  At this time unable to access any medical records.  We were asked to admit for work up and evaluation of the above problems.      Brief Hospital Course by Main Problems:     Severe sepsis likely from aspiration pneumonia   Elevated lactate 3.19--0.86   Generalized weakness may be due to above      CXR -No acute process   UA unremarkable   CTA chest :Moderate left and mild right lower lobe pneumonia. Debris in the trachea.Aspiration pneumonia is likely.  S/p septic bolus  S/p FESS :Pharyngeal swallow was functional for age with  occasional spillage to the pyriforms prior to swallow initiation but otherwise adequate white out phase of the swallow.   SLP recommended :Diet: Easy to chew/ thin liquids      Plan:  Patient more alert and able to answer   Completed Azithromycin   Continue Zosyn  for total 7 days, oral antibiotics on discharge   Blood culture : No growth so far X3 day   PT/OT recommended SNF   CM working on placement   On 6/25: No active issues     Parkinson's Dementia   H/o stroke 1/2024 with residual deficit on right side   -Oral medications resumed  -Patient is on Eliquis.   -Bilateral LE edema : negative for  DVT   Discharge Exam:  Patient seen and examined by me on discharge day.  Pertinent Findings:  Patient Vitals for the past 24 hrs:   BP Temp Temp src Pulse Resp SpO2   06/26/24 0851 137/74 97.3 °F (36.3 °C) -- 80 20 97 %   06/26/24 0833 -- -- -- 70 16 97 %   06/26/24 0822 -- -- -- 73 16 98 %   06/25/24 2303 116/69 98.8 °F (37.1 °C) -- 75 -- 97 %   06/25/24 2048 -- -- -- 72 18 95 %   06/25/24 2000 126/76 97.5 °F (36.4 °C) Axillary 77 18 95 %       Gen:    Not in distress  Chest: Clear lungs  CVS:   Regular rhythm.  No edema  Abd:  Soft, not distended, not tender  Neuro: awake, moving all exts    Discharge/Recent Laboratory Results:  Recent Labs     06/25/24  0351 06/26/24  0531    140   K 3.4* 3.9   * 110*   CO2 25 25   BUN 11 11   CREATININE 1.11 1.16   GLUCOSE 90 91   CALCIUM 8.6 8.9   MG 2.0  --      Recent Labs     06/26/24  0531   HGB 12.5   HCT 39.2   WBC 4.3          Discharge Medications:     Medication List        START taking these medications      amoxicillin-clavulanate 875-125 MG per tablet  Commonly known as: AUGMENTIN  Take 1 tablet by mouth 2 times daily for 3 days            CONTINUE taking these medications      aspirin 81 MG EC tablet     atorvastatin 40 MG tablet  Commonly known as: LIPITOR     carbidopa-levodopa  MG per tablet  Commonly known as: SINEMET     cetirizine 10 MG

## 2024-06-27 LAB
BACTERIA SPEC CULT: NORMAL
BACTERIA SPEC CULT: NORMAL
SERVICE CMNT-IMP: NORMAL
SERVICE CMNT-IMP: NORMAL

## 2024-08-23 ENCOUNTER — HOSPITAL ENCOUNTER (EMERGENCY)
Facility: HOSPITAL | Age: 74
Discharge: HOME | End: 2024-08-23
Attending: EMERGENCY MEDICINE
Payer: MEDICARE

## 2024-08-23 VITALS
RESPIRATION RATE: 16 BRPM | OXYGEN SATURATION: 90 % | SYSTOLIC BLOOD PRESSURE: 120 MMHG | HEART RATE: 69 BPM | BODY MASS INDEX: 28.06 KG/M2 | WEIGHT: 178.79 LBS | TEMPERATURE: 97.6 F | DIASTOLIC BLOOD PRESSURE: 73 MMHG | HEIGHT: 67 IN

## 2024-08-23 DIAGNOSIS — R19.7 DIARRHEA, UNSPECIFIED TYPE: Primary | ICD-10-CM

## 2024-08-23 LAB
ALBUMIN SERPL-MCNC: 2.8 G/DL (ref 3.5–5)
ALBUMIN/GLOB SERPL: 0.7 (ref 1.1–2.2)
ALP SERPL-CCNC: 86 U/L (ref 45–117)
ALT SERPL-CCNC: 15 U/L (ref 12–78)
ANION GAP SERPL CALC-SCNC: 6 MMOL/L (ref 5–15)
AST SERPL-CCNC: 14 U/L (ref 15–37)
BASOPHILS # BLD: 0 K/UL (ref 0–0.1)
BASOPHILS NFR BLD: 0 % (ref 0–1)
BILIRUB SERPL-MCNC: 1.4 MG/DL (ref 0.2–1)
BUN SERPL-MCNC: 8 MG/DL (ref 6–20)
BUN/CREAT SERPL: 8 (ref 12–20)
CALCIUM SERPL-MCNC: 9 MG/DL (ref 8.5–10.1)
CHLORIDE SERPL-SCNC: 108 MMOL/L (ref 97–108)
CO2 SERPL-SCNC: 28 MMOL/L (ref 21–32)
CREAT SERPL-MCNC: 1.04 MG/DL (ref 0.7–1.3)
DIFFERENTIAL METHOD BLD: ABNORMAL
EOSINOPHIL # BLD: 0.2 K/UL (ref 0–0.4)
EOSINOPHIL NFR BLD: 3 % (ref 0–7)
ERYTHROCYTE [DISTWIDTH] IN BLOOD BY AUTOMATED COUNT: 14.1 % (ref 11.5–14.5)
GLOBULIN SER CALC-MCNC: 3.8 G/DL (ref 2–4)
GLUCOSE SERPL-MCNC: 78 MG/DL (ref 65–100)
HCT VFR BLD AUTO: 41.3 % (ref 36.6–50.3)
HGB BLD-MCNC: 13.1 G/DL (ref 12.1–17)
IMM GRANULOCYTES # BLD AUTO: 0 K/UL (ref 0–0.04)
IMM GRANULOCYTES NFR BLD AUTO: 0 % (ref 0–0.5)
LYMPHOCYTES # BLD: 1.8 K/UL (ref 0.8–3.5)
LYMPHOCYTES NFR BLD: 25 % (ref 12–49)
MAGNESIUM SERPL-MCNC: 1.8 MG/DL (ref 1.6–2.4)
MCH RBC QN AUTO: 28.9 PG (ref 26–34)
MCHC RBC AUTO-ENTMCNC: 31.7 G/DL (ref 30–36.5)
MCV RBC AUTO: 91.2 FL (ref 80–99)
MONOCYTES # BLD: 1 K/UL (ref 0–1)
MONOCYTES NFR BLD: 14 % (ref 5–13)
NEUTS SEG # BLD: 4.3 K/UL (ref 1.8–8)
NEUTS SEG NFR BLD: 58 % (ref 32–75)
NRBC # BLD: 0 K/UL (ref 0–0.01)
NRBC BLD-RTO: 0 PER 100 WBC
PLATELET # BLD AUTO: 228 K/UL (ref 150–400)
PMV BLD AUTO: 9.8 FL (ref 8.9–12.9)
POTASSIUM SERPL-SCNC: 3.6 MMOL/L (ref 3.5–5.1)
PROT SERPL-MCNC: 6.6 G/DL (ref 6.4–8.2)
RBC # BLD AUTO: 4.53 M/UL (ref 4.1–5.7)
SODIUM SERPL-SCNC: 142 MMOL/L (ref 136–145)
WBC # BLD AUTO: 7.4 K/UL (ref 4.1–11.1)

## 2024-08-23 PROCEDURE — 80053 COMPREHEN METABOLIC PANEL: CPT

## 2024-08-23 PROCEDURE — 36415 COLL VENOUS BLD VENIPUNCTURE: CPT

## 2024-08-23 PROCEDURE — 99283 EMERGENCY DEPT VISIT LOW MDM: CPT

## 2024-08-23 PROCEDURE — 83735 ASSAY OF MAGNESIUM: CPT

## 2024-08-23 PROCEDURE — 85025 COMPLETE CBC W/AUTO DIFF WBC: CPT

## 2024-08-23 RX ORDER — LOPERAMIDE HYDROCHLORIDE 2 MG/1
2 CAPSULE ORAL 4 TIMES DAILY PRN
Qty: 10 CAPSULE | Refills: 0 | Status: SHIPPED | OUTPATIENT
Start: 2024-08-23 | End: 2024-08-23

## 2024-08-23 RX ORDER — LOPERAMIDE HYDROCHLORIDE 2 MG/1
2 CAPSULE ORAL 4 TIMES DAILY PRN
Qty: 10 CAPSULE | Refills: 0 | Status: SHIPPED | OUTPATIENT
Start: 2024-08-23 | End: 2024-09-02

## 2024-08-23 NOTE — DISCHARGE INSTRUCTIONS
You were evaluated in the emergency department for diarrhea.  Your examination was reassuring as was your work-up including lab work.  It will be important for you to follow-up with your primary care physician in 3-7 days.  You can use antidiarrheal such as Imodium to help with diarrhea if this persists.  If you develop worsening symptoms such as nausea, vomiting, abdominal pain, or fevers, please return to the emergency department immediately.

## 2024-08-23 NOTE — ED TRIAGE NOTES
Pt presents to ED via EMS from home and is moved off the stretcher. EMS reports pt had a stroke in January 2024 and discharged from rehab in July 2024; since then, pt has had diarrhea. Pt friend who lives with him called as pt has had an increase in diarrhea today with 3 episodes. EMS noted that pt friend noticed blood in his stool last night and was unable to describe color. Pt friend called PCP and they advised pt to come to ED. Pt recently taken off fluid pill because it made the diarrhea worse. H/o HTN and stroke with right sided deficits. Pt takes Eliquis.    EMS reports pt baseline A&OX4 and ambulates with a wheelchair/rollator. Pt A&XO2 att.

## 2024-08-23 NOTE — ED PROVIDER NOTES
[facility] EMERGENCY DEPT  EMERGENCY DEPARTMENT ENCOUNTER       Pt Name: Cale Mohan  MRN: [id number]  Birthdate 1950  Date of evaluation: 8/23/2024  Provider: John Miner MD   PCP: Hilda, Pcp  Note Started: 4:03 PM 8/23/24     CHIEF COMPLAINT       Chief Complaint   Patient presents with    Diarrhea     Pt presents to ED via EMS from home & is moved off the stretcher. EMS reports pt had a stroke in Jan 2024 & discharged from rehab in July 2024; since then, pt has had diarrhea. Pt friend who lives with him called as pt has had an increase in diarrhea with 3 episodes today. EMS noted that pt friend noticed blood in his stool last night; unable to describe color. Pt friend called PCP & they advised pt to come to ED. H/o HTN and stroke with right sided deficits. Pt takes Eliquis.        HISTORY OF PRESENT ILLNESS: 1 or more elements      History From: From EMS, History limited by: Aphasia     Cale Mohan is a 73 y.o. male with recent CVA in January 2024 resulting in baseline right-sided deficits and aphasia who presents with chief complaint of diarrhea.  Symptoms have been present for the past few weeks.  Patient reportedly had an increase in diarrhea today with about 3 episodes.  Has not been using any antidiarrheals.  Denies any abdominal pain, fevers.  Has been tolerating normal p.o. intake.  Remainder of history limited given patient's history of aphasia.     Nursing Notes were all reviewed and agreed with or any disagreements were addressed in the HPI.     REVIEW OF SYSTEMS        Positives and Pertinent negatives as per HPI.    PAST HISTORY     Past Medical History:  Past Medical History:   Diagnosis Date    Cerebral artery occlusion with cerebral infarction (HCC)     Hypertension        Past Surgical History:  Past Surgical History:   Procedure Laterality Date    EP DEVICE PROCEDURE N/A 1/15/2024    Loop recorder insert performed by Sherly Latham MD at [facility] CARDIAC CATH LAB    IR MECHANICAL ART THROMBECTOMY

## 2025-01-16 ENCOUNTER — OFFICE VISIT (OUTPATIENT)
Age: 75
End: 2025-01-16

## 2025-01-16 DIAGNOSIS — E78.5 HYPERLIPIDEMIA, UNSPECIFIED HYPERLIPIDEMIA TYPE: ICD-10-CM

## 2025-01-16 DIAGNOSIS — I63.232 ACUTE CEREBROVASCULAR ACCIDENT (CVA) DUE TO OCCLUSION OF LEFT CAROTID ARTERY (HCC): Primary | ICD-10-CM

## 2025-01-16 DIAGNOSIS — I10 PRIMARY HYPERTENSION: ICD-10-CM

## 2025-01-16 DIAGNOSIS — R41.82 ACUTE ALTERATION IN MENTAL STATUS: ICD-10-CM

## (undated) DEVICE — SUTURE COAT VCRL SZ 4-0 L18IN ABSRB UD L19MM PS-2 1/2 CIR J496G

## (undated) DEVICE — LIQUIBAND RAPID ADHESIVE 36/CS 0.8ML: Brand: MEDLINE

## (undated) DEVICE — Device